# Patient Record
Sex: FEMALE | Race: WHITE | NOT HISPANIC OR LATINO | Employment: OTHER | URBAN - METROPOLITAN AREA
[De-identification: names, ages, dates, MRNs, and addresses within clinical notes are randomized per-mention and may not be internally consistent; named-entity substitution may affect disease eponyms.]

---

## 2017-03-31 ENCOUNTER — OFFICE VISIT (OUTPATIENT)
Dept: URGENT CARE | Facility: CLINIC | Age: 37
End: 2017-03-31
Payer: OTHER GOVERNMENT

## 2017-03-31 VITALS
BODY MASS INDEX: 28.84 KG/M2 | HEIGHT: 67 IN | WEIGHT: 183.75 LBS | SYSTOLIC BLOOD PRESSURE: 136 MMHG | HEART RATE: 90 BPM | DIASTOLIC BLOOD PRESSURE: 80 MMHG | TEMPERATURE: 98 F

## 2017-03-31 DIAGNOSIS — K64.5 THROMBOSED EXTERNAL HEMORRHOID: Primary | ICD-10-CM

## 2017-03-31 PROCEDURE — 99999 PR PBB SHADOW E&M-EST. PATIENT-LVL III: CPT | Mod: PBBFAC,,, | Performed by: PHYSICIAN ASSISTANT

## 2017-03-31 PROCEDURE — 99213 OFFICE O/P EST LOW 20 MIN: CPT | Mod: PBBFAC,PN | Performed by: PHYSICIAN ASSISTANT

## 2017-03-31 PROCEDURE — 99214 OFFICE O/P EST MOD 30 MIN: CPT | Mod: S$PBB,,, | Performed by: PHYSICIAN ASSISTANT

## 2017-03-31 RX ORDER — LIDOCAINE HYDROCHLORIDE AND HYDROCORTISONE ACETATE 30; 5 MG/G; MG/G
1 CREAM TOPICAL 2 TIMES DAILY
Qty: 1 TUBE | Refills: 0 | Status: SHIPPED | OUTPATIENT
Start: 2017-03-31 | End: 2017-04-07

## 2017-03-31 NOTE — PATIENT INSTRUCTIONS
Thrombosed Hemorrhoids    Hemorrhoids are swollen veins in the lower rectum and anus. They're similar to varicose veins that form in the legs. Hemorrhoids can happen inside the rectum (internal hemorrhoids). Or one may form at the anal opening (external hemorrhoids). Although they may bleed, most hemorrhoids aren't cause for concern. But a small blood clot (thrombus) can develop in an external hemorrhoid. This may lead to severe pain and sometimes bleeding.  When to go to the emergency room (ER)  If you have severe pain or excessive bleeding, seek immediate medical care.  What to expect in the ER  A healthcare provider is likely to check your anus and rectum using a slender, lighted tube (anoscope or proctoscope). A local anesthetic is given to ease any discomfort.  Treatment  Treatment recommendations include the following:  · If the blood clot has formed within the past 48 to 72 hours, your healthcare provider may remove it from within the hemorrhoid. This is a simple procedure that can relieve pain. You will have a local anesthetic to keep you pain-free during the procedure. A small incision is made in the skin, and the blood clot is removed. Stitches are generally not needed.  · If more than 72 hours have passed, your healthcare provider will suggest home treatments. Simple home treatments can relieve your pain. These may include warm baths, ointments, suppositories, and witch hazel compresses. Many thrombosed hemorrhoids go away on their own in a few weeks.  · If you have persistent bleeding or painful hemorrhoids, talk to your healthcare provider about possible treatment with banding, ligation, or removal (hemorrhoidectomy).  Tips for preventing hemorrhoids  Tips include the following:  · Eat foods that are high in fiber and use fiber supplements to help prevent constipation.  · Drink plenty of liquids.  · Get regular exercise to help prevent constipation and promote good bowel function.   Date Last  Reviewed: 6/1/2016 © 2000-2016 [employer]. [address] [place] [address], [place] [address]. All rights reserved. This information is not intended as a substitute for professional medical care. Always follow your healthcare professional's instructions.      Sitz baths as needed for pain relief  LidaMantle Cream twice a day for 1 week  Increase fiber and avoid hard stools  If no improvement in pain in 1 week please contact us for referral to Colorectal Surgery.

## 2017-03-31 NOTE — PROGRESS NOTES
"Subjective:       Patient ID: Brionna Carrion is a 36 y.o. female.    Chief Complaint: Rectal Bleeding    Rectal Bleeding   This is a new problem. The current episode started today (happened earlier today when starting to run for her work out). The problem occurs constantly. The problem has been unchanged. Associated symptoms include abdominal pain (feels the pain "everywhere"), congestion and fatigue. Pertinent negatives include no anorexia, change in bowel habit (no recent diarrhea or constipation, tends towards looser/mushy stools at baseline), chills, coughing, fever, nausea, rash, sore throat, urinary symptoms or vomiting. Associated symptoms comments: Has severe pain in the rectal area, feels like a "pulling" sensation describes as a string pulling from the anus. Nothing aggravates the symptoms. She has tried nothing for the symptoms.     Review of Systems   Constitutional: Positive for fatigue. Negative for chills, fever and unexpected weight change (no recent weight loss).   HENT: Positive for congestion. Negative for postnasal drip, rhinorrhea, sinus pressure, sneezing and sore throat.    Respiratory: Negative for cough, shortness of breath and wheezing.    Gastrointestinal: Positive for abdominal pain (feels the pain "everywhere"), anal bleeding, hematochezia and rectal pain. Negative for anorexia, blood in stool (no blood in the stool or melena, she wiped with a toilet tissue and had bright red blood on it, no further bleeding since then), change in bowel habit (no recent diarrhea or constipation, tends towards looser/mushy stools at baseline), constipation, diarrhea, nausea and vomiting.   Skin: Negative for rash.       Objective:      /80 (BP Location: Right arm, Patient Position: Sitting, BP Method: Manual)  Pulse 90  Temp 97.5 °F (36.4 °C) (Tympanic)   Ht 5' 7" (1.702 m)  Wt 83.3 kg (183 lb 12.1 oz)  LMP 03/16/2017 (Within Days)  BMI 28.78 kg/m2  Physical Exam   Constitutional: She is oriented " to person, place, and time. She appears well-developed and well-nourished. No distress.   HENT:   Head: Normocephalic and atraumatic.   Right Ear: External ear normal.   Left Ear: External ear normal.   Nose: Nose normal.   Eyes: Conjunctivae and EOM are normal. Right eye exhibits no discharge. Left eye exhibits no discharge.   Neck: Normal range of motion. Neck supple.   Cardiovascular: Normal rate, regular rhythm, normal heart sounds and intact distal pulses.  Exam reveals no gallop and no friction rub.    No murmur heard.  Pulmonary/Chest: Effort normal and breath sounds normal. No respiratory distress. She has no wheezes. She has no rales.   Abdominal: Soft. Bowel sounds are normal. She exhibits no distension. There is no tenderness. There is no rebound and no guarding.   Genitourinary: Rectal exam shows external hemorrhoid (small thrombosed external hemorrhoid to 4 o'clock, no fissure, hemorrhoid is tender to palpation, no bleeding from rectum, no visible clot in hemorrhoid but purplish hue and edema present) and tenderness. Rectal exam shows no fissure. Pelvic exam was performed with patient supine. There is no rash on the right labia. There is no rash on the left labia.   Lymphadenopathy:        Right: No inguinal adenopathy present.        Left: No inguinal adenopathy present.   Neurological: She is alert and oriented to person, place, and time.   Skin: Skin is warm and dry. No rash noted. She is not diaphoretic. No erythema.   Vitals reviewed.      Assessment:       1. Thrombosed external hemorrhoid        Plan:       Thrombosed external hemorrhoid  -     lidocaine HCl-hydrocortison ac (LIDAMANTLE-HC) 3-0.5 % topical cream; Apply 1 application topically 2 (two) times daily.  Dispense: 1 Tube; Refill: 0    Patient mentions she had hemorrhoids following vaginal delivery of child but has never had a bleeding or painful episode. Plan for conservative measures and if no improvement will refer to Surgery.    Chanda  baths as needed for pain relief  LidaMantle Cream twice a day for 1 week  Increase fiber and avoid hard stools  If no improvement in pain in 1 week please contact us for referral to Colorectal Surgery.    Heather Trant PA-C [facility]

## 2017-03-31 NOTE — MR AVS SNAPSHOT
[facility]  [address]  [place] [address]  Phone: [phone]                  Brionna Carrion   3/31/2017 8:50 AM   Office Visit    Description:  Female : 1980   Provider:  Jacki Hernadez PA-C   Department:  [facility]           Reason for Visit     Rectal Bleeding           Diagnoses this Visit        Comments    Thrombosed external hemorrhoid    -  Primary            To Do List           Goals (5 Years of Data)     None      Follow-Up and Disposition     Return if symptoms worsen or fail to improve.       These Medications        Disp Refills Start End    lidocaine HCl-hydrocortison ac (LIDAMANTLE-HC) 3-0.5 % topical cream 1 Tube 0 3/31/2017 2017    Apply 1 application topically 2 (two) times daily. - Topical (Top)    Pharmacy: [facility] [address] [place] [address] [place] [address] [address]  AT [facility] Ph #: [phone]         [facility]     [facility] Nurse Care Line -  Assistance  Unless otherwise directed by your provider, please contact [facility], our nurse care line that is available for  assistance.     Registered nurses in the [facility] Center provide: appointment scheduling, clinical advisement, health education, and other advisory services.  Call: [phone] (toll free)               Medications           Message regarding Medications     Verify the changes and/or additions to your medication regime listed below are the same as discussed with your clinician today.  If any of these changes or additions are incorrect, please notify your healthcare provider.        START taking these NEW medications        Refills    lidocaine HCl-hydrocortison ac (LIDAMANTLE-HC) 3-0.5 % topical cream 0    Sig: Apply 1 application topically 2 (two) times daily.    Class: Normal    Route: Topical (Top)           Verify that the below list of medications is an accurate representation of the medications you are currently  "taking.  If none reported, the list may be blank. If incorrect, please contact your healthcare provider. Carry this list with you in case of emergency.           Current Medications     cyclobenzaprine (FLEXERIL) 10 MG tablet Take 10 mg by mouth 3 (three) times daily as needed for Muscle spasms.    hydrocodone-acetaminophen 7.5-325mg (NORCO) 7.5-325 mg per tablet Take 1 tablet by mouth every 8 (eight) hours as needed for Pain.    lidocaine HCl-hydrocortison ac (LIDAMANTLE-HC) 3-0.5 % topical cream Apply 1 application topically 2 (two) times daily.    naproxen (NAPROSYN) 500 MG tablet Take 500 mg by mouth 2 (two) times daily.           Clinical Reference Information           Your Vitals Were     BP Pulse Temp Height    136/80 (BP Location: Right arm, Patient Position: Sitting, BP Method: Manual) 90 97.5 °F (36.4 °C) (Tympanic) 5' 7" (1.702 m)    Weight Last Period BMI    83.3 kg (183 lb 12.1 oz) 03/16/2017 (Within Days) 28.78 kg/m2      Blood Pressure          Most Recent Value    BP  136/80      Allergies as of 3/31/2017     No Known Allergies      Immunizations Administered on Date of Encounter - 3/31/2017     None      Instructions      Thrombosed Hemorrhoids    Hemorrhoids are swollen veins in the lower rectum and anus. They're similar to varicose veins that form in the legs. Hemorrhoids can happen inside the rectum (internal hemorrhoids). Or one may form at the anal opening (external hemorrhoids). Although they may bleed, most hemorrhoids aren't cause for concern. But a small blood clot (thrombus) can develop in an external hemorrhoid. This may lead to severe pain and sometimes bleeding.  When to go to the emergency room (ER)  If you have severe pain or excessive bleeding, seek immediate medical care.  What to expect in the ER  A healthcare provider is likely to check your anus and rectum using a slender, lighted tube (anoscope or proctoscope). A local anesthetic is given to ease any " discomfort.  Treatment  Treatment recommendations include the following:  · If the blood clot has formed within the past 48 to 72 hours, your healthcare provider may remove it from within the hemorrhoid. This is a simple procedure that can relieve pain. You will have a local anesthetic to keep you pain-free during the procedure. A small incision is made in the skin, and the blood clot is removed. Stitches are generally not needed.  · If more than 72 hours have passed, your healthcare provider will suggest home treatments. Simple home treatments can relieve your pain. These may include warm baths, ointments, suppositories, and witch hazel compresses. Many thrombosed hemorrhoids go away on their own in a few weeks.  · If you have persistent bleeding or painful hemorrhoids, talk to your healthcare provider about possible treatment with banding, ligation, or removal (hemorrhoidectomy).  Tips for preventing hemorrhoids  Tips include the following:  · Eat foods that are high in fiber and use fiber supplements to help prevent constipation.  · Drink plenty of liquids.  · Get regular exercise to help prevent constipation and promote good bowel function.   Date Last Reviewed: 6/1/2016  © 1657-9091 [employer]. [address] [place] [address]. All rights reserved. This information is not intended as a substitute for professional medical care. Always follow your healthcare professional's instructions.      Sitz baths as needed for pain relief  LidaMantle Cream twice a day for 1 week  Increase fiber and avoid hard stools  If no improvement in pain in 1 week please contact us for referral to Colorectal Surgery.       Language Assistance Services     ATTENTION: Language assistance services are available, free of charge. Please call [phone].      ATENCIÓN: Si habla glenañol, tiene a rodas disposición servicios gratuitos de asistencia lingüística. Llame al [phone].     GABRIELLA Ý: N?u b?n nói Ti?ng Vi?t,  có các d?ch v? h? tr? ngôn ng? mi?n phí dành cho b?n. G?i s? [phone].         [facility] complies with applicable Federal civil rights laws and does not discriminate on the basis of race, color, national origin, age, disability, or sex.

## 2017-07-03 ENCOUNTER — TELEPHONE (OUTPATIENT)
Dept: OBSTETRICS AND GYNECOLOGY | Facility: CLINIC | Age: 37
End: 2017-07-03

## 2017-08-01 ENCOUNTER — LAB VISIT (OUTPATIENT)
Dept: LAB | Facility: HOSPITAL | Age: 37
End: 2017-08-01
Attending: INTERNAL MEDICINE
Payer: OTHER GOVERNMENT

## 2017-08-01 ENCOUNTER — OFFICE VISIT (OUTPATIENT)
Dept: INTERNAL MEDICINE | Facility: CLINIC | Age: 37
End: 2017-08-01
Payer: OTHER GOVERNMENT

## 2017-08-01 ENCOUNTER — APPOINTMENT (OUTPATIENT)
Dept: RADIOLOGY | Facility: HOSPITAL | Age: 37
End: 2017-08-01
Attending: INTERNAL MEDICINE
Payer: OTHER GOVERNMENT

## 2017-08-01 VITALS
RESPIRATION RATE: 16 BRPM | DIASTOLIC BLOOD PRESSURE: 57 MMHG | HEART RATE: 79 BPM | SYSTOLIC BLOOD PRESSURE: 124 MMHG | BODY MASS INDEX: 28.86 KG/M2 | WEIGHT: 183.88 LBS | HEIGHT: 67 IN | OXYGEN SATURATION: 98 % | TEMPERATURE: 99 F

## 2017-08-01 DIAGNOSIS — E55.9 VITAMIN D DEFICIENCY: ICD-10-CM

## 2017-08-01 DIAGNOSIS — M79.675 TOE PAIN, LEFT: ICD-10-CM

## 2017-08-01 DIAGNOSIS — N92.1 MENOMETRORRHAGIA: ICD-10-CM

## 2017-08-01 DIAGNOSIS — M79.675 TOE PAIN, LEFT: Primary | ICD-10-CM

## 2017-08-01 DIAGNOSIS — R53.83 FATIGUE, UNSPECIFIED TYPE: ICD-10-CM

## 2017-08-01 DIAGNOSIS — S92.512A CLOSED DISPLACED FRACTURE OF PROXIMAL PHALANX OF LESSER TOE OF LEFT FOOT, INITIAL ENCOUNTER: Primary | ICD-10-CM

## 2017-08-01 LAB
25(OH)D3+25(OH)D2 SERPL-MCNC: 47 NG/ML
ALBUMIN SERPL BCP-MCNC: 4.1 G/DL
ALP SERPL-CCNC: 60 U/L
ALT SERPL W/O P-5'-P-CCNC: 20 U/L
ANION GAP SERPL CALC-SCNC: 9 MMOL/L
AST SERPL-CCNC: 24 U/L
BASOPHILS # BLD AUTO: 0.04 K/UL
BASOPHILS NFR BLD: 0.8 %
BILIRUB SERPL-MCNC: 0.4 MG/DL
BUN SERPL-MCNC: 10 MG/DL
CALCIUM SERPL-MCNC: 10.1 MG/DL
CHLORIDE SERPL-SCNC: 105 MMOL/L
CHOLEST/HDLC SERPL: 2.8 {RATIO}
CO2 SERPL-SCNC: 25 MMOL/L
CREAT SERPL-MCNC: 0.8 MG/DL
DIFFERENTIAL METHOD: ABNORMAL
EOSINOPHIL # BLD AUTO: 0.1 K/UL
EOSINOPHIL NFR BLD: 1.3 %
ERYTHROCYTE [DISTWIDTH] IN BLOOD BY AUTOMATED COUNT: 13.4 %
EST. GFR  (AFRICAN AMERICAN): >60 ML/MIN/1.73 M^2
EST. GFR  (NON AFRICAN AMERICAN): >60 ML/MIN/1.73 M^2
FERRITIN SERPL-MCNC: 69 NG/ML
GLUCOSE SERPL-MCNC: 85 MG/DL
HCT VFR BLD AUTO: 37.1 %
HDL/CHOLESTEROL RATIO: 35.6 %
HDLC SERPL-MCNC: 188 MG/DL
HDLC SERPL-MCNC: 67 MG/DL
HGB BLD-MCNC: 12.6 G/DL
IRON SERPL-MCNC: 84 UG/DL
LDLC SERPL CALC-MCNC: 110.8 MG/DL
LYMPHOCYTES # BLD AUTO: 2.1 K/UL
LYMPHOCYTES NFR BLD: 39.5 %
MCH RBC QN AUTO: 31.7 PG
MCHC RBC AUTO-ENTMCNC: 34 G/DL
MCV RBC AUTO: 93 FL
MONOCYTES # BLD AUTO: 0.4 K/UL
MONOCYTES NFR BLD: 7.1 %
NEUTROPHILS # BLD AUTO: 2.7 K/UL
NEUTROPHILS NFR BLD: 51.3 %
NONHDLC SERPL-MCNC: 121 MG/DL
PLATELET # BLD AUTO: 255 K/UL
PMV BLD AUTO: 10.8 FL
POTASSIUM SERPL-SCNC: 5 MMOL/L
PROT SERPL-MCNC: 7.8 G/DL
RBC # BLD AUTO: 3.98 M/UL
SATURATED IRON: 22 %
SODIUM SERPL-SCNC: 139 MMOL/L
T4 FREE SERPL-MCNC: 1.16 NG/DL
TOTAL IRON BINDING CAPACITY: 379 UG/DL
TRANSFERRIN SERPL-MCNC: 256 MG/DL
TRIGL SERPL-MCNC: 51 MG/DL
TSH SERPL DL<=0.005 MIU/L-ACNC: 2.26 UIU/ML
WBC # BLD AUTO: 5.24 K/UL

## 2017-08-01 PROCEDURE — 85025 COMPLETE CBC W/AUTO DIFF WBC: CPT

## 2017-08-01 PROCEDURE — 99214 OFFICE O/P EST MOD 30 MIN: CPT | Mod: S$PBB,,, | Performed by: INTERNAL MEDICINE

## 2017-08-01 PROCEDURE — 82728 ASSAY OF FERRITIN: CPT

## 2017-08-01 PROCEDURE — 80053 COMPREHEN METABOLIC PANEL: CPT

## 2017-08-01 PROCEDURE — 82306 VITAMIN D 25 HYDROXY: CPT

## 2017-08-01 PROCEDURE — 99999 PR PBB SHADOW E&M-EST. PATIENT-LVL IV: CPT | Mod: PBBFAC,,, | Performed by: INTERNAL MEDICINE

## 2017-08-01 PROCEDURE — 99214 OFFICE O/P EST MOD 30 MIN: CPT | Mod: PBBFAC,PN,25 | Performed by: INTERNAL MEDICINE

## 2017-08-01 PROCEDURE — 84443 ASSAY THYROID STIM HORMONE: CPT

## 2017-08-01 PROCEDURE — 73630 X-RAY EXAM OF FOOT: CPT | Mod: 26,LT,, | Performed by: RADIOLOGY

## 2017-08-01 PROCEDURE — 73630 X-RAY EXAM OF FOOT: CPT | Mod: TC,PO,LT

## 2017-08-01 PROCEDURE — 80061 LIPID PANEL: CPT

## 2017-08-01 PROCEDURE — 84439 ASSAY OF FREE THYROXINE: CPT

## 2017-08-01 PROCEDURE — 83540 ASSAY OF IRON: CPT

## 2017-08-01 NOTE — PROGRESS NOTES
Subjective:      Patient ID: Brionna Carrion is a 36 y.o. female.    Chief Complaint: Toe Injury      HPI  Ms. Carrion is a patient of mine, who presents for left pinky toe pain, which started 1.5 weeks ago while wrestling with her son. She has noted pain with running or pressure in the area and has not taken anything for her pain. No pain at night in bed, but pain with shoes.    She also reports having irregular menses over the past 3 months, particularly this past month, which she describes as 3 heavy menses, which caused her usual degree of cramping, irregularly duration, which she took Midol (acetaminophen/caffeine/pyrilamine). She reports fatigue over the past month, which she suspects may be due to anemia.    Past Medical History:   Diagnosis Date    Abnormal Pap smear     HPV/ biopsy-- never had a normal pap    Allergy     seasonal    History of HPV infection 01/01/2002    Initially had abnormal PAPs, except for 2015.     Past Surgical History:   Procedure Laterality Date    APPENDECTOMY      BREAST RECONSTRUCTION      augmentation    TONSILLECTOMY       Social History     Social History    Marital status:      Spouse name: N/A    Number of children: N/A    Years of education: N/A     Occupational History    [employer]     Social History Main Topics    Smoking status: Never Smoker    Smokeless tobacco: Not on file    Alcohol use Yes      Comment: 2 drinks every 2 weeks    Drug use: No    Sexual activity: Yes     Partners: Male      Comment:  visectomy     Other Topics Concern    Not on file     Social History Narrative    Breakfast: Skips, except water or Diet Coke    Lunch: [employer]     Family History   Problem Relation Age of Onset    Hypertension Mother     Breast cancer Maternal Grandmother     Hypertension Maternal Grandmother     Glaucoma Maternal Grandmother     Cataracts Maternal Grandmother     Breast cancer Maternal Aunt     Breast cancer Maternal  "Aunt     Breast cancer Maternal Aunt     Breast cancer Maternal Aunt     Ovarian cancer Neg Hx     Stroke Neg Hx        Current Outpatient Prescriptions:     cyclobenzaprine (FLEXERIL) 10 MG tablet, Take 10 mg by mouth 3 (three) times daily as needed for Muscle spasms., Disp: , Rfl:     hydrocodone-acetaminophen 7.5-325mg (NORCO) 7.5-325 mg per tablet, Take 1 tablet by mouth every 8 (eight) hours as needed for Pain., Disp: 45 tablet, Rfl: 0    naproxen (NAPROSYN) 500 MG tablet, Take 500 mg by mouth 2 (two) times daily., Disp: , Rfl:     Review of patient's allergies indicates:  No Known Allergies    Review of Systems   Negative.     Objective:     BP (!) 124/57 (BP Location: Left arm, Patient Position: Sitting, BP Method: Manual)   Pulse 79   Temp 98.7 °F (37.1 °C) (Tympanic)   Resp 16   Ht 5' 7" (1.702 m)   Wt 83.4 kg (183 lb 13.8 oz)   SpO2 98%   BMI 28.80 kg/m²     Physical Exam  GEN: A&O fully, NAD  PSYC: Normal affect    Assessment:      1. Toe pain, left: Likely fx. Will check x-ray today.    2. Menometrorrhagia: DDx includes fibroids, endometriosis. Will refer to Dr. Corinna Shepard.   3.        Plan:   Toe pain, left  -     X-Ray Foot Complete Left; Future; Expected date: 08/01/2017    Menometrorrhagia  -     Ambulatory consult to Obstetrics / Gynecology  -     CBC auto differential; Future; Expected date: 08/01/2017    Fatigue, unspecified type  -     CBC auto differential; Future; Expected date: 08/01/2017  -     Comprehensive metabolic panel; Future; Expected date: 08/01/2017  -     T4, free; Future; Expected date: 08/01/2017  -     Lipid panel; Future; Expected date: 08/01/2017  -     TSH; Future; Expected date: 08/01/2017    Vitamin D deficiency  -     Vitamin D; Future; Expected date: 08/01/2017        RTC in 4 weeks RE fatigue or sooner as needed  "

## 2017-08-02 DIAGNOSIS — E55.9 VITAMIN D DEFICIENCY: Primary | ICD-10-CM

## 2017-08-04 ENCOUNTER — PATIENT MESSAGE (OUTPATIENT)
Dept: INTERNAL MEDICINE | Facility: CLINIC | Age: 37
End: 2017-08-04

## 2017-08-07 DIAGNOSIS — S92.512A CLOSED DISPLACED FRACTURE OF PROXIMAL PHALANX OF LESSER TOE OF LEFT FOOT, INITIAL ENCOUNTER: Primary | ICD-10-CM

## 2017-08-07 RX ORDER — MELOXICAM 7.5 MG/1
7.5 TABLET ORAL DAILY
Qty: 14 TABLET | Refills: 0 | Status: SHIPPED | OUTPATIENT
Start: 2017-08-07 | End: 2017-08-21

## 2017-08-10 ENCOUNTER — OFFICE VISIT (OUTPATIENT)
Dept: OBSTETRICS AND GYNECOLOGY | Facility: CLINIC | Age: 37
End: 2017-08-10
Payer: OTHER GOVERNMENT

## 2017-08-10 VITALS
WEIGHT: 179.44 LBS | DIASTOLIC BLOOD PRESSURE: 64 MMHG | SYSTOLIC BLOOD PRESSURE: 105 MMHG | HEIGHT: 67 IN | BODY MASS INDEX: 28.16 KG/M2

## 2017-08-10 DIAGNOSIS — B97.7 HPV IN FEMALE: Primary | ICD-10-CM

## 2017-08-10 PROCEDURE — 99395 PREV VISIT EST AGE 18-39: CPT | Mod: S$PBB,,, | Performed by: OBSTETRICS & GYNECOLOGY

## 2017-08-10 PROCEDURE — 99213 OFFICE O/P EST LOW 20 MIN: CPT | Mod: PBBFAC,PN | Performed by: OBSTETRICS & GYNECOLOGY

## 2017-08-10 PROCEDURE — 88142 CYTOPATH C/V THIN LAYER: CPT

## 2017-08-10 PROCEDURE — 99999 PR PBB SHADOW E&M-EST. PATIENT-LVL III: CPT | Mod: PBBFAC,,, | Performed by: OBSTETRICS & GYNECOLOGY

## 2017-08-10 NOTE — PROGRESS NOTES
"Subjective:       Brionna Carrion is a 36 y.o. female here for a annual exam.  Current complaints: Last 3 months periods have been irregular and heavier. Did have LMP on 2017 but had some intermenstrual bleeding w/o pain.  Personal health questionnaire reviewed: yes.    Patient states has had abnormal paps due to "HPV"    Mutually monogamous relationship. No pelvic pain, vaginal discharge or postcoital spotting.   has vasectomy.  No contraception used any longer (Had Mirena but removed a few years back as no need for contraception).   Patient had CBC recently due to this and was normal.  Denies weakness or snycope.      Gynecologic History  Patient's last menstrual period was 2017.  Contraception: vasectomy  Last Pap: . Results were: normal  Last mammogram: NA. Results were: NA    Obstetric History  OB History    Para Term  AB Living   3 2 2   1 2   SAB TAB Ectopic Multiple Live Births     1     2      # Outcome Date GA Lbr Greg/2nd Weight Sex Delivery Anes PTL Lv   3 TAB            2 Term      Vag-Spont   [place]   1 Term      Vag-Spont   [place]            The following portions of the patient's history were reviewed and updated as appropriate: allergies, current medications, past family history, past medical history, past social history, past surgical history and problem list.    Review of Systems  Pertinent items are noted in HPI.      Objective:        /64   Ht 5' 7" (1.702 m)   Wt 81.4 kg (179 lb 7.3 oz)   LMP 2017   BMI 28.11 kg/m²     General Appearance:    Alert, cooperative, no distress, appears stated age   Head:    Normocephalic, without obvious abnormality, atraumatic   Eyes:    PERRL, conjunctiva/corneas clear, EOM's intact,  both eyes   Ears:    Normal external ear canals, both ears   Nose:   Nares normal, septum midline, mucosa normal, no drainage    or sinus tenderness   Throat:   Lips, mucosa, and tongue normal; teeth and gums normal   Neck:   Supple, " symmetrical, trachea midline, no adenopathy;     thyroid:  no enlargement/tenderness/nodules; no carotid    bruit or JVD   Back:     Symmetric, no curvature, ROM normal, no CVA tenderness   Lungs:     Clear to auscultation bilaterally, respirations unlabored   Chest Wall:    No tenderness or deformity    Heart:    Regular rate and rhythm, S1 and S2 normal, no murmur, rub   or gallop   Breast Exam:    No tenderness, masses, or nipple abnormality   Abdomen:     Soft, non-tender, bowel sounds active all four quadrants,     no masses, no organomegaly   Genitalia:    Normal female without lesion, discharge or tenderness. Cervix midline, parous and w/o friability and neg for CMT. Uterus firm, mobile, midline, non-tender. No adnexal masses or tenderness noted with exam. Pap done.    Rectal:    Deferred.    Extremities:   Extremities normal, atraumatic, no cyanosis or edema   Pulses:   2+ and symmetric all extremities   Skin:   Skin color, texture, turgor normal, no rashes or lesions   Lymph nodes:   Cervical, supraclavicular, and axillary nodes normal   Neurologic:   normal strength, sensation and reflexes     throughout           Assessment:      Healthy female exam.    H/o intermenstrual bleeding.   Pap due to h/o HPV      Plan:       1. Reviewed current [place] guidelines for Q 3 year cervical cancer screening. Patient will be due next year with annual visit.  2. Watchful waiting for menstrual cycle as most likely will self correct without hormonal intervention when patient presented with options to control/regular cycles with a lo dose estrogen pill.  Understands that menstrual cycles can become unregular and in the absence of infection/disease or pathology is non-emergent and self-limiting often times.  3. Continue annual exam.  To come in sooner if problems occur.

## 2017-08-10 NOTE — LETTER
August 10, 2017      Johnny Mantilla MD  [address]  [address] [facility] [address]           [facility]  [address] [place] [address]  Phone: [phone]          Patient: Brionna Carrion   MR Number: [id number]   YOB: 1980   Date of Visit: 8/10/2017       Dear Dr. Johnny Mantilla:    Thank you for referring Brionna Carrion to me for evaluation. Attached you will find relevant portions of my assessment and plan of care.    If you have questions, please do not hesitate to call me. I look forward to following Brionna Carrion along with you.    Sincerely,    Roselyn Golden CNM    Enclosure  CC:  No Recipients    If you would like to receive this communication electronically, please contact [email or web address] or [phone] to request more information on [employer] Link access.    For providers and/or their staff who would like to refer a patient to [facility], please contact us through our one-stop-shop provider referral line, [facility], at [phone].    If you feel you have received this communication in error or would no longer like to receive these types of communications, please e-mail [email or web address]

## 2017-08-16 ENCOUNTER — OFFICE VISIT (OUTPATIENT)
Dept: INTERNAL MEDICINE | Facility: CLINIC | Age: 37
End: 2017-08-16
Payer: OTHER GOVERNMENT

## 2017-08-16 VITALS
WEIGHT: 181.19 LBS | DIASTOLIC BLOOD PRESSURE: 63 MMHG | OXYGEN SATURATION: 98 % | HEIGHT: 67 IN | BODY MASS INDEX: 28.44 KG/M2 | SYSTOLIC BLOOD PRESSURE: 118 MMHG | TEMPERATURE: 98 F | HEART RATE: 93 BPM

## 2017-08-16 DIAGNOSIS — M62.830 BACK MUSCLE SPASM: Primary | ICD-10-CM

## 2017-08-16 PROCEDURE — 99213 OFFICE O/P EST LOW 20 MIN: CPT | Mod: S$PBB,,, | Performed by: INTERNAL MEDICINE

## 2017-08-16 PROCEDURE — 3008F BODY MASS INDEX DOCD: CPT | Mod: ,,, | Performed by: INTERNAL MEDICINE

## 2017-08-16 PROCEDURE — 99213 OFFICE O/P EST LOW 20 MIN: CPT | Mod: PBBFAC,PN | Performed by: INTERNAL MEDICINE

## 2017-08-16 PROCEDURE — 99999 PR PBB SHADOW E&M-EST. PATIENT-LVL III: CPT | Mod: PBBFAC,,, | Performed by: INTERNAL MEDICINE

## 2017-08-16 RX ORDER — HYDROCODONE BITARTRATE AND ACETAMINOPHEN 5; 325 MG/1; MG/1
1 TABLET ORAL NIGHTLY PRN
Qty: 5 TABLET | Refills: 0 | Status: SHIPPED | OUTPATIENT
Start: 2017-08-16 | End: 2018-05-23

## 2017-08-16 RX ORDER — TIZANIDINE HYDROCHLORIDE 6 MG/1
6 CAPSULE, GELATIN COATED ORAL NIGHTLY PRN
Qty: 5 CAPSULE | Refills: 0 | Status: SHIPPED | OUTPATIENT
Start: 2017-08-16 | End: 2017-08-21

## 2017-08-16 NOTE — PROGRESS NOTES
Subjective:      Patient ID: Brionna Carrion is a 36 y.o. female.    Chief Complaint: Back Pain      HPI  Ms. Carrion is a patient of mine, who presents for back pain, which started yesterday afternoon following exercise, which worsened last night. She took Flexeril from a previous prescription (>=1 yr old), which didn't help. She has had back spasms since, which have been debilitating. No loss of bladder or bowel function. No numbness or tingling.     Past Medical History:   Diagnosis Date    Abnormal Pap smear     HPV/ biopsy-- never had a normal pap    Allergy     seasonal    History of HPV infection 01/01/2002    Initially had abnormal PAPs, except for 2015.     Past Surgical History:   Procedure Laterality Date    APPENDECTOMY      BREAST RECONSTRUCTION      augmentation    TONSILLECTOMY       Social History     Social History    Marital status:      Spouse name: N/A    Number of children: N/A    Years of education: N/A     Occupational History    [employer]     Social History Main Topics    Smoking status: Never Smoker    Smokeless tobacco: Not on file    Alcohol use Yes      Comment: 2 drinks every 2 weeks    Drug use: No    Sexual activity: Yes     Partners: Male      Comment:  visectomy     Other Topics Concern    Not on file     Social History Narrative    Breakfast: Skips, except water or Diet Coke    Lunch: [employer]     Family History   Problem Relation Age of Onset    Hypertension Mother     Breast cancer Maternal Grandmother     Hypertension Maternal Grandmother     Glaucoma Maternal Grandmother     Cataracts Maternal Grandmother     Breast cancer Maternal Aunt     Breast cancer Maternal Aunt     Breast cancer Maternal Aunt     Breast cancer Maternal Aunt     Ovarian cancer Neg Hx     Stroke Neg Hx        Current Outpatient Prescriptions:     meloxicam (MOBIC) 7.5 MG tablet, Take 1 tablet (7.5 mg total) by mouth once daily., Disp: 14 tablet,  "Rfl: 0    Review of patient's allergies indicates:  No Known Allergies    Review of Systems   Negative.     Objective:     /63 (BP Location: Left arm, Patient Position: Sitting, BP Method: Large (Automatic))   Pulse 93   Temp 97.6 °F (36.4 °C) (Tympanic)   Ht 5' 7" (1.702 m)   Wt 82.2 kg (181 lb 3.5 oz)   LMP 07/24/2017   SpO2 98%   BMI 28.38 kg/m²     Physical Exam  GEN: A&O fully, NAD  PSYC: Normal affect  MSK: full ROM; paraspinal spasms    Lab Results   Component Value Date    WBC 5.24 08/01/2017    HGB 12.6 08/01/2017    HCT 37.1 08/01/2017     08/01/2017    CHOL 188 08/01/2017    TRIG 51 08/01/2017    HDL 67 08/01/2017    ALT 20 08/01/2017    AST 24 08/01/2017     08/01/2017    K 5.0 08/01/2017     08/01/2017    CREATININE 0.8 08/01/2017    BUN 10 08/01/2017    CO2 25 08/01/2017    TSH 2.263 08/01/2017       Assessment:      No diagnosis found.  Plan:   There are no diagnoses linked to this encounter.    RTC in xxx RE xxx or sooner as needed  "

## 2018-04-07 ENCOUNTER — PATIENT MESSAGE (OUTPATIENT)
Dept: INTERNAL MEDICINE | Facility: CLINIC | Age: 38
End: 2018-04-07

## 2018-04-10 DIAGNOSIS — T78.40XD ALLERGIC STATE, SUBSEQUENT ENCOUNTER: ICD-10-CM

## 2018-04-10 RX ORDER — LORATADINE 10 MG/1
10 TABLET ORAL DAILY
Refills: 0 | COMMUNITY
Start: 2018-04-10 | End: 2018-05-23

## 2018-05-23 ENCOUNTER — OFFICE VISIT (OUTPATIENT)
Dept: INTERNAL MEDICINE | Facility: CLINIC | Age: 38
End: 2018-05-23
Payer: OTHER GOVERNMENT

## 2018-05-23 VITALS
BODY MASS INDEX: 28.31 KG/M2 | OXYGEN SATURATION: 99 % | DIASTOLIC BLOOD PRESSURE: 68 MMHG | WEIGHT: 180.75 LBS | HEART RATE: 89 BPM | TEMPERATURE: 98 F | SYSTOLIC BLOOD PRESSURE: 116 MMHG

## 2018-05-23 DIAGNOSIS — Z80.3 FAMILY HISTORY OF BREAST CANCER IN FEMALE: ICD-10-CM

## 2018-05-23 DIAGNOSIS — Z00.00 ROUTINE GENERAL MEDICAL EXAMINATION AT A HEALTH CARE FACILITY: Primary | ICD-10-CM

## 2018-05-23 DIAGNOSIS — R68.89 RECENT CHANGE IN WEIGHT: ICD-10-CM

## 2018-05-23 PROCEDURE — 99214 OFFICE O/P EST MOD 30 MIN: CPT | Mod: S$PBB,,, | Performed by: INTERNAL MEDICINE

## 2018-05-23 PROCEDURE — 99213 OFFICE O/P EST LOW 20 MIN: CPT | Mod: PBBFAC,PN | Performed by: INTERNAL MEDICINE

## 2018-05-23 PROCEDURE — 99999 PR PBB SHADOW E&M-EST. PATIENT-LVL III: CPT | Mod: PBBFAC,,, | Performed by: INTERNAL MEDICINE

## 2018-05-23 NOTE — PROGRESS NOTES
Subjective:      Patient ID: Brionna Carrion is a 37 y.o. female.    Chief Complaint: Weight Gain      HPI     Ms. Brionna Carrion is a patient of Johnny Mantilla MD, who presents for activity change and unexpected weight change.    She had reported wanting to decrease her weight by 30-40 lbs in 10/2016, at which time her weight was 179.5 lbs. Today her weight is 180.78 lbs. She today reports not losing any weight over the past 2 weeks, at which time she started an aggressive exercise program and 1 week ago started an appetite suppresser called Performix 2 caps daily, which contains caffeine 300 mg, theacrine 50 mg, p-Synephrine 25 mg, Evodiamine 5 mg, Yohimbine 5 mg, black pepper extract, oils (medium chain TG, safflower extract, CLA, D-limonene, azeem, gingerols, zingerones, shogaols, rosemary, camosic acid, camosol, ursolic acid and oleanolic acid) 850 mg, B12 1000 mcg and 1 gm total fat, 10 juan.    Of note, EPIC indicates she is due for TDAP.       Past Medical History:   Diagnosis Date    Abnormal Pap smear     HPV/ biopsy-- never had a normal pap    Allergy     year around; tried OTC Zyrtec, Allegra, which makes her sleepy; increased water has helped partially    Family history of breast cancer in female     Early breast cancer on mother's side of the family    History of HPV infection 01/01/2002    Initially had abnormal PAPs, except for 2015.     Past Surgical History:   Procedure Laterality Date    APPENDECTOMY      BREAST RECONSTRUCTION      augmentation    TONSILLECTOMY       Social History     Social History    Marital status:      Spouse name: N/A    Number of children: N/A    Years of education: N/A     Occupational History    [employer]        [employer]     Social History Main Topics    Smoking status: Never Smoker    Smokeless tobacco: Not on file    Alcohol use Yes      Comment: 2 drinks every 2 weeks    Drug use: No    Sexual activity: Yes     Partners: Male      Comment:   "visectomy     Other Topics Concern    Not on file     Social History Narrative    Breakfast: Protein shake (grass fed & powder CLA), water, +5 hour energy drink throughout the day    Lunch: Meat: shrimp & 1/2 sweet potato; water     Dinner: Salad with black olives, olive oil, little salt; water    Snacks: None (no sodas or diet sodas)    Eats out: 1x/mo: [employer]: grilled shrimp, sweet potato, part of a salad; water    PA: 45 min resistance 3x/wk, runs 5 mi 3-4 x/wk, yoga in the sauna "[employer]"    Water: 2 - 24 oz/d + 8 = 56 oz/d         Family History   Problem Relation Age of Onset    Hypertension Mother     Breast cancer Maternal Grandmother     Hypertension Maternal Grandmother     Glaucoma Maternal Grandmother     Cataracts Maternal Grandmother     Breast cancer Maternal Aunt     Breast cancer Maternal Aunt     Breast cancer Maternal Aunt     Breast cancer Maternal Aunt     Ovarian cancer Neg Hx     Stroke Neg Hx      No current outpatient prescriptions on file.    Review of patient's allergies indicates:  No Known Allergies     Review of Systems   Constitutional: Positive for activity change and unexpected weight change.   HENT: Negative for hearing loss, rhinorrhea and trouble swallowing.    Eyes: Negative for discharge and visual disturbance.   Respiratory: Negative for chest tightness and wheezing.    Cardiovascular: Negative for chest pain and palpitations.   Gastrointestinal: Negative for blood in stool, constipation, diarrhea and vomiting.   Endocrine: Negative for polydipsia and polyuria.   Genitourinary: Negative for difficulty urinating, dysuria, hematuria and menstrual problem.   Musculoskeletal: Negative for arthralgias, joint swelling and neck pain.   Neurological: Negative for weakness and headaches.   Psychiatric/Behavioral: Negative for confusion and dysphoric mood.       Objective:     /68 (BP Location: Right arm, Patient Position: Sitting, BP Method: Large (Manual))   " Pulse 89   Temp 97.8 °F (36.6 °C) (Tympanic)   Wt 82 kg (180 lb 12.4 oz)   SpO2 99%   BMI 28.31 kg/m²     Physical Exam  GEN: A&O fully, NAD  PSYC: Normal affect      Lab Results   Component Value Date    WBC 5.24 08/01/2017    HGB 12.6 08/01/2017    HCT 37.1 08/01/2017     08/01/2017    CHOL 188 08/01/2017    TRIG 51 08/01/2017    HDL 67 08/01/2017    LDLCALC 110.8 08/01/2017    ALT 20 08/01/2017    AST 24 08/01/2017     08/01/2017    K 5.0 08/01/2017     08/01/2017    CREATININE 0.8 08/01/2017    BUN 10 08/01/2017    CO2 25 08/01/2017    TSH 2.263 08/01/2017       Assessment:      1. Recent change in weight: Would like to lose weight, but hasn't been successful. Discussed strategies for lifestyle modifications, including systematically increasing physical activity, water; and avoiding potatoes, sugar sweetened beverages, red/processed meats/chicken, grains, tomatoes, colorful peppers; and increasing yogurt, whole fruits, unsalted nuts, non-starchy vegetables, cooked beans, and weight logging. Recommended she avoid supplements except vitamin D 1,000 IU/day, omega 3 fatty acids 1-2 gm/day if not having salmon/tuna and turmeric 500 mg 2 capsules/day.   2. Family history of breast cancer in female: Encouraged her to have breast exams q6m with her OBGYN or annually staggered between OB & PCP.        Plan:   Routine general medical examination at a health care facility  -     CBC auto differential; Future; Expected date: 05/23/2018  -     Comprehensive metabolic panel; Future; Expected date: 05/23/2018  -     Lipid panel; Future; Expected date: 05/23/2018  -     T4, free; Future; Expected date: 05/23/2018  -     TSH; Future; Expected date: 05/23/2018  -     Vitamin D; Future; Expected date: 05/23/2018    Family history of breast cancer in female    Recent change in weight        Follow-up in about 2 weeks (around 6/6/2018), or if symptoms worsen or fail to improve, for FU on overweight.

## 2018-07-12 ENCOUNTER — OFFICE VISIT (OUTPATIENT)
Dept: URGENT CARE | Facility: CLINIC | Age: 38
End: 2018-07-12
Payer: OTHER GOVERNMENT

## 2018-07-12 ENCOUNTER — HOSPITAL ENCOUNTER (OUTPATIENT)
Dept: RADIOLOGY | Facility: HOSPITAL | Age: 38
Discharge: HOME | End: 2018-07-12
Attending: FAMILY MEDICINE
Payer: OTHER GOVERNMENT

## 2018-07-12 ENCOUNTER — OFFICE VISIT (OUTPATIENT)
Dept: ORTHOPEDICS | Facility: CLINIC | Age: 38
End: 2018-07-12
Payer: OTHER GOVERNMENT

## 2018-07-12 VITALS
HEART RATE: 64 BPM | WEIGHT: 187.63 LBS | OXYGEN SATURATION: 99 % | DIASTOLIC BLOOD PRESSURE: 78 MMHG | SYSTOLIC BLOOD PRESSURE: 112 MMHG | HEIGHT: 67 IN | BODY MASS INDEX: 29.45 KG/M2 | TEMPERATURE: 98 F

## 2018-07-12 VITALS
BODY MASS INDEX: 29.45 KG/M2 | HEIGHT: 67 IN | HEART RATE: 64 BPM | RESPIRATION RATE: 12 BRPM | WEIGHT: 187.63 LBS | SYSTOLIC BLOOD PRESSURE: 112 MMHG | DIASTOLIC BLOOD PRESSURE: 78 MMHG

## 2018-07-12 DIAGNOSIS — S92.514A NONDISPLACED FRACTURE OF PROXIMAL PHALANX OF RIGHT LESSER TOE(S), INITIAL ENCOUNTER FOR CLOSED FRACTURE: Primary | ICD-10-CM

## 2018-07-12 DIAGNOSIS — S99.921A INJURY OF RIGHT FOOT, INITIAL ENCOUNTER: ICD-10-CM

## 2018-07-12 DIAGNOSIS — S92.514A CLOSED NONDISPLACED FRACTURE OF PROXIMAL PHALANX OF LESSER TOE OF RIGHT FOOT, INITIAL ENCOUNTER: ICD-10-CM

## 2018-07-12 DIAGNOSIS — S99.921A INJURY OF RIGHT FOOT, INITIAL ENCOUNTER: Primary | ICD-10-CM

## 2018-07-12 DIAGNOSIS — M79.674 TOE PAIN, RIGHT: Primary | ICD-10-CM

## 2018-07-12 PROCEDURE — 73660 X-RAY EXAM OF TOE(S): CPT | Mod: TC,FY,PO,RT

## 2018-07-12 PROCEDURE — 73660 X-RAY EXAM OF TOE(S): CPT | Mod: 26,RT,, | Performed by: RADIOLOGY

## 2018-07-12 PROCEDURE — 99213 OFFICE O/P EST LOW 20 MIN: CPT | Mod: S$PBB,,, | Performed by: PHYSICIAN ASSISTANT

## 2018-07-12 PROCEDURE — 99999 PR PBB SHADOW E&M-EST. PATIENT-LVL IV: CPT | Mod: PBBFAC,,, | Performed by: FAMILY MEDICINE

## 2018-07-12 PROCEDURE — 99214 OFFICE O/P EST MOD 30 MIN: CPT | Mod: S$PBB,,, | Performed by: FAMILY MEDICINE

## 2018-07-12 PROCEDURE — 99213 OFFICE O/P EST LOW 20 MIN: CPT | Mod: PBBFAC,25,27,PO | Performed by: PHYSICIAN ASSISTANT

## 2018-07-12 PROCEDURE — 99214 OFFICE O/P EST MOD 30 MIN: CPT | Mod: PBBFAC,25,PO | Performed by: FAMILY MEDICINE

## 2018-07-12 PROCEDURE — 99999 PR PBB SHADOW E&M-EST. PATIENT-LVL III: CPT | Mod: PBBFAC,,, | Performed by: PHYSICIAN ASSISTANT

## 2018-07-12 RX ORDER — TRAMADOL HYDROCHLORIDE 50 MG/1
50 TABLET ORAL EVERY 8 HOURS PRN
Qty: 20 TABLET | Refills: 0 | Status: SHIPPED | OUTPATIENT
Start: 2018-07-12 | End: 2018-07-23

## 2018-07-12 RX ORDER — LORATADINE 10 MG/1
10 TABLET ORAL DAILY
COMMUNITY
End: 2020-09-22

## 2018-07-12 NOTE — LETTER
July 13, 2018      Neelima Draper MD  [address] [facility] [place] [address]           [facility]  [address] [place] [address]  Phone: [phone]  Fax: [phone]          Patient: Brionna Carrion   MR Number: [id number]   YOB: 1980   Date of Visit: 7/12/2018       Dear Dr. Neelima Draper:    Thank you for referring Brionna Carrion to me for evaluation. Attached you will find relevant portions of my assessment and plan of care.    If you have questions, please do not hesitate to call me. I look forward to following Brionna Carrion along with you.    Sincerely,    Marley Hood PA-C    Enclosure  CC:  No Recipients    If you would like to receive this communication electronically, please contact [email or web address] or [phone] to request more information on [employer] access.    For providers and/or their staff who would like to refer a patient to [facility], please contact us through our one-stop-shop provider referral line, [facility] , at [phone].    If you feel you have received this communication in error or would no longer like to receive these types of communications, please e-mail [email or web address]

## 2018-07-12 NOTE — PROGRESS NOTES
"Subjective:       Patient ID: Brionna Carrion is a 37 y.o. female.    Chief Complaint: Foot Pain (Possible broken toe x10 days)    /78 (BP Location: Right arm, Patient Position: Sitting, BP Method: Large (Manual))   Pulse 64   Temp 98.2 °F (36.8 °C) (Tympanic)   Ht 5' 7" (1.702 m)   Wt 85.1 kg (187 lb 9.8 oz)   LMP 07/05/2018 (Approximate)   SpO2 99%   BMI 29.38 kg/m²     HPI  Right 5th toe area pain for 10 days after kicking into hard object. Able to walk    Review of Systems   Constitutional: Negative for activity change.   Musculoskeletal: Positive for arthralgias and myalgias.       Objective:      Physical Exam   Constitutional: She is oriented to person, place, and time. She appears well-developed and well-nourished. No distress.   HENT:   Head: Normocephalic and atraumatic.   Eyes: EOM are normal. Pupils are equal, round, and reactive to light.   Neck: Normal range of motion. Neck supple.   Cardiovascular: Normal rate.    Pulmonary/Chest: Effort normal. No respiratory distress.   Musculoskeletal: Normal range of motion.        Feet:    Neurological: She is alert and oriented to person, place, and time.   Skin: Skin is warm and dry. She is not diaphoretic.   Nursing note and vitals reviewed.      Assessment:       1. Injury of right foot, initial encounter    2. Closed nondisplaced fracture of proximal phalanx of lesser toe of right foot, initial encounter        Plan:     Brionna was seen today for foot pain.    Diagnoses and all orders for this visit:    Injury of right foot, initial encounter  -     X-Ray Toe 2 or More Views Right- radiologist report:  There is an oblique fracture of the base of the 5th proximal phalanx with equivocal extension into the 5th MTP joint.  No dislocation    Closed nondisplaced fracture of proximal phalanx of lesser toe of right foot, initial encounter  -     Ambulatory referral to Orthopedics      Instructions  1. Orthopedic referral  2. Cam walker  3. Over the counter " ibuprofen 400-600 mg every 6-8 hours as needed for pain    Discussed with pt/family all information and results pertaining to this visit. Discussed diagnosis and plan of treatment.  All questions and concerns were addressed at this time. Pt/family expresses understanding of information and instructions.  Care and follow up instruction provided.

## 2018-07-12 NOTE — PATIENT INSTRUCTIONS
Closed Toe Fracture  Your toe is broken (fractured). This causes local pain, swelling, and sometimes bruising. This injury usually takes about 4 to 6 weeks to heal, but can sometimes take longer. Toe injuries are often treated by taping the injured toe to the next one (buddy taping). This protects the injured toe and holds it in position.     If the toenail has been severely injured, it may fall off in 1 to 2 weeks. It takes up to 12 months for a new toenail to grow back.  Home care  Follow these guidelines when caring for yourself at home:  · You may be given a cast shoe to wear to keep your toe from moving. If not, you can use a sandal or any shoe that doesnt put pressure on the injured toe until the swelling and pain go away. If using a sandal, be careful not to strike your foot against anything. Another injury could make the fracture worse. If you were given crutches, dont put full weight on the injured foot until you can do so without pain, or as directed by your healthcare provider.  · Keep your foot elevated to reduce pain and swelling. When sleeping, put a pillow under the injured leg. When sitting, support the injured leg so it is above your waist. This is very important during the first 2 days (48 hours).  · Put an ice pack on the injured area. Do this for 20 minutes every 1 to 2 hours the first day for pain relief. You can make an ice pack by wrapping a plastic bag of ice cubes in a thin towel. As the ice melts, be careful that any cloth or paper tape doesnt get wet. Continue using the ice pack 3 to 4 times a day for the next 2 days. Then use the ice pack as needed to ease pain and swelling.  · If buddy tape was used and it becomes wet or dirty, change it. You may replace it with paper, plastic, or cloth tape. Cloth tape and paper tapes must be kept dry.  · You may use acetaminophen or ibuprofen to control pain, unless another pain medicine was prescribed. If you have chronic liver or kidney disease,  talk with your healthcare provider before using these medicines. Also talk with your provider if youve had a stomach ulcer or gastrointestinal bleeding.  · You may return to sports or physical education activities after 4 weeks when you can run without pain, or as directed by your healthcare provider.  Follow-up care  Follow up with your healthcare provider in 1 week, or as advised. This is to make sure the bone is healing the way it should.  X-rays may be taken. You will be told of any new findings that may affect your care.  When to seek medical advice  Call your healthcare provider right away if any of these occur:  · Pain or swelling gets worse  · The cast/splint cracks  · The cast and padding get wet and stays wet more than 24 hours  · Bad odor from the cast/splint or wound fluid stains the cast  · Tightness or pressure under the cast/splint gets worse  · Toe becomes cold, blue, numb, or tingly  · You cant move the toe  · Signs of infection: fever, redness, warmth, swelling, or drainage from the wound or cast  · Fever of 100.4ºF (38ºC) or higher, or as directed by your healthcare provider  Date Last Reviewed: 2/1/2017  © 3015-2576 [employer]. [address] [place] [address], [place] [address]. All rights reserved. This information is not intended as a substitute for professional medical care. Always follow your healthcare professional's instructions.

## 2018-07-13 NOTE — PROGRESS NOTES
Subjective:      Patient ID: Brionna Carrion is a 37 y.o. female.    Chief Complaint: Toe Injury and Pain of the Right Foot      HPI: Brionna Carrion  is a 37 y.o. female who c/o Toe Injury and Pain of the Right Foot   for duration of about a week and a half.  She was on vacation with her family.  They were playing a game in the pool when she kicked off of her sons leg. She injured the right small toe at that time.  She did not think anything other than it was jammed.  However, she continues to have pain, so she came to have it checked out.  Pain level is 5/10 in severity.  Quality is throbbing and sharp.  It is constant.  It is improved with immobilization.  It is worsened with weight-bearing.  She complains of associated swelling in the right small toe.    Past Medical History:   Diagnosis Date    Abnormal Pap smear     HPV/ biopsy-- never had a normal pap    Allergy     year around; tried OTC Zyrtec, Allegra, which makes her sleepy; increased water has helped partially    Family history of breast cancer in female     Early breast cancer on mother's side of the family    History of HPV infection 01/01/2002    Initially had abnormal PAPs, except for 2015.     Past Surgical History:   Procedure Laterality Date    APPENDECTOMY      BREAST RECONSTRUCTION      augmentation    TONSILLECTOMY       Family History   Problem Relation Age of Onset    Hypertension Mother     Breast cancer Maternal Grandmother     Hypertension Maternal Grandmother     Glaucoma Maternal Grandmother     Cataracts Maternal Grandmother     Breast cancer Maternal Aunt     Breast cancer Maternal Aunt     Breast cancer Maternal Aunt     Breast cancer Maternal Aunt     Ovarian cancer Neg Hx     Stroke Neg Hx      Social History     Social History    Marital status:      Spouse name: N/A    Number of children: N/A    Years of education: N/A     Occupational History    [employer]     Social History Main Topics     "Smoking status: Never Smoker    Smokeless tobacco: Not on file    Alcohol use Yes      Comment: 2 drinks every 2 weeks    Drug use: No    Sexual activity: Yes     Partners: Male      Comment:  visectomy     Other Topics Concern    Not on file     Social History Narrative    Breakfast: Protein shake (grass fed & powder CLA), water, +5 hour energy drink throughout the day    Lunch: Meat: shrimp & 1/2 sweet potato; water     Dinner: Salad with black olives, olive oil, little salt; water    Snacks: None (no sodas or diet sodas)    Eats out: 1x/mo: [employer]: grilled shrimp, sweet potato, part of a salad; water    PA: 45 min resistance 3x/wk, runs 5 mi 3-4 x/wk, yoga in the sauna "[employer]"    Water: 2 - 24 oz/d + 8 = 56 oz/d         Medication List with Changes/Refills   New Medications    TRAMADOL (ULTRAM) 50 MG TABLET    Take 1 tablet (50 mg total) by mouth every 8 (eight) hours as needed for Pain.   Current Medications    LORATADINE (CLARITIN) 10 MG TABLET    Take 10 mg by mouth once daily.     Review of patient's allergies indicates:  No Known Allergies    Review of Systems   Constitution: Negative for fever.   Cardiovascular: Negative for chest pain.   Respiratory: Negative for cough and shortness of breath.    Skin: Negative for rash.   Musculoskeletal: Positive for joint pain, joint swelling and stiffness.   Gastrointestinal: Negative for heartburn.   Neurological: Negative for headaches and numbness.         Objective:        General    Nursing note and vitals reviewed.  Constitutional: She is oriented to person, place, and time. She appears well-developed and well-nourished.   HENT:   Head: Normocephalic and atraumatic.   Eyes: EOM are normal.   Cardiovascular: Normal rate and regular rhythm.    Pulmonary/Chest: Effort normal.   Abdominal: Soft.   Neurological: She is alert and oriented to person, place, and time.   Psychiatric: She has a normal mood and affect. Her behavior is normal. "         Right Ankle/Foot Exam     Other   Sensation: normal    Comments:  Cap refill < 2 sec  TTP right small toe prox phalanx          Vascular Exam     Right Pulses  Dorsalis Pedis:      2+          Edema  Right Lower Leg: present (Right small toe)            Xray:   Right small toe images and report were reviewed today.  I agree with the radiologist's interpretation.  There is an oblique fracture of the base of the 5th proximal phalanx with equivocal extension into the 5th MTP joint.  No dislocation.    Assessment:       Encounter Diagnosis   Name Primary?    Nondisplaced fracture of proximal phalanx of right lesser toe(s), initial encounter for closed fracture Yes          Plan:       Brionna was seen today for toe injury and pain.    Diagnoses and all orders for this visit:    Nondisplaced fracture of proximal phalanx of right lesser toe(s), initial encounter for closed fracture  -     traMADol (ULTRAM) 50 mg tablet; Take 1 tablet (50 mg total) by mouth every 8 (eight) hours as needed for Pain.    Brionna is a new patient with a new problem.  She is very pleasant active duty  patient. She has a nondisplaced fracture of the proximal phalanx of the right small toe.  The joint surface appears to be well aligned.  We will proceed with conservative treatment in the form of a buddy taping and a postop shoe.  She comes in today with a tall Cam boot.  I will transition her into a postop shoe as a tall Cam boot is more immobilization then she needs for the toe fracture. She will follow up with me in 1-2 weeks for new x-ray of the toe to ensure that it has not displaced.  We have given her a note for the  that states she is not allowed to run at this time. She is having trouble sleeping at night due to throbbing pain. I have advised I will give 1 prescription for tramadol as above. She should not drive while taking it.  She verbalizes understanding and agrees with the above plan.    Follow-up in about 10 days  (around 7/22/2018).          The patient understands, chooses and consents to this plan and accepts all   the risks which include but are not limited to the risks mentioned above.     Disclaimer: This note was prepared using a voice recognition system and is likely to have sound alike errors within the text.

## 2018-07-23 ENCOUNTER — OFFICE VISIT (OUTPATIENT)
Dept: PODIATRY | Facility: CLINIC | Age: 38
End: 2018-07-23
Payer: OTHER GOVERNMENT

## 2018-07-23 ENCOUNTER — OFFICE VISIT (OUTPATIENT)
Dept: ORTHOPEDICS | Facility: CLINIC | Age: 38
End: 2018-07-23
Payer: OTHER GOVERNMENT

## 2018-07-23 ENCOUNTER — HOSPITAL ENCOUNTER (OUTPATIENT)
Dept: RADIOLOGY | Facility: HOSPITAL | Age: 38
Discharge: HOME | End: 2018-07-23
Attending: PHYSICIAN ASSISTANT
Payer: OTHER GOVERNMENT

## 2018-07-23 VITALS — HEIGHT: 67 IN | SYSTOLIC BLOOD PRESSURE: 109 MMHG | DIASTOLIC BLOOD PRESSURE: 67 MMHG | HEART RATE: 69 BPM

## 2018-07-23 VITALS — DIASTOLIC BLOOD PRESSURE: 59 MMHG | SYSTOLIC BLOOD PRESSURE: 93 MMHG | RESPIRATION RATE: 12 BRPM | HEART RATE: 65 BPM

## 2018-07-23 DIAGNOSIS — M79.674 TOE PAIN, RIGHT: ICD-10-CM

## 2018-07-23 DIAGNOSIS — S92.511A CLOSED DISPLACED FRACTURE OF PROXIMAL PHALANX OF LESSER TOE OF RIGHT FOOT, INITIAL ENCOUNTER: Primary | ICD-10-CM

## 2018-07-23 DIAGNOSIS — S92.501A CLOSED FRACTURE OF PHALANX OF RIGHT FIFTH TOE, INITIAL ENCOUNTER: Primary | ICD-10-CM

## 2018-07-23 PROCEDURE — 99213 OFFICE O/P EST LOW 20 MIN: CPT | Mod: PBBFAC,25,27,PO | Performed by: PODIATRIST

## 2018-07-23 PROCEDURE — 99213 OFFICE O/P EST LOW 20 MIN: CPT | Mod: S$PBB,,, | Performed by: PHYSICIAN ASSISTANT

## 2018-07-23 PROCEDURE — 99214 OFFICE O/P EST MOD 30 MIN: CPT | Mod: S$PBB,,, | Performed by: PODIATRIST

## 2018-07-23 PROCEDURE — 73660 X-RAY EXAM OF TOE(S): CPT | Mod: 26,RT,, | Performed by: RADIOLOGY

## 2018-07-23 PROCEDURE — 73660 X-RAY EXAM OF TOE(S): CPT | Mod: TC,FY,PO

## 2018-07-23 PROCEDURE — 99213 OFFICE O/P EST LOW 20 MIN: CPT | Mod: PBBFAC,25,PO | Performed by: PHYSICIAN ASSISTANT

## 2018-07-23 PROCEDURE — 99999 PR PBB SHADOW E&M-EST. PATIENT-LVL III: CPT | Mod: PBBFAC,,, | Performed by: PHYSICIAN ASSISTANT

## 2018-07-23 PROCEDURE — 99999 PR PBB SHADOW E&M-EST. PATIENT-LVL III: CPT | Mod: PBBFAC,,, | Performed by: PODIATRIST
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
## 2018-07-30 NOTE — PROGRESS NOTES
Subjective:     Patient ID: Brionna Carrion is a 37 y.o. female.    Chief Complaint: toe fracture (toe fracture of the right 5th toe, patient stated she injured her foot July 4 plahying volleyball in a pool )    Brionna is a 37 y.o. female who presents to the podiatry clinic with complaint of  right 5th toe pain. Onset of the symptoms was several weeks ago. Precipitating event: injured toe playing volleyball on July 4th. Current symptoms include: ability to bear weight, but with some pain. Aggravating factors: any weight bearing. Symptoms have gradually improved. Patient has had no prior foot problems. Evaluation to date: plain films: .. Treatment to date: walking boot. Patients rates pain 2/10 on pain scale. Patient states she wore the boot fot about 2 weeks and returned to her tennis shoes a couple of days ago with no real pain.       Patient Active Problem List   Diagnosis    Overweight (BMI 25.0-29.9)    Closed displaced fracture of proximal phalanx of lesser toe of left foot    Allergy    Family history of breast cancer in female       Medication List with Changes/Refills   Current Medications    LORATADINE (CLARITIN) 10 MG TABLET    Take 10 mg by mouth once daily.       Review of patient's allergies indicates:  No Known Allergies    Past Surgical History:   Procedure Laterality Date    APPENDECTOMY      BREAST RECONSTRUCTION      augmentation    TONSILLECTOMY         Family History   Problem Relation Age of Onset    Hypertension Mother     Breast cancer Maternal Grandmother     Hypertension Maternal Grandmother     Glaucoma Maternal Grandmother     Cataracts Maternal Grandmother     Breast cancer Maternal Aunt     Breast cancer Maternal Aunt     Breast cancer Maternal Aunt     Breast cancer Maternal Aunt     Ovarian cancer Neg Hx     Stroke Neg Hx        Social History     Social History    Marital status:      Spouse name: N/A    Number of children: N/A    Years of education: N/A  "    Occupational History    [employer]       [employer]     Social History Main Topics    Smoking status: Never Smoker    Smokeless tobacco: Not on file    Alcohol use Yes      Comment: 2 drinks every 2 weeks    Drug use: No    Sexual activity: Yes     Partners: Male      Comment:  visectomy     Other Topics Concern    Not on file     Social History Narrative    Breakfast: Protein shake (grass fed & powder CLA), water, +5 hour energy drink throughout the day    Lunch: Meat: shrimp & 1/2 sweet potato; water     Dinner: Salad with black olives, olive oil, little salt; water    Snacks: None (no sodas or diet sodas)    Eats out: 1x/mo: [place]: grilled shrimp, sweet potato, part of a salad; water    PA: 45 min resistance 3x/wk, runs 5 mi 3-4 x/wk, yoga in the sauna "[employer]"    Water: 2 - 24 oz/d + 8 = 56 oz/d           Vitals:    07/23/18 1448   BP: 109/67   Pulse: 69   Height: 5' 7" (1.702 m)   PainSc: 0-No pain       Review of Systems   Constitutional: Negative for chills and fever.   Respiratory: Negative for shortness of breath.    Cardiovascular: Negative for chest pain, palpitations, orthopnea, claudication and leg swelling.   Gastrointestinal: Negative for diarrhea, nausea and vomiting.   Musculoskeletal: Negative for joint pain.   Skin: Negative for rash.   Neurological: Negative for dizziness, tingling, sensory change, focal weakness and weakness.   Psychiatric/Behavioral: Negative.              Objective:       PHYSICAL EXAM: Apperance: Alert and orient in no distress,well developed, and with good attention to grooming and body habits  Patient presents ambulating in tennis shoes.   Lower Extremity Physical Exam:  VASCULAR: Dorsalis pedis pulses 2/4 bilateral and Posterior Tibial pulses 2/4 bilateral. Capillary fill time <3 seconds bilateral. Mild edema observed bilateral. Varicosities absent bilateral. Skin temperature of the lower extremities is warm to warm, proximal to distal. Hair growth " WNL bilateral.  DERMATOLOGICAL: No skin rashes, subcutaneous nodules, lesions, or ulcers observed bilateral.  NEUROLOGICAL: Light touch, sharp-dull, proprioception all present and equal bilaterally.    MUSCULOSKELETAL: Muscle strength is 5/5 for foot inverters, everters, plantarflexors, and dorsiflexors. Muscle tone is normal. (--) pain on palpation of right 5th toe or ROM.     TEST RESULTS: Radiographs of right toe reveals Again seen is an oblique fracture at the base of the 5th proximal phalanx with probable intra-articular extension into the 5th MTP joint.  This is not significantly changed since the comparison exam.  There is mild surrounding soft tissue swelling        Assessment:       Encounter Diagnosis   Name Primary?    Closed fracture of phalanx of right fifth toe, initial encounter Yes         Plan:   Closed fracture of phalanx of right fifth toe, initial encounter  -     X-Ray Foot Complete Right; Future; Expected date: 07/23/2018      I counseled the patient on her conditions, regarding findings of my examination, my impressions, and usual treatment plan.   Reviewed x-rays in exam room with patient.   Dispensed surgical shoe to be worn for at least 2 weeks and then return to supportive shoes. Patient states she understands.   The patient and I reviewed the types of shoes she should be wearing, my recommendation includes generally the best time of the day for a shoe fitting is the afternoon, shoes with a wide toe box, very good cushion, and tennis shoes with removable inner soles.The patient and I reviewed my recommendations for over-the-counter orthotic inserts.   Ordered right foot x-rays to be taken on next visit.   Patient to return in 1 month.               Jairo Gilbert DPM  [facility]

## 2019-04-17 ENCOUNTER — APPOINTMENT (OUTPATIENT)
Dept: RADIOLOGY | Facility: HOSPITAL | Age: 39
End: 2019-04-17
Attending: FAMILY MEDICINE
Payer: OTHER GOVERNMENT

## 2019-04-17 ENCOUNTER — OFFICE VISIT (OUTPATIENT)
Dept: ORTHOPEDICS | Facility: CLINIC | Age: 39
End: 2019-04-17
Payer: OTHER GOVERNMENT

## 2019-04-17 VITALS
SYSTOLIC BLOOD PRESSURE: 110 MMHG | HEIGHT: 67 IN | HEART RATE: 80 BPM | RESPIRATION RATE: 18 BRPM | WEIGHT: 187 LBS | DIASTOLIC BLOOD PRESSURE: 59 MMHG | BODY MASS INDEX: 29.35 KG/M2

## 2019-04-17 DIAGNOSIS — M25.551 RIGHT HIP PAIN: Primary | ICD-10-CM

## 2019-04-17 DIAGNOSIS — M25.551 RIGHT HIP PAIN: ICD-10-CM

## 2019-04-17 PROCEDURE — 99213 OFFICE O/P EST LOW 20 MIN: CPT | Mod: PBBFAC,25,PN | Performed by: FAMILY MEDICINE

## 2019-04-17 PROCEDURE — 73502 X-RAY EXAM HIP UNI 2-3 VIEWS: CPT | Mod: 26,RT,, | Performed by: RADIOLOGY

## 2019-04-17 PROCEDURE — 99213 PR OFFICE/OUTPT VISIT, EST, LEVL III, 20-29 MIN: ICD-10-PCS | Mod: S$PBB,,, | Performed by: FAMILY MEDICINE

## 2019-04-17 PROCEDURE — 99999 PR PBB SHADOW E&M-EST. PATIENT-LVL III: CPT | Mod: PBBFAC,,, | Performed by: FAMILY MEDICINE

## 2019-04-17 PROCEDURE — 73502 XR HIP 2 VIEW RIGHT: ICD-10-PCS | Mod: 26,RT,, | Performed by: RADIOLOGY

## 2019-04-17 PROCEDURE — 73502 X-RAY EXAM HIP UNI 2-3 VIEWS: CPT | Mod: TC,PO,RT

## 2019-04-17 PROCEDURE — 99999 PR PBB SHADOW E&M-EST. PATIENT-LVL III: ICD-10-PCS | Mod: PBBFAC,,, | Performed by: FAMILY MEDICINE

## 2019-04-17 PROCEDURE — 99213 OFFICE O/P EST LOW 20 MIN: CPT | Mod: S$PBB,,, | Performed by: FAMILY MEDICINE

## 2019-04-17 NOTE — PROGRESS NOTES
Subjective:     Patient ID: Brionna Carrion is a 38 y.o. female.    Chief Complaint: Pain of the Right Hip      HPI:  This patient is in the  complains of right lateral hip pain for the last few months which is worsened over the last few weeks and became much more severe after she had to do a physical training test today.  She is very active physically exercises a lot time is in the .  She normally runs about 4 miles at a time and had had to reduce the intensity of her training a little bit the last few weeks due to the pain in her right hip area.  She does not know of any specific injury and has not fallen.    Past Medical History:   Diagnosis Date    Abnormal Pap smear     HPV/ biopsy-- never had a normal pap    Allergy     year around; tried OTC Zyrtec, Allegra, which makes her sleepy; increased water has helped partially    Family history of breast cancer in female     Early breast cancer on mother's side of the family    History of HPV infection 01/01/2002    Initially had abnormal PAPs, except for 2015.     Past Surgical History:   Procedure Laterality Date    APPENDECTOMY      BREAST RECONSTRUCTION      augmentation    TONSILLECTOMY       Family History   Problem Relation Age of Onset    Hypertension Mother     Breast cancer Maternal Grandmother     Hypertension Maternal Grandmother     Glaucoma Maternal Grandmother     Cataracts Maternal Grandmother     Breast cancer Maternal Aunt     Breast cancer Maternal Aunt     Breast cancer Maternal Aunt     Breast cancer Maternal Aunt     Ovarian cancer Neg Hx     Stroke Neg Hx      Social History     Socioeconomic History    Marital status:      Spouse name: Not on file    Number of children: Not on file    Years of education: Not on file    Highest education level: Not on file   Occupational History    Occupation: [employer]      Comment:  [employer]   Social Needs    Financial resource strain: Not on file    Food  "insecurity:     Worry: Not on file     Inability: Not on file    Transportation needs:     Medical: Not on file     Non-medical: Not on file   Tobacco Use    Smoking status: Never Smoker   Substance and Sexual Activity    Alcohol use: Yes     Comment: 2 drinks every 2 weeks    Drug use: No    Sexual activity: Yes     Partners: Male     Comment:  visectomy   Lifestyle    Physical activity:     Days per week: Not on file     Minutes per session: Not on file    Stress: Not on file   Relationships    Social connections:     Talks on phone: Not on file     Gets together: Not on file     Attends Mosque service: Not on file     Active member of club or organization: Not on file     Attends meetings of clubs or organizations: Not on file     Relationship status: Not on file   Other Topics Concern    Not on file   Social History Narrative    Breakfast: Protein shake (grass fed & powder CLA), water, +5 hour energy drink throughout the day    Lunch: Meat: shrimp & 1/2 sweet potato; water     Dinner: Salad with black olives, olive oil, little salt; water    Snacks: None (no sodas or diet sodas)    Eats out: 1x/mo: [place]: grilled shrimp, sweet potato, part of a salad; water    PA: 45 min resistance 3x/wk, runs 5 mi 3-4 x/wk, yoga in the sauna "[employer]"    Water: 2 - 24 oz/d + 8 = 56 oz/d       Current Outpatient Medications:     loratadine (CLARITIN) 10 mg tablet, Take 10 mg by mouth once daily., Disp: , Rfl:   Review of patient's allergies indicates:  No Known Allergies  Review of Systems   Constitutional: Negative for chills, fever and weight loss.   Respiratory: Negative for shortness of breath.    Cardiovascular: Negative for chest pain and palpitations.       Objective:   Body mass index is 29.29 kg/m².  Vitals:    04/17/19 1527   BP: (!) 110/59   Pulse: 80   Resp: 18           Ortho/SPM Exam alert and oriented, well-nourished well-developed , fit appearing adult female ambulatory in no acute " distress.    Right hip-full range of motion her without pain  Strength good throughout and comparable to the left side  Point tenderness at the right greater trochanter region.  The patient has a secondary region of tenderness more proximally near the iliac crest.  Neurovascular intact  No deformity swelling discoloration or heat.  IMAGING     Radiographs / Imaging : Relevant imaging results reviewed by me and interpreted by me, discussed with the patient and / or family -discussed with the patient that her x-rays look fine and there are no unusual arthritic changes dislocations or fractures.    After discussion the patient decided to have an injection to the bursa of the right trochanter.    Procedure note-right hip prepped and draped in the usual fashion and 23 gauge needle injected over the area of maximal tenderness over the greater trochanter bursa lateral proximal hip region.  He cc of Celestone 3 cc of lidocaine and 2 cc of bupivacaine was injected without difficulty or complication.  The patient tolerated the procedure well    Patient knows to apply ice to the area for 30 min at a time several times tonight and tomorrow and to reduce her training for the next several days.  We will recheck the patient in about 4 weeks to see if she needs a follow-up injection or possibly therapy concerning the secondary area of pain at the iliac crest.  The patient may take ibuprofen 2 to 3 times a day as needed for the next week or 2 in addition to using ice.    Assessment:     No diagnosis found.     Plan:   There are no diagnoses linked to this encounter.      Ko Sparrow M.D.  [facility]

## 2019-05-15 ENCOUNTER — TELEPHONE (OUTPATIENT)
Dept: ORTHOPEDICS | Facility: CLINIC | Age: 39
End: 2019-05-15

## 2019-05-15 NOTE — TELEPHONE ENCOUNTER
I called the patients several times all the phone numbers on file to get her rescheduled for her  orthopedic appointment with , Patient missed her appointment today at the [facility] location..

## 2019-09-23 ENCOUNTER — PATIENT MESSAGE (OUTPATIENT)
Dept: INTERNAL MEDICINE | Facility: CLINIC | Age: 39
End: 2019-09-23

## 2019-10-11 ENCOUNTER — TELEPHONE (OUTPATIENT)
Dept: INTERNAL MEDICINE | Facility: CLINIC | Age: 39
End: 2019-10-11

## 2019-10-11 NOTE — TELEPHONE ENCOUNTER
----- Message from Marsha Oliveira sent at 10/11/2019  7:55 AM CDT -----  Contact: Pt  Please give pt a call at .[phone] (home) [phone] (work) she is calling to see if she can come in today to get swabbed for the flu.

## 2020-02-03 ENCOUNTER — TELEPHONE (OUTPATIENT)
Dept: OBSTETRICS AND GYNECOLOGY | Facility: CLINIC | Age: 40
End: 2020-02-03

## 2020-02-03 NOTE — TELEPHONE ENCOUNTER
Returned call to patient.  [place] [facility] [place]  Appt scheduled 02/19/20 at 3:30 pm, she confirmed appt, location and provider.

## 2020-02-03 NOTE — TELEPHONE ENCOUNTER
----- Message from Dora Robins sent at 2/3/2020  1:30 PM CST -----  Contact: Pt  Pt requesting a call back in regards to scheduling a Annual appt    Please call and advise      Phone [phone]

## 2020-02-14 ENCOUNTER — INITIAL CONSULT (OUTPATIENT)
Dept: PSYCHIATRY | Facility: CLINIC | Age: 40
End: 2020-02-14
Payer: OTHER GOVERNMENT

## 2020-02-14 DIAGNOSIS — Z63.9 DYSFUNCTIONAL FAMILY PROCESSES: ICD-10-CM

## 2020-02-14 DIAGNOSIS — F32.A CHRONIC DEPRESSIVE DISORDER: Primary | ICD-10-CM

## 2020-02-14 DIAGNOSIS — F43.10 PTSD (POST-TRAUMATIC STRESS DISORDER): ICD-10-CM

## 2020-02-14 PROCEDURE — 90791 PSYCH DIAGNOSTIC EVALUATION: CPT | Mod: ,,, | Performed by: SOCIAL WORKER

## 2020-02-14 PROCEDURE — 90791 PR PSYCHIATRIC DIAGNOSTIC EVALUATION: ICD-10-PCS | Mod: ,,, | Performed by: SOCIAL WORKER

## 2020-02-14 SDOH — SOCIAL DETERMINANTS OF HEALTH (SDOH): PROBLEM RELATED TO PRIMARY SUPPORT GROUP, UNSPECIFIED: Z63.9

## 2020-02-17 ENCOUNTER — PATIENT MESSAGE (OUTPATIENT)
Dept: INTERNAL MEDICINE | Facility: CLINIC | Age: 40
End: 2020-02-17
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
## 2020-03-23 ENCOUNTER — PATIENT MESSAGE (OUTPATIENT)
Dept: INTERNAL MEDICINE | Facility: CLINIC | Age: 40
End: 2020-03-23

## 2020-03-24 DIAGNOSIS — R05.9 COUGH: Primary | ICD-10-CM

## 2020-03-24 RX ORDER — CODEINE PHOSPHATE AND GUAIFENESIN 10; 100 MG/5ML; MG/5ML
5 SOLUTION ORAL NIGHTLY PRN
Qty: 118 ML | Refills: 0 | Status: SHIPPED | OUTPATIENT
Start: 2020-03-24 | End: 2020-04-03

## 2020-04-09 ENCOUNTER — NURSE TRIAGE (OUTPATIENT)
Dept: ADMINISTRATIVE | Facility: CLINIC | Age: 40
End: 2020-04-09

## 2020-04-09 ENCOUNTER — PATIENT MESSAGE (OUTPATIENT)
Dept: INTERNAL MEDICINE | Facility: CLINIC | Age: 40
End: 2020-04-09

## 2020-04-10 ENCOUNTER — APPOINTMENT (OUTPATIENT)
Dept: INTERNAL MEDICINE | Facility: CLINIC | Age: 40
End: 2020-04-10
Payer: OTHER GOVERNMENT

## 2020-04-10 DIAGNOSIS — R05.9 COUGH: Primary | ICD-10-CM

## 2020-04-10 PROCEDURE — U0002 COVID-19 LAB TEST NON-CDC: HCPCS

## 2020-04-10 NOTE — TELEPHONE ENCOUNTER
Pt has been feeling bad for about 2weeks, She staerted today running fever 101.6 took tylenol went lie down and fever noww 100. Pt 's step daughter dx yest with strept throat. Pt agrees to OAC and given info on testing site in  tomorrow if they decide she need testing. Pt is in the  and said that she has been trying to just suck it up    Reason for Disposition   [1] Adult has symptoms of COVID-19 (fever, cough or SOB) AND [2] major community spread where patient lives AND [3] testing not being done for mild symptoms   [1] COVID-19 suspected (e.g., cough, fever, shortness of breath) AND [2] public health department recommends testing    Additional Information   Negative: SEVERE difficulty breathing (e.g., struggling for each breath, speaks in single words)   Negative: Difficult to awaken or acting confused (e.g., disoriented, slurred speech)   Negative: Bluish (or gray) lips or face now   Negative: Shock suspected (e.g., cold/pale/clammy skin, too weak to stand, low BP, rapid pulse)   Negative: Sounds like a life-threatening emergency to the triager   Negative: SEVERE difficulty breathing (e.g., struggling for each breath, speak in single words, bluish lips)   Negative: Sounds like a life-threatening emergency to the triager   Negative: [1] Adult has symptoms of COVID-19 (fever, cough, or SOB) AND [2] lab test positive    Protocols used: CORONAVIRUS (COVID-19) EXPOSURE-A-, CORONAVIRUS (COVID-19) DIAGNOSED OR [id number]

## 2020-04-11 LAB — SARS-COV-2 RNA RESP QL NAA+PROBE: NOT DETECTED

## 2020-04-13 ENCOUNTER — TELEPHONE (OUTPATIENT)
Dept: INTERNAL MEDICINE | Facility: CLINIC | Age: 40
End: 2020-04-13

## 2020-04-13 ENCOUNTER — PATIENT MESSAGE (OUTPATIENT)
Dept: INTERNAL MEDICINE | Facility: CLINIC | Age: 40
End: 2020-04-13

## 2020-04-13 RX ORDER — PENICILLIN V POTASSIUM 500 MG/1
500 TABLET, FILM COATED ORAL 2 TIMES DAILY
Qty: 20 TABLET | Refills: 0 | Status: SHIPPED | OUTPATIENT
Start: 2020-04-13 | End: 2020-04-23

## 2020-07-06 ENCOUNTER — OFFICE VISIT (OUTPATIENT)
Dept: FAMILY MEDICINE | Facility: CLINIC | Age: 40
End: 2020-07-06
Payer: OTHER GOVERNMENT

## 2020-07-06 VITALS
WEIGHT: 187.81 LBS | DIASTOLIC BLOOD PRESSURE: 83 MMHG | TEMPERATURE: 98 F | HEIGHT: 67 IN | HEART RATE: 100 BPM | OXYGEN SATURATION: 100 % | BODY MASS INDEX: 29.48 KG/M2 | SYSTOLIC BLOOD PRESSURE: 133 MMHG

## 2020-07-06 DIAGNOSIS — R43.9 PROBLEMS WITH SMELL AND TASTE: ICD-10-CM

## 2020-07-06 DIAGNOSIS — J02.9 SORETHROAT: ICD-10-CM

## 2020-07-06 DIAGNOSIS — M79.10 MUSCLE PAIN: ICD-10-CM

## 2020-07-06 DIAGNOSIS — Z11.9 ENCOUNTER FOR SCREENING EXAMINATION FOR INFECTIOUS DISEASE: Primary | ICD-10-CM

## 2020-07-06 LAB
CTP QC/QA: YES
S PYO RRNA THROAT QL PROBE: NEGATIVE

## 2020-07-06 PROCEDURE — 99999 PR PBB SHADOW E&M-EST. PATIENT-LVL III: ICD-10-PCS | Mod: PBBFAC,,, | Performed by: NURSE PRACTITIONER

## 2020-07-06 PROCEDURE — 87147 CULTURE TYPE IMMUNOLOGIC: CPT

## 2020-07-06 PROCEDURE — 87081 CULTURE SCREEN ONLY: CPT

## 2020-07-06 PROCEDURE — 99999 PR PBB SHADOW E&M-EST. PATIENT-LVL III: CPT | Mod: PBBFAC,,, | Performed by: NURSE PRACTITIONER

## 2020-07-06 PROCEDURE — 99213 OFFICE O/P EST LOW 20 MIN: CPT | Mod: S$PBB,,, | Performed by: NURSE PRACTITIONER

## 2020-07-06 PROCEDURE — 99213 OFFICE O/P EST LOW 20 MIN: CPT | Mod: PBBFAC,PO | Performed by: NURSE PRACTITIONER

## 2020-07-06 PROCEDURE — 99213 PR OFFICE/OUTPT VISIT, EST, LEVL III, 20-29 MIN: ICD-10-PCS | Mod: S$PBB,,, | Performed by: NURSE PRACTITIONER

## 2020-07-06 PROCEDURE — U0003 INFECTIOUS AGENT DETECTION BY NUCLEIC ACID (DNA OR RNA); SEVERE ACUTE RESPIRATORY SYNDROME CORONAVIRUS 2 (SARS-COV-2) (CORONAVIRUS DISEASE [COVID-19]), AMPLIFIED PROBE TECHNIQUE, MAKING USE OF HIGH THROUGHPUT TECHNOLOGIES AS DESCRIBED BY CMS-2020-01-R: HCPCS

## 2020-07-06 PROCEDURE — 87880 STREP A ASSAY W/OPTIC: CPT | Mod: PBBFAC,PO,59 | Performed by: NURSE PRACTITIONER

## 2020-07-06 NOTE — PROGRESS NOTES
"CC:   Chief Complaint   Patient presents with    Sore Throat    Otalgia     HPI: This is a new problem.   Brionna Carrion is a 39 y.o. female with a complaint of URI.  The current episode started in the past 3 days.   The problem has been gradually worsening.   Associated symptoms included chills, sore throat, myalgia.    Pertinent negatives include cough, chest pain, dyspnea, wheezing   Treatments tried: none has been used and this has provided no relief.   Step daughter tested for covid on yesterday    [unfilled]  Outpatient Medications Prior to Visit   Medication Sig Dispense Refill    loratadine (CLARITIN) 10 mg tablet Take 10 mg by mouth once daily.       No facility-administered medications prior to visit.         Physical Exam   /83   Pulse 100   Temp 98.1 °F (36.7 °C)   Ht 5' 7" (1.702 m)   Wt 85.2 kg (187 lb 13.3 oz)   LMP 06/06/2020   SpO2 100%   BMI 29.42 kg/m²   Constitutional: The patient appears well-developed and well-nourished.   Head: Normocephalic and atraumatic.   Right Ear: Tympanic membrane and ear canal normal. No drainage, swelling or tenderness. Tympanic membrane is not injected, not erythematous and not bulging.   Left Ear: Ear canal normal. No drainage, swelling or tenderness. Tympanic membrane is not injected, not erythematous and not bulging.   Nose: Mucosal edema and rhinorrhea present.   Mouth/Throat: Uvula is midline. Posterior oropharyngeal erythema present. No oropharyngeal exudate.        THE MUCOSA IS BOGGY AND ERYTHEMATOUS.     Eyes: Conjunctivae normal and lids are normal. Pupils are equal, round, and reactive to light. Right eye exhibits no discharge. Left eye exhibits no discharge. Right eye exhibits normal extraocular motion. Left eye exhibits normal extraocular motion.   Neck: Trachea normal and normal range of motion. Neck supple. No tracheal tenderness present. No mass and no thyromegaly present.   Cardiovascular: Normal rate, regular rhythm, S1 normal, S2 " normal and normal heart sounds.  Exam reveals no gallop, no S3, no S4 and no friction rub.    No murmur heard.  Pulmonary/Chest: Effort normal and breath sounds normal. No stridor. Not tachypneic. No respiratory distress. The patient has no wheezes. The patient has no rhonchi. The patient has no rales.   Skin: The patient is not diaphoretic.     Encounter Diagnoses   Name Primary?    Encounter for screening examination for infectious disease Yes    Sorethroat     Problems with smell and taste     Muscle pain        PLAN:    Brionna was seen today for sore throat and otalgia.    Diagnoses and all orders for this visit:    Encounter for screening examination for infectious disease  -     Airborne and Contact and Droplet Isolation Status; Standing  -     Airborne and Contact and Droplet Isolation Status  Self isolation pending covid results.    Sorethroat  -     POCT Rapid Strep A  -     Strep A culture, throat    Problems with smell and taste  -     COVID-19 Routine Screening    Muscle pain  -     COVID-19 Routine Screening         Orders Placed This Encounter   Procedures    Strep A culture, throat    COVID-19 Routine Screening     Is this needed for pre-procedure or pre-op testing?->No  Diagnosis:->Muscle pain  Diagnosis:->Problems with smell and taste     Order Specific Question:   Is the patient symptomatic?     Answer:   Yes     Order Specific Question:   Is this needed for pre-procedure or pre-op testing?     Answer:   No     Order Specific Question:   Diagnosis:     Answer:   Muscle pain [[id number]]     Order Specific Question:   Diagnosis:     Answer:   Problems with smell and taste [V41.5.ICD-9-CM]    Airborne and Contact and Droplet Isolation Status     Standing Status:   Standing     Number of Occurrences:   1    POCT Rapid Strep A     RTC if symptoms are worsening or changing significantly or if not improved by the end of therapy.

## 2020-07-09 DIAGNOSIS — J02.0 STREP PHARYNGITIS: Primary | ICD-10-CM

## 2020-07-09 LAB
BACTERIA THROAT CULT: ABNORMAL
BACTERIA THROAT CULT: ABNORMAL
SARS-COV-2 RNA RESP QL NAA+PROBE: DETECTED

## 2020-07-09 RX ORDER — AMOXICILLIN 875 MG/1
875 TABLET, FILM COATED ORAL 2 TIMES DAILY
Qty: 20 TABLET | Refills: 0 | Status: SHIPPED | OUTPATIENT
Start: 2020-07-09 | End: 2020-07-19

## 2020-07-10 DIAGNOSIS — U07.1 COVID-19 VIRUS DETECTED: ICD-10-CM

## 2020-07-14 ENCOUNTER — OFFICE VISIT (OUTPATIENT)
Dept: INTERNAL MEDICINE | Facility: CLINIC | Age: 40
End: 2020-07-14
Payer: OTHER GOVERNMENT

## 2020-07-14 DIAGNOSIS — M54.50 ACUTE LEFT-SIDED LOW BACK PAIN WITHOUT SCIATICA: ICD-10-CM

## 2020-07-14 DIAGNOSIS — U07.1 COVID-19 VIRUS INFECTION: Primary | ICD-10-CM

## 2020-07-14 DIAGNOSIS — R11.0 NAUSEA: ICD-10-CM

## 2020-07-14 PROCEDURE — 99214 OFFICE O/P EST MOD 30 MIN: CPT | Mod: 95,,, | Performed by: NURSE PRACTITIONER

## 2020-07-14 PROCEDURE — 99214 PR OFFICE/OUTPT VISIT, EST, LEVL IV, 30-39 MIN: ICD-10-PCS | Mod: 95,,, | Performed by: NURSE PRACTITIONER

## 2020-07-14 RX ORDER — NAPROXEN 500 MG/1
500 TABLET ORAL 2 TIMES DAILY PRN
Qty: 30 TABLET | Refills: 0 | Status: SHIPPED | OUTPATIENT
Start: 2020-07-14 | End: 2020-09-22

## 2020-07-14 RX ORDER — ONDANSETRON 4 MG/1
4 TABLET, ORALLY DISINTEGRATING ORAL EVERY 8 HOURS PRN
Qty: 10 TABLET | Refills: 0 | Status: SHIPPED | OUTPATIENT
Start: 2020-07-14 | End: 2020-09-17

## 2020-07-14 RX ORDER — TIZANIDINE 4 MG/1
4 TABLET ORAL EVERY 8 HOURS PRN
Qty: 30 TABLET | Refills: 0 | Status: SHIPPED | OUTPATIENT
Start: 2020-07-14 | End: 2020-07-24

## 2020-07-14 NOTE — PROGRESS NOTES
Subjective:       Patient ID: Brionna Carrion is a 39 y.o. female.    Chief Complaint: No chief complaint on file.    The patient location is: [place]  The chief complaint leading to consultation is: Back pain, COVID    Visit type: audiovisual    Face to Face time with patient: 15 minutes  15 minutes of total time spent on the encounter, which includes face to face time and non-face to face time preparing to see the patient (eg, review of tests), Obtaining and/or reviewing separately obtained history, Documenting clinical information in the electronic or other health record, Independently interpreting results (not separately reported) and communicating results to the patient/family/caregiver, or Care coordination (not separately reported).     Each patient to whom he or she provides medical services by telemedicine is:  (1) informed of the relationship between the physician and patient and the respective role of any other health care provider with respect to management of the patient; and (2) notified that he or she may decline to receive medical services by telemedicine and may withdraw from such care at any time.    Notes:   Mrs. Carrion presents to virtual visit for complaints of back pain and muscle pain. She was diagnosed with strept on 7/6/2020, and sent home with amoxil. She went to [facility] two days ago when she felt like something was sitting on her chest. She had normal xray, EKG, and labs. She was diagnosed with bronchitis 2/2 COVID, and sent home with albuterol inhaler. She has hx of chronic back spasm which she takes norco for intermittently, but has been having increase since dxd with COVID.     Back Pain  This is a chronic problem. The current episode started yesterday. The problem occurs intermittently. The problem has been waxing and waning since onset. The pain is present in the lumbar spine. The quality of the pain is described as shooting and stabbing. The pain does not radiate. The pain is at a  severity of 5/10. The pain is moderate. The pain is the same all the time. The symptoms are aggravated by bending, coughing, position, lying down, sitting, standing, stress and twisting. Stiffness is present in the morning. Associated symptoms include abdominal pain, chest pain, headaches and leg pain. Pertinent negatives include no bladder incontinence, bowel incontinence, dysuria, fever, numbness, paresis, paresthesias, tingling or weakness. Risk factors: current COVID infection. She has tried analgesics and ice for the symptoms. The treatment provided mild relief.       Patient Active Problem List   Diagnosis    Overweight (BMI 25.0-29.9)    Closed displaced fracture of proximal phalanx of lesser toe of left foot    Allergy    Family history of breast cancer in female    COVID-19 virus infection       Family History   Problem Relation Age of Onset    Hypertension Mother     Breast cancer Maternal Grandmother     Hypertension Maternal Grandmother     Glaucoma Maternal Grandmother     Cataracts Maternal Grandmother     Breast cancer Maternal Aunt     Breast cancer Maternal Aunt     Breast cancer Maternal Aunt     Breast cancer Maternal Aunt     Ovarian cancer Neg Hx     Stroke Neg Hx      Past Surgical History:   Procedure Laterality Date    APPENDECTOMY      BREAST RECONSTRUCTION      augmentation    TONSILLECTOMY           Current Outpatient Medications:     loratadine (CLARITIN) 10 mg tablet, Take 10 mg by mouth once daily., Disp: , Rfl:     naproxen (NAPROSYN) 500 MG tablet, Take 1 tablet (500 mg total) by mouth 2 (two) times daily as needed., Disp: 30 tablet, Rfl: 0    ondansetron (ZOFRAN-ODT) 4 MG TbDL, Take 1 tablet (4 mg total) by mouth every 8 (eight) hours as needed., Disp: 10 tablet, Rfl: 0    tiZANidine (ZANAFLEX) 4 MG tablet, Take 1 tablet (4 mg total) by mouth every 8 (eight) hours as needed., Disp: 30 tablet, Rfl: 0    Review of Systems   Constitutional: Positive for activity  "change, appetite change, chills and fatigue. Negative for fever.   HENT: Positive for sore throat. Negative for congestion, postnasal drip, rhinorrhea, sinus pressure and sinus pain.    Respiratory: Positive for cough and shortness of breath (with exertion).    Cardiovascular: Positive for chest pain.   Gastrointestinal: Positive for abdominal pain, diarrhea and nausea. Negative for blood in stool, bowel incontinence, constipation and vomiting.   Genitourinary: Negative for bladder incontinence, dysuria, flank pain and frequency.   Musculoskeletal: Positive for arthralgias, back pain (back spasms) and myalgias.   Neurological: Positive for light-headedness and headaches. Negative for dizziness, tingling, weakness, numbness and paresthesias.       Objective:   There were no vitals taken for this visit.     Physical Exam  Constitutional:       General: She is not in acute distress.     Appearance: She is ill-appearing.      Comments: Lying in bed   Pulmonary:      Effort: Pulmonary effort is normal. No respiratory distress.      Comments: Dry cough noted  Neurological:      General: No focal deficit present.      Mental Status: She is alert and oriented to person, place, and time.         Assessment & Plan     Problem List Items Addressed This Visit        Other    COVID-19 virus infection - Primary    Current Assessment & Plan     Advised pt to continue to monitor symptoms, and given ED precautions. Continue to quarantine per CDC guidelines.            Other Visit Diagnoses     Acute left-sided low back pain without sciatica        Relevant Medications    naproxen (NAPROSYN) 500 MG tablet    tiZANidine (ZANAFLEX) 4 MG tablet    Nausea        Relevant Medications    ondansetron (ZOFRAN-ODT) 4 MG TbDL        No follow-ups on file.            Portions of this note may have been created with voice recognition software. Occasional "wrong-word" or "sound-a-like" substitutions may have occurred due to the inherent " limitations of voice recognition software. Please, read the note carefully and recognize, using context, where substitutions have occurred.

## 2020-07-17 ENCOUNTER — NURSE TRIAGE (OUTPATIENT)
Dept: ADMINISTRATIVE | Facility: CLINIC | Age: 40
End: 2020-07-17

## 2020-07-17 NOTE — TELEPHONE ENCOUNTER
Reason for Disposition   Message left on identified voicemail    Additional Information   Negative: Caller has already spoken with the PCP (or office), and has no further questions   Negative: Caller has already spoken with another triager and has no further questions   Negative: Caller has already spoken with another triager or PCP (or office), and has further questions and triager able to answer questions.   Negative: No answer.  First attempt to contact caller.  Follow-up call scheduled within 15 minutes.   Negative: Busy signal.  First attempt to contact caller.  Follow-up call scheduled within 15 minutes.    Protocols used: NO CONTACT OR DUPLICATE CONTACT CALL-A-OH

## 2020-07-21 PROBLEM — U07.1 COVID-19 VIRUS INFECTION: Status: ACTIVE | Noted: 2020-07-21

## 2020-07-21 NOTE — ASSESSMENT & PLAN NOTE
Advised pt to continue to monitor symptoms, and given ED precautions. Continue to quarantine per CDC guidelines.

## 2020-09-16 ENCOUNTER — PATIENT MESSAGE (OUTPATIENT)
Dept: INTERNAL MEDICINE | Facility: CLINIC | Age: 40
End: 2020-09-16

## 2020-09-17 ENCOUNTER — OFFICE VISIT (OUTPATIENT)
Dept: DERMATOLOGY | Facility: CLINIC | Age: 40
End: 2020-09-17
Payer: OTHER GOVERNMENT

## 2020-09-17 DIAGNOSIS — R22.9 SUBCUTANEOUS NODULE: Primary | ICD-10-CM

## 2020-09-17 PROCEDURE — 99999 PR PBB SHADOW E&M-EST. PATIENT-LVL II: CPT | Mod: PBBFAC,,, | Performed by: STUDENT IN AN ORGANIZED HEALTH CARE EDUCATION/TRAINING PROGRAM

## 2020-09-17 PROCEDURE — 99999 PR PBB SHADOW E&M-EST. PATIENT-LVL II: ICD-10-PCS | Mod: PBBFAC,,, | Performed by: STUDENT IN AN ORGANIZED HEALTH CARE EDUCATION/TRAINING PROGRAM

## 2020-09-17 PROCEDURE — 99202 OFFICE O/P NEW SF 15 MIN: CPT | Mod: S$PBB,,, | Performed by: STUDENT IN AN ORGANIZED HEALTH CARE EDUCATION/TRAINING PROGRAM

## 2020-09-17 PROCEDURE — 99202 PR OFFICE/OUTPT VISIT, NEW, LEVL II, 15-29 MIN: ICD-10-PCS | Mod: S$PBB,,, | Performed by: STUDENT IN AN ORGANIZED HEALTH CARE EDUCATION/TRAINING PROGRAM

## 2020-09-17 PROCEDURE — 99212 OFFICE O/P EST SF 10 MIN: CPT | Mod: PBBFAC | Performed by: STUDENT IN AN ORGANIZED HEALTH CARE EDUCATION/TRAINING PROGRAM

## 2020-09-17 RX ORDER — ALBUTEROL SULFATE 90 UG/1
AEROSOL, METERED RESPIRATORY (INHALATION)
COMMUNITY
Start: 2020-07-12

## 2020-09-17 NOTE — PATIENT INSTRUCTIONS
XEROSIS (DRY SKIN)        1. Definition    Xerosis is the term for dry skin.  We all have a natural oil coating over our skin produced by the skin oil glands.  If this oil is removed, the skin becomes dry which can lead to cracking, which can lead to inflammation.  Xerosis is usually a long-term problem that recurs often, especially in the winter.    2. Cause     Long hot baths or showers can remove our natural oil and lead to xerosis.  One should never take more than one bath or shower a day and for no longer than ten minutes.   Use of harsh soaps such as Zest, Dial, and Ivory can worsen and cause xerosis.   Cold winter weather worsens xerosis because the amount of moisture contained in cold air is much less than the amount of moisture in warm air.    3. Treatment     Treatment is intended to restore the natural oil to your skin.  Keep the skin lubricated.     Do not take more than one bath or shower a day.  Use lukewarm water, not hot.  Hot water dries out the skin.     Use a gentle moisturizing soap such as Cetaphil soap, Oil of Olay, Dove, Basis, Ivory moisture care, Restoraderm cleanser.     When toweling dry, dont rub.  Blot the skin so there is still some water left on the skin.  You should apply a moisturizing cream to all of the skin such as Cerave cream, Cetaphil cream, Restoraderm or Eucerin Original Formula cream.   Alpha hydroxyacid lotions, i.e., AmLactin, also work very well for preventing dry skin, but may burn when used on inflamed or reddened skin.     If you like to swim during the winter months, you should not use soap when getting out of the pool.  When you have finished swimming, rinse off the chlorine with cool to warm water.  If this will be the only shower of the day, then you may use Cetaphil or another mild soap to cleanse your skin.  After the shower, apply a moisturizing cream to all of the skin as above.

## 2020-09-17 NOTE — PROGRESS NOTES
Subjective:       Patient ID:  Brionna Carrion is a 39 y.o. female who presents for   Chief Complaint   Patient presents with    Cyst     History of Present Illness: The patient presents with chief complaint of a cyst.  Location: back  Duration: months  Signs/Symptoms: growing  Prior treatments: none          Review of Systems   Skin: Negative for itching, rash and dry skin.        Objective:    Physical Exam   Constitutional: She appears well-developed and well-nourished. No distress.   Neurological: She is alert and oriented to person, place, and time. She is not disoriented.   Psychiatric: She has a normal mood and affect.   Skin:   Areas Examined (abnormalities noted in diagram):   Head / Face Inspection Performed  Neck Inspection Performed  Back Inspection Performed              Diagram Legend     Erythematous scaling macule/papule c/w actinic keratosis       Vascular papule c/w angioma      Pigmented verrucoid papule/plaque c/w seborrheic keratosis      Yellow umbilicated papule c/w sebaceous hyperplasia      Irregularly shaped tan macule c/w lentigo     1-2 mm smooth white papules consistent with Milia      Movable subcutaneous cyst with punctum c/w epidermal inclusion cyst      Subcutaneous movable cyst c/w pilar cyst      Firm pink to brown papule c/w dermatofibroma      Pedunculated fleshy papule(s) c/w skin tag(s)      Evenly pigmented macule c/w junctional nevus     Mildly variegated pigmented, slightly irregular-bordered macule c/w mildly atypical nevus      Flesh colored to evenly pigmented papule c/w intradermal nevus       Pink pearly papule/plaque c/w basal cell carcinoma      Erythematous hyperkeratotic cursted plaque c/w SCC      Surgical scar with no sign of skin cancer recurrence      Open and closed comedones      Inflammatory papules and pustules      Verrucoid papule consistent consistent with wart     Erythematous eczematous patches and plaques     Dystrophic onycholytic nail with subungual  debris c/w onychomycosis     Umbilicated papule    Erythematous-base heme-crusted tan verrucoid plaque consistent with inflamed seborrheic keratosis     Erythematous Silvery Scaling Plaque c/w Psoriasis     See annotation      Assessment / Plan:        Subcutaneous nodule  Reassurance given to patient. No treatment is necessary.   Discussed treatment options - excision vs observation. Cysts may recur with excision. Will schedule for surgical removal.                Follow up if symptoms worsen or fail to improve.

## 2020-09-18 ENCOUNTER — PATIENT MESSAGE (OUTPATIENT)
Dept: INTERNAL MEDICINE | Facility: CLINIC | Age: 40
End: 2020-09-18

## 2020-09-18 NOTE — TELEPHONE ENCOUNTER
I'm sorry but I have never seen her. Request virtual visit. I have to know what all these referrals are for to associate the right diagnosis.

## 2020-09-20 ENCOUNTER — PATIENT OUTREACH (OUTPATIENT)
Dept: ADMINISTRATIVE | Facility: OTHER | Age: 40
End: 2020-09-20

## 2020-09-20 DIAGNOSIS — Z12.31 ENCOUNTER FOR SCREENING MAMMOGRAM FOR MALIGNANT NEOPLASM OF BREAST: Primary | ICD-10-CM

## 2020-09-20 NOTE — PROGRESS NOTES
Chart Reviewed  Care Everywhere updated  Immunizations reconciled  Health Maintenance updated  Ordered: mammogram  Upcoming:       Patient Instructions by Emir Saldivar CMA at 10/06/17 02:54 PM     Author:  Emir Saldivar CMA Service:  (none) Author Type:  Certified Medical Assistant     Filed:  10/06/17 02:55 PM Encounter Date:  10/6/2017 Status:  Signed     :  Emir Saldivar CMA (Certified Medical Assistant)            [facility] Lab Hours  You may visit any of our locations for blood work. No appointment is needed.     [place] Phone [phone]  [facility]  Phone [phone]   Mon - Thurs 7am - 7:30pm  Mon - Fri 7am - 5pm   Fri 7am - 5:30pm  Saturday 7am - 12 noon   Saturday  7am - 12:00 noon             [place]        Phone [phone]  [place] Phone [phone]   Mon, Tues & Thurs  6:30am - 6:30pm  Mon 7am - 7pm   Wednesday 6:30am - 7pm  Tues - Fri 7am - 5pm   Friday  6:30am - 5pm  Saturday  7am - 12 noon   Saturday  6:30am - [facility]  Phone [phone]  [place] Phone [phone]   Mon, Tues, Wed & Fri 8am - 5pm  Mon - Fri 7am - 5:30pm   Thursday    8am - 7pm      Saturday [facility]  [facility] [phone]  [place] Phone [phone]   Mon, Thurs & Fri 7:30am - 4pm   Call for hours   Tuesday 7:30am - 7pm      Wednesday  7:30am - 12 noon  [place] Phone [phone]   Saturday  7:30am - 11am  Monday 7:30am - 6:pm      Tues - Fri 7:30am - 5pm    [place] Phone [phone]  Sat (Intermittent dates - please call to confirm) 8am - 12 noon   Monday   8am-7pm Closed 12 to 12:30      Tues & Thurs 8am-7pm  [place] [phone]   Wednesday 8am-5pm  Mon-Thurs 8am - 4:30pm Closed 12 to 1   Friday 8am-5pm Closed 12 to 12:30  Fri 8am - 12 noon   Saturday 8am-12 noon               General information when having a lab test:  Fasting - No caloric intake (WATER IS OKAY) for a minimum of 8-10 hours before your blood draw: NO black coffee and NO gum chewing (even sugar free)  · Tests that commonly require fasting are:  · Cholesterol (lipid panel)  · Basic chemistry  panel  · Comprehensive chemistry panel  · Glucose (blood sugar)        Medicine - You can take any prescribed or routine medicine during your fast.    Brushing Teeth - You can brush your teeth even if you are fasting.    Getting Your Results - Your doctor’s office will notify you of your results within 10 working days.    Medicare payment for lab testing -  depending on what test is ordered, when you last had that test and the reason for having the test, Medicare may not pay.  The lab will try to determine if payment will be a problem before you have your blood drawn.  If it is determined that Medicare will not pay for the ordered test, we will ask you to sign a form indicating you will be responsible for any costs.          Revision History        User Key Date/Time User Provider Type Action    > [N/A] 10/06/17 02:55 PM Emir Saldivar CMA Certified Medical Assistant Sign

## 2020-09-21 ENCOUNTER — PATIENT OUTREACH (OUTPATIENT)
Dept: ADMINISTRATIVE | Facility: OTHER | Age: 40
End: 2020-09-21

## 2020-09-22 ENCOUNTER — OFFICE VISIT (OUTPATIENT)
Dept: INTERNAL MEDICINE | Facility: CLINIC | Age: 40
End: 2020-09-22
Payer: OTHER GOVERNMENT

## 2020-09-22 ENCOUNTER — TELEPHONE (OUTPATIENT)
Dept: INTERNAL MEDICINE | Facility: CLINIC | Age: 40
End: 2020-09-22

## 2020-09-22 DIAGNOSIS — Z30.09 ENCOUNTER FOR OTHER GENERAL COUNSELING OR ADVICE ON CONTRACEPTION: ICD-10-CM

## 2020-09-22 DIAGNOSIS — H54.7 VISION IMPAIRMENT: ICD-10-CM

## 2020-09-22 DIAGNOSIS — Z86.16 HISTORY OF 2019 NOVEL CORONAVIRUS DISEASE (COVID-19): ICD-10-CM

## 2020-09-22 DIAGNOSIS — L72.3 SEBACEOUS CYST: ICD-10-CM

## 2020-09-22 DIAGNOSIS — Z00.00 ROUTINE HEALTH MAINTENANCE: Primary | ICD-10-CM

## 2020-09-22 DIAGNOSIS — J30.2 SEASONAL ALLERGIES: ICD-10-CM

## 2020-09-22 PROBLEM — Z30.9 CONTRACEPTIVE MANAGEMENT: Status: ACTIVE | Noted: 2020-09-22

## 2020-09-22 PROBLEM — U07.1 COVID-19 VIRUS INFECTION: Status: RESOLVED | Noted: 2020-07-21 | Resolved: 2020-09-22

## 2020-09-22 PROBLEM — S92.512A CLOSED DISPLACED FRACTURE OF PROXIMAL PHALANX OF LESSER TOE OF LEFT FOOT: Status: RESOLVED | Noted: 2017-08-07 | Resolved: 2020-09-22

## 2020-09-22 PROCEDURE — 99396 PR PREVENTIVE VISIT,EST,40-64: ICD-10-PCS | Mod: 95,,, | Performed by: FAMILY MEDICINE

## 2020-09-22 PROCEDURE — 99396 PREV VISIT EST AGE 40-64: CPT | Mod: 95,,, | Performed by: FAMILY MEDICINE

## 2020-09-22 NOTE — ASSESSMENT & PLAN NOTE
Reminded to get flu shot this season within the next couple weeks.  Not due for any specific lab testing at this time since she is not on any medication keep follow-up with OBGYN for Pap smear and other counseling

## 2020-09-22 NOTE — ASSESSMENT & PLAN NOTE
Referring to pulmonology.  May benefit from PFT testing although I told her if she is feeling much better before the appointment she may consider cancelling.

## 2020-09-22 NOTE — PROGRESS NOTES
Subjective:       Patient ID: Brionna Carrion is a 39 y.o. female.    The patient location is:  [place]  The chief complaint leading to consultation is: Annual Exam    Visit type: audiovisual  Total time spent with patient: 10  Each patient to whom he or she provides medical services by telemedicine is:  (1) informed of the relationship between the physician and patient and the respective role of any other health care provider with respect to management of the patient; and (2) notified that he or she may decline to receive medical services by telemedicine and may withdraw from such care at any time.    HPI  Video visit completed with patient today for routine annual visit.  She is requesting some refills and efforts to catch up on things that she has been procrastinating on.  1.  She needs referral to dermatology.  Has already seen them and is going to be getting a cyst removed from her back.  2.  Needing referral to allergist.  She originally is from [place] and since moving here about 10 years ago has continued to struggle with constant postnasal drip and environmental allergy issues.  She frequently has dark circles under her eyes and is wondering what else can be done.  She has tried Claritin and other over-the-counter medications intermittently.  3.  Also requesting referral to eye doctor to have comprehensive exam.  She feels over the past few years for vision has been getting worse.  She does not currently wear glasses.  No acute change in vision.  4.  also needing referral to OBGYN for routine evaluation.  She also is considering getting a hysterectomy as she has no intentions for future childbirth.  Originally she had mentioned that she wanted her ovaries taken out but I discouraged her from doing this as it would place her in unnecessary premature menopause    Lastly, she had COVID in July of this year.  Overall feels much better however she enjoys running and has noticed that her cardiovascular and  pulmonary fitness has not returned to the level that it was although it is gradually getting better.  Family History   Problem Relation Age of Onset    Hypertension Mother     Breast cancer Maternal Grandmother     Hypertension Maternal Grandmother     Glaucoma Maternal Grandmother     Cataracts Maternal Grandmother     Breast cancer Maternal Aunt     Breast cancer Maternal Aunt     Breast cancer Maternal Aunt     Breast cancer Maternal Aunt     Ovarian cancer Neg Hx     Stroke Neg Hx        Current Outpatient Medications:     PROAIR HFA 90 mcg/actuation inhaler, INHALE 2 PUFFS INTO THE LUNGS Q 4 HOURS PRN FOR SOB OR WHEEZING, Disp: , Rfl:     Review of Systems   Constitutional: Negative for chills and fever.   Respiratory: Positive for shortness of breath (with running). Negative for cough.    Cardiovascular: Negative for chest pain.   Gastrointestinal: Negative for abdominal pain.   Skin: Negative for rash.   Neurological: Negative for dizziness.       Objective:   LMP 09/10/2020      Physical Exam  Constitutional:       General: She is not in acute distress.     Appearance: She is well-developed.   HENT:      Head: Normocephalic.   Pulmonary:      Effort: Pulmonary effort is normal. No respiratory distress.   Neurological:      Mental Status: She is alert and oriented to person, place, and time.   Psychiatric:         Behavior: Behavior normal.         Assessment & Plan     Problem List Items Addressed This Visit        Ophtho    Vision impairment    Current Assessment & Plan     Referring to ophthalmology.  Patient already has appointment to get scheduled for eye exam            Derm    Sebaceous cyst    Current Assessment & Plan     Keep follow-up with dermatology for removal            Renal/    Contraceptive management    Current Assessment & Plan     Requesting to see OBGYN to discuss possible hysterectomy.            Other    Seasonal allergies    Current Assessment & Plan     Referral to  allergy to consult on possible testing and symptomatic relief.         History of 2019 novel coronavirus disease (COVID-19)    Current Assessment & Plan     Referring to pulmonology.  May benefit from PFT testing although I told her if she is feeling much better before the appointment she may consider cancelling.         Routine health maintenance - Primary    Current Assessment & Plan     Reminded to get flu shot this season within the next couple weeks.  Not due for any specific lab testing at this time since she is not on any medication keep follow-up with OBGYN for Pap smear and other counseling                 No follow-ups on file.    Disclaimer:  This note may have been prepared using voice recognition software, it may have not been extensively proofed, as such there could be errors within the text such as sound alike errors.

## 2020-10-01 ENCOUNTER — OFFICE VISIT (OUTPATIENT)
Dept: OBSTETRICS AND GYNECOLOGY | Facility: CLINIC | Age: 40
End: 2020-10-01
Payer: OTHER GOVERNMENT

## 2020-10-01 ENCOUNTER — PROCEDURE VISIT (OUTPATIENT)
Dept: DERMATOLOGY | Facility: CLINIC | Age: 40
End: 2020-10-01
Payer: OTHER GOVERNMENT

## 2020-10-01 ENCOUNTER — PATIENT MESSAGE (OUTPATIENT)
Dept: OBSTETRICS AND GYNECOLOGY | Facility: CLINIC | Age: 40
End: 2020-10-01

## 2020-10-01 VITALS
DIASTOLIC BLOOD PRESSURE: 76 MMHG | WEIGHT: 185.5 LBS | BODY MASS INDEX: 29.11 KG/M2 | SYSTOLIC BLOOD PRESSURE: 114 MMHG | HEIGHT: 67 IN

## 2020-10-01 DIAGNOSIS — Z30.09 ENCOUNTER FOR OTHER GENERAL COUNSELING OR ADVICE ON CONTRACEPTION: ICD-10-CM

## 2020-10-01 DIAGNOSIS — N92.0 MENORRHAGIA WITH REGULAR CYCLE: ICD-10-CM

## 2020-10-01 DIAGNOSIS — Z12.4 SCREENING FOR CERVICAL CANCER: Primary | ICD-10-CM

## 2020-10-01 DIAGNOSIS — B37.31 YEAST VAGINITIS: ICD-10-CM

## 2020-10-01 DIAGNOSIS — R22.9 SUBCUTANEOUS NODULE: Primary | ICD-10-CM

## 2020-10-01 PROCEDURE — 11402 PR EXC SKIN BENIG 1.1-2 CM TRUNK,ARM,LEG: ICD-10-PCS | Mod: S$PBB,51,, | Performed by: STUDENT IN AN ORGANIZED HEALTH CARE EDUCATION/TRAINING PROGRAM

## 2020-10-01 PROCEDURE — 12031 INTMD RPR S/A/T/EXT 2.5 CM/<: CPT | Mod: S$PBB,,, | Performed by: STUDENT IN AN ORGANIZED HEALTH CARE EDUCATION/TRAINING PROGRAM

## 2020-10-01 PROCEDURE — 99999 PR PBB SHADOW E&M-EST. PATIENT-LVL IV: ICD-10-PCS | Mod: PBBFAC,,, | Performed by: OBSTETRICS & GYNECOLOGY

## 2020-10-01 PROCEDURE — 11402 EXC TR-EXT B9+MARG 1.1-2 CM: CPT | Mod: S$PBB,51,, | Performed by: STUDENT IN AN ORGANIZED HEALTH CARE EDUCATION/TRAINING PROGRAM

## 2020-10-01 PROCEDURE — 88304 TISSUE EXAM BY PATHOLOGIST: CPT | Mod: 26,,, | Performed by: PATHOLOGY

## 2020-10-01 PROCEDURE — 88304 TISSUE EXAM BY PATHOLOGIST: CPT | Performed by: PATHOLOGY

## 2020-10-01 PROCEDURE — 99214 OFFICE O/P EST MOD 30 MIN: CPT | Mod: PBBFAC,PN | Performed by: OBSTETRICS & GYNECOLOGY

## 2020-10-01 PROCEDURE — 11422 EXC H-F-NK-SP B9+MARG 1.1-2: CPT | Mod: PBBFAC | Performed by: STUDENT IN AN ORGANIZED HEALTH CARE EDUCATION/TRAINING PROGRAM

## 2020-10-01 PROCEDURE — 99386 PR PREVENTIVE VISIT,NEW,40-64: ICD-10-PCS | Mod: S$PBB,,, | Performed by: OBSTETRICS & GYNECOLOGY

## 2020-10-01 PROCEDURE — 99999 PR PBB SHADOW E&M-EST. PATIENT-LVL IV: CPT | Mod: PBBFAC,,, | Performed by: OBSTETRICS & GYNECOLOGY

## 2020-10-01 PROCEDURE — 99499 NO LOS: ICD-10-PCS | Mod: S$PBB,,, | Performed by: STUDENT IN AN ORGANIZED HEALTH CARE EDUCATION/TRAINING PROGRAM

## 2020-10-01 PROCEDURE — 88175 CYTOPATH C/V AUTO FLUID REDO: CPT

## 2020-10-01 PROCEDURE — 99386 PREV VISIT NEW AGE 40-64: CPT | Mod: S$PBB,,, | Performed by: OBSTETRICS & GYNECOLOGY

## 2020-10-01 PROCEDURE — 12031 INTMD RPR S/A/T/EXT 2.5 CM/<: CPT | Mod: PBBFAC | Performed by: STUDENT IN AN ORGANIZED HEALTH CARE EDUCATION/TRAINING PROGRAM

## 2020-10-01 PROCEDURE — 87624 HPV HI-RISK TYP POOLED RSLT: CPT

## 2020-10-01 PROCEDURE — 99499 UNLISTED E&M SERVICE: CPT | Mod: S$PBB,,, | Performed by: STUDENT IN AN ORGANIZED HEALTH CARE EDUCATION/TRAINING PROGRAM

## 2020-10-01 PROCEDURE — 12031 PR LAYR CLOS WND TRUNK,ARM,LEG <2.5 CM: ICD-10-PCS | Mod: S$PBB,,, | Performed by: STUDENT IN AN ORGANIZED HEALTH CARE EDUCATION/TRAINING PROGRAM

## 2020-10-01 PROCEDURE — 88304 PR  SURG PATH,LEVEL III: ICD-10-PCS | Mod: 26,,, | Performed by: PATHOLOGY

## 2020-10-01 RX ORDER — AMOXICILLIN 250 MG/1
250 CAPSULE ORAL
COMMUNITY
End: 2020-12-11

## 2020-10-01 RX ORDER — FLUCONAZOLE 200 MG/1
200 TABLET ORAL DAILY
Qty: 3 TABLET | Refills: 0 | Status: SHIPPED | OUTPATIENT
Start: 2020-10-01 | End: 2020-10-04

## 2020-10-01 NOTE — PROGRESS NOTES
Subjective:       Patient ID: Brionna Carrion is a 39 y.o. female.    Chief Complaint:  Annual Exam and Consult      History of Present Illness  HPI  Annual Exam-Premenopausal  Patient presents for annual exam. The patient c/o heavy menses (interested in options) and bump on left side; . The patient is sexually active-- with vasectomy; . GYN screening history: last pap: approximate date  and was normal and last mammogram: patient has never had a mammogram. The patient wears seatbelts: yes. The patient participates in regular exercise: yes. Has the patient ever been transfused or tattooed?: no. The patient reports that there is not domestic violence in her life.    Menses monthly, flow 10 days; tampons-super; change q 1-2 hrs (have to); can soak through tampon; tolerable dysmenorrhea;           GYN & OB History  Patient's last menstrual period was 09/10/2020.   Date of Last Pap: 2017    OB History    Para Term  AB Living   3 2 2   1 2   SAB TAB Ectopic Multiple Live Births     1     2      # Outcome Date GA Lbr Greg/2nd Weight Sex Delivery Anes PTL Lv   3 TAB            2 Term      Vag-Spont   [place]   1 Term      Vag-Spont   [place]       Review of Systems  Review of Systems   Genitourinary: Positive for menorrhagia and menstrual problem.   All other systems reviewed and are negative.          Objective:      Physical Exam:   Constitutional: She appears well-developed.     Eyes: Pupils are equal, round, and reactive to light. Conjunctivae and EOM are normal.    Neck: Normal range of motion. Neck supple.     Pulmonary/Chest: Effort normal. Right breast exhibits no mass, no nipple discharge, no skin change and no tenderness. Left breast exhibits no mass, no nipple discharge, no skin change and no tenderness. Breasts are symmetrical.        Abdominal: Soft.     Genitourinary:    Vagina, uterus, right adnexa, left adnexa and rectum normal.      Pelvic exam was performed with patient supine.   Cervix is  normal. There is a normal right adnexa and a normal left adnexa. Right adnexum displays no mass and no tenderness. Left adnexum displays no mass and no tenderness. No erythema, bleeding, rectocele, cystocele or unspecified prolapse of vaginal walls in the vagina. Labial bartholins normal.Additional cervical findings: pap smear donenegative for vaginal discharge       Uterus Size: 8 cm   Musculoskeletal: Normal range of motion.       Neurological: She is alert.    Skin: Skin is warm.    Psychiatric: She has a normal mood and affect.           Assessment:      Encounter Diagnoses   Name Primary?    Encounter for other general counseling or advice on contraception     Screening for cervical cancer Yes    Menorrhagia with regular cycle     Yeast vaginitis              Plan:      Continue annual well woman exam.  Pap today; Reviewed updated recommendations for pap smears (every 3 years) in low risk patients.   Recommend annual pelvic exams.  Reviewed recommendations for annual CBE.  mammo scheduled, continue yearly until age 74  Continue menstrual calendar  Continue diet, exercise, weight loss  Reviewed options for menorrhagia--ocp, 3 mo ocp, depo, mirena, hysterectomy  Pt feels she is ready to proceed with hysterectomy--christiano info placed on avs; procedure reviewed  Case request submitted for christiano

## 2020-10-01 NOTE — PATIENT INSTRUCTIONS

## 2020-10-01 NOTE — PROGRESS NOTES
PROCEDURE: Elliptical excision with intermediate layered repair    ANESTHETIC: 6 cc 2% Lidocaine with Epinephrine 1:100,000    SURGICAL PREP: Betadine and EtOH    SURGEON: Victor Manuel Cox MD    ASSISTANTS:  Carin Estrada LPN    PREOPERATIVE DIAGNOSIS: Subcutaneous nodule    POSTOPERATIVE DIAGNOSIS: Subcutaneous nodule    PATHOLOGIC DIAGNOSIS: Pending    LOCATION: back    INITIAL LESION SIZE: 1.5 cm    EXCISED DIAMETER: 1.5 cm    PREPARATION:  The diagnosis, procedure, alternatives, benefits and risks, including but not limited to: drug reactions, pain, scar or cosmetic defect, local sensation disturbances, and/or recurrence of present condition were explained to the patient. The patient elected to proceed.    PROCEDURE:  The area of the back was prepped, draped, and anesthetized in the usual sterile fashion. Lesional tissue was carefully marked prior to administration of the anesthesia. An elliptical excision was drawn around the lesion. A fusiform elliptical excision was done with a #15 blade carried down completely through the dermis into the subcutaneous tissues. Then, with a combination of blunt and sharp dissection, the lesion was removed.  The specimen was submitted for histologic evaluation. The operative site was widely undermined in the subcutaneous tissue plane. Blood loss was minimal. The wound was then approximated in a layered fashion with subcutaneous and intradermal 3-0 Vicryl sutures. The wound was then superficially closed with running 3-0 Ethilon sutures.    The patient tolerated the procedure well.    The area was cleaned and dressed appropriately and the patient was given wound care instructions, as well as an appointment for follow-up evaluation.    LENGTH OF REPAIR: 1.5 cm    There were no vitals filed for this visit.    No follow-ups on file.

## 2020-10-01 NOTE — PATIENT INSTRUCTIONS
Problems Laparoscopic Hysterectomy Can Treat  Some health problems can cause pain or bleeding in the uterus. These problems can sometimes be helped by medicine. Or surgery may be done that keeps the uterus intact. But sometimes, the best way to relieve pain and bleeding is to remove the uterus using laparoscopic hysterectomy. Its done using small incisions.       The following are conditions hysterectomy can treat:  · Fibroids. A fibroid is a lump of muscle tissue. It grows in or on the wall of the uterus. It is not cancer. A fibroid can cause pain and heavy bleeding. It may press on the bladder or rectum. This can lead to frequent urination, constipation, and other problems.  · Endometriosis. Endometriosis is the growth of the uterine lining outside of the uterus. This tissue can lead to scarring (adhesions). This scarring occurs inside the pelvic cavity. This can cause severe pain.  · Gynecological cancer. Ovarian, fallopian tube, uterine and cervical cancers can be treated. Many times additional tissues are removed when cancer is involved.  · Endometrial hyperplasia. This is when the endometrium has abnormal cell changes that can precede cancer of the endometrium.  · Cervical dysplasia. This is a change in the cervix that precedes certical cancer.  · Uterine prolapse. This is when the uterus drops from the normal location  Other Problems  Abnormal bleeding may be due to other causes. These include:  · Adenomyosis. This is the growth of the endometrium into the uterine muscle.  · Uterine polyps. These are fleshy growths of tissue from the uterine wall.  Date Last Reviewed: 3/1/2017  © 6838-8904 [employer]. [address] [place] [address], [place] [address]. All rights reserved. This information is not intended as a substitute for professional medical care. Always follow your healthcare professional's instructions.

## 2020-10-02 ENCOUNTER — OFFICE VISIT (OUTPATIENT)
Dept: ALLERGY | Facility: CLINIC | Age: 40
End: 2020-10-02
Payer: OTHER GOVERNMENT

## 2020-10-02 ENCOUNTER — LAB VISIT (OUTPATIENT)
Dept: LAB | Facility: HOSPITAL | Age: 40
End: 2020-10-02
Attending: ALLERGY & IMMUNOLOGY
Payer: OTHER GOVERNMENT

## 2020-10-02 ENCOUNTER — OFFICE VISIT (OUTPATIENT)
Dept: OPHTHALMOLOGY | Facility: CLINIC | Age: 40
End: 2020-10-02
Payer: OTHER GOVERNMENT

## 2020-10-02 VITALS
HEART RATE: 66 BPM | WEIGHT: 184.94 LBS | BODY MASS INDEX: 29.03 KG/M2 | SYSTOLIC BLOOD PRESSURE: 111 MMHG | TEMPERATURE: 98 F | DIASTOLIC BLOOD PRESSURE: 67 MMHG | HEIGHT: 67 IN

## 2020-10-02 DIAGNOSIS — H52.03 HYPEROPIA, BILATERAL: ICD-10-CM

## 2020-10-02 DIAGNOSIS — J30.1 SEASONAL ALLERGIC RHINITIS DUE TO POLLEN: Primary | ICD-10-CM

## 2020-10-02 DIAGNOSIS — H10.13 ALLERGIC CONJUNCTIVITIS, BILATERAL: Primary | ICD-10-CM

## 2020-10-02 DIAGNOSIS — G51.4 EYELID MYOKYMIA: ICD-10-CM

## 2020-10-02 DIAGNOSIS — J30.1 SEASONAL ALLERGIC RHINITIS DUE TO POLLEN: ICD-10-CM

## 2020-10-02 LAB — IGE SERPL-ACNC: <35 IU/ML (ref 0–100)

## 2020-10-02 PROCEDURE — 92015 DETERMINE REFRACTIVE STATE: CPT | Mod: ,,, | Performed by: OPTOMETRIST

## 2020-10-02 PROCEDURE — 99204 PR OFFICE/OUTPT VISIT, NEW, LEVL IV, 45-59 MIN: ICD-10-PCS | Mod: S$PBB,,, | Performed by: ALLERGY & IMMUNOLOGY

## 2020-10-02 PROCEDURE — 99999 PR PBB SHADOW E&M-EST. PATIENT-LVL III: CPT | Mod: PBBFAC,,, | Performed by: ALLERGY & IMMUNOLOGY

## 2020-10-02 PROCEDURE — 99999 PR PBB SHADOW E&M-EST. PATIENT-LVL III: ICD-10-PCS | Mod: PBBFAC,,, | Performed by: OPTOMETRIST

## 2020-10-02 PROCEDURE — 99999 PR PBB SHADOW E&M-EST. PATIENT-LVL III: ICD-10-PCS | Mod: PBBFAC,,, | Performed by: ALLERGY & IMMUNOLOGY

## 2020-10-02 PROCEDURE — 36415 COLL VENOUS BLD VENIPUNCTURE: CPT

## 2020-10-02 PROCEDURE — 86003 ALLG SPEC IGE CRUDE XTRC EA: CPT | Mod: 59

## 2020-10-02 PROCEDURE — 99213 OFFICE O/P EST LOW 20 MIN: CPT | Mod: PBBFAC | Performed by: ALLERGY & IMMUNOLOGY

## 2020-10-02 PROCEDURE — 92015 PR REFRACTION: ICD-10-PCS | Mod: ,,, | Performed by: OPTOMETRIST

## 2020-10-02 PROCEDURE — 92002 PR EYE EXAM, NEW PATIENT,INTERMED: ICD-10-PCS | Mod: S$PBB,,, | Performed by: OPTOMETRIST

## 2020-10-02 PROCEDURE — 99213 OFFICE O/P EST LOW 20 MIN: CPT | Mod: PBBFAC,27 | Performed by: OPTOMETRIST

## 2020-10-02 PROCEDURE — 99999 PR PBB SHADOW E&M-EST. PATIENT-LVL III: CPT | Mod: PBBFAC,,, | Performed by: OPTOMETRIST

## 2020-10-02 PROCEDURE — 82785 ASSAY OF IGE: CPT

## 2020-10-02 PROCEDURE — 86003 ALLG SPEC IGE CRUDE XTRC EA: CPT

## 2020-10-02 PROCEDURE — 99204 OFFICE O/P NEW MOD 45 MIN: CPT | Mod: S$PBB,,, | Performed by: ALLERGY & IMMUNOLOGY

## 2020-10-02 PROCEDURE — 92002 INTRM OPH EXAM NEW PATIENT: CPT | Mod: S$PBB,,, | Performed by: OPTOMETRIST

## 2020-10-02 RX ORDER — FLUTICASONE PROPIONATE 50 MCG
1 SPRAY, SUSPENSION (ML) NASAL DAILY
Qty: 20 ML | Refills: 5 | Status: SHIPPED | OUTPATIENT
Start: 2020-10-02 | End: 2022-09-01

## 2020-10-02 RX ORDER — PREDNISONE 20 MG/1
20 TABLET ORAL 2 TIMES DAILY
Qty: 10 TABLET | Refills: 0 | Status: SHIPPED | OUTPATIENT
Start: 2020-10-02 | End: 2020-12-11

## 2020-10-02 RX ORDER — AZELASTINE 1 MG/ML
1 SPRAY, METERED NASAL 2 TIMES DAILY
Qty: 20 ML | Refills: 5 | Status: SHIPPED | OUTPATIENT
Start: 2020-10-02 | End: 2022-09-01

## 2020-10-02 NOTE — PATIENT INSTRUCTIONS
Understanding Allergy Immunotherapy (Adult)  Allergy shots, or immunotherapy, are ways to treat allergies. The shots help to lessen your body's reaction to allergens or those things that cause you to have allergy symptoms.  People with the following conditions may have immunotherapy:  · Nasal allergies (allergic rhinitis)  · Inflammation of the lining of the eye caused by allergies (allergic conjunctivitis)  · Asthma, triggered by allergens  · Stinging insect allergy  Deciding to have immunotherapy     Talk with your provider about whether you should have allergy shots. Consider the following:  · The severity of your allergy symptoms  · How long the symptoms last. (Do you have symptoms all year, or just for a few periods during spring or fall?)  · How well your symptoms are controlled by taking medicines and avoiding triggers  · Whether you want to take allergy medicines long-term  · The time involved and cost of allergy shots  How immunotherapy works  You will get injections of increasing amounts of allergens that cause your allergy symptoms. Your body gradually gets used to the allergens. Eventually your body won't react to them and you won't have allergy symptoms. This works in some, but not all people.  Having immunotherapy  The shots are given in the upper arm. The shots are usually not painful, but you will feel a small pinch. After your shot, you may have some redness and swelling in your arm. You may also have sneezing, nasal congestion, or mild hives (itchy red, raised spots on the skin).  You may have 1 to 2 shots a week for 3 to 6 months. This is the buildup phase. Then you will have shots every 2 to 4 weeks, during the maintenance phase. This phase may take a few years. Shot schedules are not exactly the same for each person. Your healthcare provider will set your schedule. And you may actually start to feel better during the buildup phase, although it can take much longer.  Each time you get your  shots, you will wait in the office for about 30 minutes. This is to make sure you aren't having a severe allergic reaction, even though severe reactions are uncommon.  While youre being treated  Immunotherapy is only part of the treatment plan for people with allergies. Since it takes time to work, you will need to keep taking allergy medicines as advised by your healthcare provider. It is also important to try to avoid your allergens.  When to call your healthcare provider  Call your healthcare provider if you have severe symptoms such as:  · Wheezing or chest tightness  · Dizziness  · Nausea   Date Last Reviewed: 1/1/2017  © 2309-2185 [employer]. [address] [place] [address], [place] [address]. All rights reserved. This information is not intended as a substitute for professional medical care. Always follow your healthcare professional's instructions.

## 2020-10-02 NOTE — PROGRESS NOTES
HPI     Eye Exam      Additional comments: yearly              Comments     New Patient  Last Exam 1 year ago  Patient states that she is noticing some visual changes both up close and   far away. She would also like to talk about getting a vein removed from   underneath her right eye.  No Pain  No Ocular Sx  Fhx of blindness & glaucoma (grandma)           Last edited by Carin Segal on 10/2/2020 10:28 AM. (History)            Assessment /Plan     For exam results, see Encounter Report.    Allergic conjunctivitis, bilateral    Eyelid myokymia    Hyperopia, bilateral      Pataday OTC    Discussed reducing stress and caffeine to reduce myokymia.    Dispense Final Rx for glasses for computer.  RTC 1 year  Discussed above and answered questions.

## 2020-10-02 NOTE — PROGRESS NOTES
"Subjective:       Patient ID: Brionna Carrion is a 39 y.o. female.    Initial Visit      Chief Complaint:  Allergies      HPI: 39 year old female complaining of "allergies"  "I have been here for 10 years". She reports allergic rhinitis symptoms for the last 10 years.  She reports nasal congestion, runny nose, dark circles under her eyes, itchy and watery eyes.  She takes Claritin, which does not help.  She reports that Benadryl helps, but causes sedation.  She takes Flonase intermittently.    "I have never had asthma before, but it is in question now".  She reports, that since she had Covid, during the Summer, she has had lower respiratory symptoms.  She uses an albuterol inhaler when she runs.  She denies needing albuterol beyond running.    She has not been allergy tested before.  She denies sinus surgery.    She denies food allergies.  She denies insect sting allergy.      Past Medical History:   Diagnosis Date    Abnormal Pap smear     HPV/ biopsy-- never had a normal pap    Allergy     year around; tried OTC Zyrtec, Allegra, which makes her sleepy; increased water has helped partially    Closed displaced fracture of proximal phalanx of lesser toe of left foot 8/7/2017    COVID-19 virus infection 7/21/2020    Diabetes mellitus     Family history of breast cancer in female     Early breast cancer on mother's side of the family    History of HPV infection 01/01/2002    Initially had abnormal PAPs, except for 2015.    Left ovarian cyst     2 cm per CT abn & Pelvis w/o at [facility] on 12/7/19     Family History   Problem Relation Age of Onset    Hypertension Mother     Breast cancer Maternal Grandmother     Hypertension Maternal Grandmother     Glaucoma Maternal Grandmother     Cataracts Maternal Grandmother     Breast cancer Maternal Aunt     Breast cancer Maternal Aunt     Breast cancer Maternal Aunt     Breast cancer Maternal Aunt     Ovarian cancer Neg Hx     Stroke Neg Hx      Current Outpatient " Medications on File Prior to Visit   Medication Sig Dispense Refill    amoxicillin (AMOXIL) 250 MG capsule Take 250 mg by mouth every 8 (eight) hours.      PROAIR HFA 90 mcg/actuation inhaler INHALE 2 PUFFS INTO THE LUNGS Q 4 HOURS PRN FOR SOB OR WHEEZING      fluconazole (DIFLUCAN) 200 MG Tab Take 1 tablet (200 mg total) by mouth once daily. for 3 doses (Patient not taking: Reported on 10/2/2020) 3 tablet 0     No current facility-administered medications on file prior to visit.      Review of patient's allergies indicates:  No Known Allergies    Environmental History: Dogs and a cat She is not a smoker.  Review of Systems   Constitutional: Negative for chills and fever.   HENT: Positive for congestion, rhinorrhea, sinus pressure and sinus pain.    Eyes: Positive for discharge and itching.   Respiratory: Negative for cough, shortness of breath and wheezing.    Cardiovascular: Negative for chest pain and leg swelling.   Gastrointestinal: Positive for nausea. Negative for vomiting.        Denies heartburn   Endocrine: Negative for cold intolerance and heat intolerance.   Skin: Negative for rash and wound.   Allergic/Immunologic: Positive for environmental allergies. Negative for food allergies and immunocompromised state.   Neurological: Negative for facial asymmetry and speech difficulty.   Hematological: Negative for adenopathy. Bruises/bleeds easily.   Psychiatric/Behavioral: Negative for behavioral problems and suicidal ideas.        Objective:    Physical Exam  Vitals signs reviewed.   Constitutional:       General: She is not in acute distress.     Appearance: Normal appearance. She is well-developed. She is not ill-appearing, toxic-appearing or diaphoretic.   HENT:      Head: Normocephalic and atraumatic.      Right Ear: Tympanic membrane, ear canal and external ear normal. There is no impacted cerumen.      Left Ear: Tympanic membrane, ear canal and external ear normal. There is no impacted cerumen.       Nose: Nose normal. No congestion or rhinorrhea.      Mouth/Throat:      Pharynx: No oropharyngeal exudate or posterior oropharyngeal erythema.   Eyes:      General: No scleral icterus.        Right eye: No discharge.         Left eye: No discharge.      Pupils: Pupils are equal, round, and reactive to light.   Neck:      Musculoskeletal: Normal range of motion and neck supple. No neck rigidity or muscular tenderness.      Thyroid: No thyromegaly.   Cardiovascular:      Rate and Rhythm: Normal rate and regular rhythm.      Heart sounds: Normal heart sounds. No murmur. No gallop.    Pulmonary:      Effort: Pulmonary effort is normal. No respiratory distress.      Breath sounds: Normal breath sounds. No stridor. No wheezing, rhonchi or rales.   Chest:      Chest wall: No tenderness.   Abdominal:      General: Bowel sounds are normal. There is no distension.      Palpations: Abdomen is soft. There is no mass.      Tenderness: There is no abdominal tenderness. There is no guarding.      Hernia: No hernia is present.   Musculoskeletal: Normal range of motion.      Right lower leg: No edema.      Left lower leg: No edema.   Lymphadenopathy:      Cervical: No cervical adenopathy.   Skin:     General: Skin is warm.      Coloration: Skin is not jaundiced or pale.      Findings: No bruising or erythema.   Neurological:      Mental Status: She is alert and oriented to person, place, and time.   Psychiatric:         Mood and Affect: Mood normal.         Behavior: Behavior normal.         Thought Content: Thought content normal.         Judgment: Judgment normal.           Assessment:       1. Seasonal allergic rhinitis due to pollen         Plan:       Seasonal allergic rhinitis due to pollen  -     ALLERGEN-ALTERNARIA ALTERNATA; Future; Expected date: 10/02/2020  -     IgE; Future; Expected date: 10/02/2020  -     Bahia grass IgE; Future; Expected date: 10/02/2020  -     Aspergillus fumagatus IgE; Future; Expected date:  10/02/2020  -     Chaetomium globosum IgE; Future; Expected date: 10/02/2020  -     Cockroach, American IgE; Future; Expected date: 10/02/2020  -     Cladosporium IgE; Future; Expected date: 10/02/2020  -     Curvularia lunata IgE; Future; Expected date: 10/02/2020  -     D. farinae IgE; Future; Expected date: 10/02/2020  -     D. pteronyssinus IgE; Future; Expected date: 10/02/2020  -     Dog dander IgE; Future; Expected date: 10/02/2020  -     Plantain, English IgE; Future; Expected date: 10/02/2020  -     Eucalyptus IgE; Future; Expected date: 10/02/2020  -     Marsh elder, rough IgE; Future; Expected date: 10/02/2020  -     Mugwort IgE; Future; Expected date: 10/02/2020  -     Nettle IgE; Future; Expected date: 10/02/2020  -     Orchard grass IgE; Future; Expected date: 10/02/2020  -     Geary, western white IgE; Future; Expected date: 10/02/2020  -     Ragweed, short, common IgE; Future; Expected date: 10/02/2020  -     Red top grass IgE; Future; Expected date: 10/02/2020  -     Rye grass, cultivated IgE; Future; Expected date: 10/02/2020  -     Thistle, Russian IgE; Future; Expected date: 10/02/2020  -     Stemphyllium IgE; Future; Expected date: 10/02/2020  -     Christopher IgE; Future; Expected date: 10/02/2020  -     Philipp grass IgE; Future; Expected date: 10/02/2020  -     Allergen, Pecan Tree IgE; Future; Expected date: 10/02/2020  -     [place], black IgE; Future; Expected date: 10/02/2020  -     Cisco, bald IgE; Future; Expected date: 10/02/2020  -     Oak, white IgE; Future; Expected date: 10/02/2020  -     Allergen, Cocklebur; Future; Expected date: 10/02/2020  -     Cat epithelium IgE; Future; Expected date: 10/02/2020  -     Allergen, Hackberry Celtis; Future; Expected date: 10/02/2020  -     Allergen, Elm Cedar; Future; Expected date: 10/02/2020  -     Allergen-Ellis; Future; Expected date: 10/02/2020  -     azelastine (ASTELIN) 137 mcg (0.1 %) nasal spray; 1 spray (137 mcg total) by Nasal  route 2 (two) times daily.  Dispense: 20 mL; Refill: 5  -     fluticasone propionate (FLONASE) 50 mcg/actuation nasal spray; 1 spray (50 mcg total) by Each Nostril route once daily.  Dispense: 20 mL; Refill: 5  -     predniSONE (DELTASONE) 20 MG tablet; Take 1 tablet (20 mg total) by mouth 2 (two) times daily.  Dispense: 10 tablet; Refill: 0    Discussed allergy immunotherapy.  Risk and benefits of medications explained.  RTC 2 weeks or sooner, if needed.  MD,KEM

## 2020-10-05 LAB
A ALTERNATA IGE QN: <0.1 KU/L
A FUMIGATUS IGE QN: <0.1 KU/L
ALLERGEN CHAETOMIUM GLOBOSUM IGE: <0.1 KU/L
ALLERGEN WHITE PINE TREE IGE: <0.1 KU/L
BAHIA GRASS IGE QN: <0.1 KU/L
BALD CYPRESS IGE QN: <0.1 KU/L
C HERBARUM IGE QN: <0.1 KU/L
C LUNATA IGE QN: <0.1 KU/L
CAT DANDER IGE QN: <0.1 KU/L
CHAETOMIUM GLOB. CLASS: NORMAL
COCKLEBUR IGE QN: <0.1 KU/L
COCKSFOOT IGE QN: 0.14 KU/L
COMMON RAGWEED IGE QN: 0.11 KU/L
COTTONWOOD IGE QN: <0.1 KU/L
D FARINAE IGE QN: 3.24 KU/L
D PTERONYSS IGE QN: 2.29 KU/L
DEPRECATED A ALTERNATA IGE RAST QL: NORMAL
DEPRECATED A FUMIGATUS IGE RAST QL: NORMAL
DEPRECATED BAHIA GRASS IGE RAST QL: NORMAL
DEPRECATED BALD CYPRESS IGE RAST QL: NORMAL
DEPRECATED C HERBARUM IGE RAST QL: NORMAL
DEPRECATED C LUNATA IGE RAST QL: NORMAL
DEPRECATED CAT DANDER IGE RAST QL: NORMAL
DEPRECATED COCKLEBUR IGE RAST QL: NORMAL
DEPRECATED COCKSFOOT IGE RAST QL: ABNORMAL
DEPRECATED COMMON RAGWEED IGE RAST QL: ABNORMAL
DEPRECATED COTTONWOOD IGE RAST QL: NORMAL
DEPRECATED D FARINAE IGE RAST QL: ABNORMAL
DEPRECATED D PTERONYSS IGE RAST QL: ABNORMAL
DEPRECATED DOG DANDER IGE RAST QL: NORMAL
DEPRECATED ELDER IGE RAST QL: NORMAL
DEPRECATED ENGL PLANTAIN IGE RAST QL: NORMAL
DEPRECATED GUM-TREE IGE RAST QL: NORMAL
DEPRECATED HACKBERRY TREE IGE RAST QL: NORMAL
DEPRECATED JOHNSON GRASS IGE RAST QL: NORMAL
DEPRECATED MUGWORT IGE RAST QL: NORMAL
DEPRECATED NETTLE IGE RAST QL: NORMAL
DEPRECATED PECAN/HICK TREE IGE RAST QL: NORMAL
DEPRECATED PER RYE GRASS IGE RAST QL: ABNORMAL
DEPRECATED RED TOP GRASS IGE RAST QL: ABNORMAL
DEPRECATED ROACH IGE RAST QL: NORMAL
DEPRECATED SALTWORT IGE RAST QL: NORMAL
DEPRECATED TIMOTHY IGE RAST QL: ABNORMAL
DEPRECATED WHITE OAK IGE RAST QL: NORMAL
DEPRECATED WILLOW IGE RAST QL: NORMAL
DOG DANDER IGE QN: <0.1 KU/L
ELDER IGE QN: <0.1 KU/L
ELM CEDAR CLASS: NORMAL
ELM CEDAR, IGE: <0.1 KU/L
ENGL PLANTAIN IGE QN: 0.1 KU/L
GUM-TREE IGE QN: <0.1 KU/L
HACKBERRY CELTIS, IGE: <0.1 KU/L
JOHNSON GRASS IGE QN: <0.1 KU/L
MUGWORT IGE QN: <0.1 KU/L
NETTLE IGE QN: <0.1 KU/L
PECAN/HICK TREE IGE QN: <0.1 KU/L
PER RYE GRASS IGE QN: 0.13 KU/L
RED TOP GRASS IGE QN: 0.18 KU/L
ROACH IGE QN: <0.1 KU/L
SALTWORT IGE QN: <0.1 KU/L
STEMPHYLIUM HERBARUM CLASS: NORMAL
STEMPHYLLIUM, IGE: <0.1 KU/L
TIMOTHY IGE QN: 0.14 KU/L
WHITE OAK IGE QN: <0.1 KU/L
WHITE PINE CLASS: NORMAL
WILLOW IGE QN: 0.1 KU/L

## 2020-10-06 LAB
FINAL PATHOLOGIC DIAGNOSIS: NORMAL
GROSS: NORMAL

## 2020-10-08 ENCOUNTER — PATIENT MESSAGE (OUTPATIENT)
Dept: ALLERGY | Facility: CLINIC | Age: 40
End: 2020-10-08

## 2020-10-12 ENCOUNTER — PATIENT MESSAGE (OUTPATIENT)
Dept: INTERNAL MEDICINE | Facility: CLINIC | Age: 40
End: 2020-10-12

## 2020-10-12 ENCOUNTER — OFFICE VISIT (OUTPATIENT)
Dept: INTERNAL MEDICINE | Facility: CLINIC | Age: 40
End: 2020-10-12
Payer: OTHER GOVERNMENT

## 2020-10-12 DIAGNOSIS — R06.00 DYSPNEA, UNSPECIFIED TYPE: ICD-10-CM

## 2020-10-12 DIAGNOSIS — Z86.16 HISTORY OF 2019 NOVEL CORONAVIRUS DISEASE (COVID-19): Primary | ICD-10-CM

## 2020-10-12 PROCEDURE — 99213 PR OFFICE/OUTPT VISIT, EST, LEVL III, 20-29 MIN: ICD-10-PCS | Mod: 95,,, | Performed by: FAMILY MEDICINE

## 2020-10-12 PROCEDURE — 99213 OFFICE O/P EST LOW 20 MIN: CPT | Mod: 95,,, | Performed by: FAMILY MEDICINE

## 2020-10-12 NOTE — ASSESSMENT & PLAN NOTE
Continues to have challenges although is improving. Provided with excuse stating that she will need additional time. Recommended seeing cardiology to be thorough. May benefit from EKG or echo/PFTs

## 2020-10-12 NOTE — PROGRESS NOTES
Subjective:       Patient ID: Brionna Carrion is a 39 y.o. female.    The patient location is: [place]  The chief complaint leading to consultation is: Shortness of Breath    Visit type: audiovisual  Total time spent with patient: 8 min  Each patient to whom he or she provides medical services by telemedicine is:  (1) informed of the relationship between the physician and patient and the respective role of any other health care provider with respect to management of the patient; and (2) notified that he or she may decline to receive medical services by telemedicine and may withdraw from such care at any time.    HPI  Brionna following up today with continued symptoms of shortness of breath and inability to push herself on runs as she would like to.  She is in the  and is still having hard time regaining her endurance from pre COVID levels.  She admits that has improved but she is not at the level that she needs to be to pass some of her physical assessments coming up.  Has not been a pulmonology nor cardiology.  Not having any chest pain associated with any exertion.    Family History   Problem Relation Age of Onset    Hypertension Mother     Breast cancer Maternal Grandmother     Hypertension Maternal Grandmother     Glaucoma Maternal Grandmother     Cataracts Maternal Grandmother     Breast cancer Maternal Aunt     Breast cancer Maternal Aunt     Breast cancer Maternal Aunt     Breast cancer Maternal Aunt     Ovarian cancer Neg Hx     Stroke Neg Hx        Current Outpatient Medications:     amoxicillin (AMOXIL) 250 MG capsule, Take 250 mg by mouth every 8 (eight) hours., Disp: , Rfl:     azelastine (ASTELIN) 137 mcg (0.1 %) nasal spray, 1 spray (137 mcg total) by Nasal route 2 (two) times daily., Disp: 20 mL, Rfl: 5    fluticasone propionate (FLONASE) 50 mcg/actuation nasal spray, 1 spray (50 mcg total) by Each Nostril route once daily., Disp: 20 mL, Rfl: 5    predniSONE (DELTASONE) 20 MG tablet, Take 1  tablet (20 mg total) by mouth 2 (two) times daily., Disp: 10 tablet, Rfl: 0    PROAIR HFA 90 mcg/actuation inhaler, INHALE 2 PUFFS INTO THE LUNGS Q 4 HOURS PRN FOR SOB OR WHEEZING, Disp: , Rfl:     Review of Systems   Constitutional: Negative for fever.   HENT: Negative for ear pain, rhinorrhea and sore throat.    Respiratory: Positive for shortness of breath and wheezing.    Cardiovascular: Negative for leg swelling.   Gastrointestinal: Negative for abdominal pain and vomiting.   Musculoskeletal: Negative for neck pain.   Skin: Negative for rash.   Neurological: Negative for headaches.       Objective:   There were no vitals taken for this visit.       Physical Exam  Constitutional:       General: She is not in acute distress.     Appearance: She is well-developed.   HENT:      Head: Normocephalic.   Pulmonary:      Effort: Pulmonary effort is normal. No respiratory distress.   Neurological:      Mental Status: She is alert and oriented to person, place, and time.   Psychiatric:         Behavior: Behavior normal.         Assessment & Plan     Problem List Items Addressed This Visit        Other    History of 2019 novel coronavirus disease (COVID-19) - Primary    Relevant Orders    Ambulatory referral/consult to Cardiology    Dyspnea    Current Assessment & Plan     Continues to have challenges although is improving. Provided with excuse stating that she will need additional time. Recommended seeing cardiology to be thorough. May benefit from EKG or echo/PFTs         Relevant Orders    Ambulatory referral/consult to Cardiology            No follow-ups on file.    Disclaimer:  This note may have been prepared using voice recognition software, it may have not been extensively proofed, as such there could be errors within the text such as sound alike errors.

## 2020-10-12 NOTE — LETTER
October 12, 2020    Brionna Carrion  [address] [address]         [facility]  [address]  [facility] [address]  Phone: [phone]  Fax: [phone] October 12, 2020     Patient: Brionna Carrion   YOB: 1980   Date of Visit: 10/12/2020       To Whom It May Concern:    It is my medical opinion that Brionna Carrion is continuing to experience respiratory challenges from previous COVID-19 infection. It has been seen in numerous case reports that some patients experience significant decline in lung/cardiac function that takes time to return. She is improving but will likely need a few more months to work at getting endurance back to pre-COVID levels. Allow her to run at her own pace and safely push herself as she recovers. Will re-evaluate in 30 days.    If you have any questions or concerns, please don't hesitate to call.    Sincerely,        Kareem Dubon, DO

## 2020-10-13 ENCOUNTER — TELEPHONE (OUTPATIENT)
Dept: OBSTETRICS AND GYNECOLOGY | Facility: CLINIC | Age: 40
End: 2020-10-13

## 2020-10-13 NOTE — TELEPHONE ENCOUNTER
----- Message from Corinna Shepard MD sent at 10/1/2020  4:15 PM CDT -----  Cherelles christiano louie for menorrhagia

## 2020-10-13 NOTE — TELEPHONE ENCOUNTER
Multiple messages left for patient to call back to schedule surgery with no return phone call.  Will wait for patient to contact office.

## 2020-10-15 LAB
HPV HR 12 DNA SPEC QL NAA+PROBE: NEGATIVE
HPV16 AG SPEC QL: NEGATIVE
HPV18 DNA SPEC QL NAA+PROBE: NEGATIVE

## 2020-10-19 ENCOUNTER — TELEPHONE (OUTPATIENT)
Dept: CARDIOLOGY | Facility: CLINIC | Age: 40
End: 2020-10-19

## 2020-10-19 DIAGNOSIS — Z76.89 ESTABLISHING CARE WITH NEW DOCTOR, ENCOUNTER FOR: Primary | ICD-10-CM

## 2020-10-23 LAB
FINAL PATHOLOGIC DIAGNOSIS: NORMAL
Lab: NORMAL

## 2020-12-03 ENCOUNTER — TELEPHONE (OUTPATIENT)
Dept: INTERNAL MEDICINE | Facility: CLINIC | Age: 40
End: 2020-12-03

## 2020-12-03 ENCOUNTER — OFFICE VISIT (OUTPATIENT)
Dept: INTERNAL MEDICINE | Facility: CLINIC | Age: 40
End: 2020-12-03
Payer: OTHER GOVERNMENT

## 2020-12-03 ENCOUNTER — PATIENT MESSAGE (OUTPATIENT)
Dept: INTERNAL MEDICINE | Facility: CLINIC | Age: 40
End: 2020-12-03

## 2020-12-03 DIAGNOSIS — R51.9 HEAD ACHE: ICD-10-CM

## 2020-12-03 DIAGNOSIS — M79.10 MUSCLE PAIN: ICD-10-CM

## 2020-12-03 DIAGNOSIS — R50.9 FEVER, UNSPECIFIED FEVER CAUSE: Primary | ICD-10-CM

## 2020-12-03 DIAGNOSIS — R11.0 NAUSEA: ICD-10-CM

## 2020-12-03 DIAGNOSIS — R50.9 FEVER: ICD-10-CM

## 2020-12-03 LAB
CTP QC/QA: YES
CTP QC/QA: YES
POC MOLECULAR INFLUENZA A AGN: NEGATIVE
POC MOLECULAR INFLUENZA B AGN: NEGATIVE
S PYO RRNA THROAT QL PROBE: NEGATIVE

## 2020-12-03 PROCEDURE — 99213 PR OFFICE/OUTPT VISIT, EST, LEVL III, 20-29 MIN: ICD-10-PCS | Mod: 95,,, | Performed by: FAMILY MEDICINE

## 2020-12-03 PROCEDURE — 87502 INFLUENZA DNA AMP PROBE: CPT | Mod: QW,,, | Performed by: FAMILY MEDICINE

## 2020-12-03 PROCEDURE — 99213 OFFICE O/P EST LOW 20 MIN: CPT | Mod: 95,,, | Performed by: FAMILY MEDICINE

## 2020-12-03 PROCEDURE — 87502 POCT INFLUENZA A/B MOLECULAR: ICD-10-PCS | Mod: QW,,, | Performed by: FAMILY MEDICINE

## 2020-12-03 PROCEDURE — 87880 STREP A ASSAY W/OPTIC: CPT | Mod: QW,,, | Performed by: FAMILY MEDICINE

## 2020-12-03 PROCEDURE — 87880 POCT RAPID STREP A: ICD-10-PCS | Mod: QW,,, | Performed by: FAMILY MEDICINE

## 2020-12-03 PROCEDURE — U0003 INFECTIOUS AGENT DETECTION BY NUCLEIC ACID (DNA OR RNA); SEVERE ACUTE RESPIRATORY SYNDROME CORONAVIRUS 2 (SARS-COV-2) (CORONAVIRUS DISEASE [COVID-19]), AMPLIFIED PROBE TECHNIQUE, MAKING USE OF HIGH THROUGHPUT TECHNOLOGIES AS DESCRIBED BY CMS-2020-01-R: HCPCS

## 2020-12-03 PROCEDURE — 87081 CULTURE SCREEN ONLY: CPT

## 2020-12-03 RX ORDER — HYDROCODONE POLISTIREX AND CHLORPHENIRAMINE POLISTIREX 10; 8 MG/5ML; MG/5ML
5 SUSPENSION, EXTENDED RELEASE ORAL EVERY 12 HOURS PRN
Qty: 100 ML | Refills: 0 | Status: SHIPPED | OUTPATIENT
Start: 2020-12-03 | End: 2021-03-01

## 2020-12-03 RX ORDER — ONDANSETRON HYDROCHLORIDE 8 MG/1
8 TABLET, FILM COATED ORAL EVERY 8 HOURS PRN
Qty: 20 TABLET | Refills: 2 | Status: SHIPPED | OUTPATIENT
Start: 2020-12-03 | End: 2021-03-01

## 2020-12-03 NOTE — TELEPHONE ENCOUNTER
----- Message from Christiano Ferrara MD sent at 12/3/2020  1:04 PM CST -----  Please notify both were negative.

## 2020-12-04 ENCOUNTER — PATIENT MESSAGE (OUTPATIENT)
Dept: INTERNAL MEDICINE | Facility: CLINIC | Age: 40
End: 2020-12-04

## 2020-12-05 ENCOUNTER — PATIENT MESSAGE (OUTPATIENT)
Dept: INTERNAL MEDICINE | Facility: CLINIC | Age: 40
End: 2020-12-05

## 2020-12-05 LAB
BACTERIA THROAT CULT: NORMAL
SARS-COV-2 RNA RESP QL NAA+PROBE: DETECTED

## 2020-12-06 NOTE — PROGRESS NOTES
The patient location is: her parked vehicle  The chief complaint leading to consultation is: fever    Visit type: audiovisual    Face to Face time with patient: 8 minutes  10 up at minutes of total time spent on the encounter, which includes face to face time and non-face to face time preparing to see the patient (eg, review of tests), Obtaining and/or reviewing separately obtained history, Documenting clinical information in the electronic or other health record, Independently interpreting results (not separately reported) and communicating results to the patient/family/caregiver, or Care coordination (not separately reported).         Each patient to whom he or she provides medical services by telemedicine is:  (1) informed of the relationship between the physician and patient and the respective role of any other health care provider with respect to management of the patient; and (2) notified that he or she may decline to receive medical services by telemedicine and may withdraw from such care at any time.    Notes:       Subjective:      Patient ID: Brionna Carrion is a 39 y.o. female.    Chief Complaint: fever, body aches    Patient reports yesterday she developed a sore throat which has continued to worsen.  Now also having upper respiratory congestion nausea and vomiting x1, generalized body aches.  She has had fever with a T-max of 102.  She was diagnosed with COVID in early summer, has had lingering decreased exercise capacity since that time.    Review of Systems   Constitutional: Positive for activity change, appetite change, fatigue and fever.   HENT: Positive for congestion, rhinorrhea and sore throat. Negative for sinus pressure.    Respiratory: Positive for cough. Negative for shortness of breath and wheezing.    Gastrointestinal: Positive for nausea and vomiting. Negative for abdominal pain.   Musculoskeletal: Positive for myalgias.   Skin: Negative for rash.     Past Medical History:   Diagnosis Date     Abnormal Pap smear     HPV/ biopsy-- never had a normal pap    Allergy     year around; tried OTC Zyrtec, Allegra, which makes her sleepy; increased water has helped partially    Closed displaced fracture of proximal phalanx of lesser toe of left foot 8/7/2017    COVID-19 virus infection 7/21/2020    Diabetes mellitus     Family history of breast cancer in female     Early breast cancer on mother's side of the family    History of HPV infection 01/01/2002    Initially had abnormal PAPs, except for 2015.    Left ovarian cyst     2 cm per CT abn & Pelvis w/o at [facility] on 12/7/19          Past Surgical History:   Procedure Laterality Date    APPENDECTOMY      BREAST RECONSTRUCTION      augmentation    TONSILLECTOMY       Family History   Problem Relation Age of Onset    Hypertension Mother     Breast cancer Maternal Grandmother     Hypertension Maternal Grandmother     Glaucoma Maternal Grandmother     Cataracts Maternal Grandmother     Breast cancer Maternal Aunt     Breast cancer Maternal Aunt     Breast cancer Maternal Aunt     Breast cancer Maternal Aunt     Ovarian cancer Neg Hx     Stroke Neg Hx      Social History     Socioeconomic History    Marital status:      Spouse name: Not on file    Number of children: Not on file    Years of education: Not on file    Highest education level: Not on file   Occupational History    Occupation: [employer]      Comment: [employer]   Social Needs    Financial resource strain: Not on file    Food insecurity     Worry: Not on file     Inability: Not on file    Transportation needs     Medical: Not on file     Non-medical: Not on file   Tobacco Use    Smoking status: Never Smoker   Substance and Sexual Activity    Alcohol use: Yes     Comment: 2 drinks every 2 weeks    Drug use: No    Sexual activity: Yes     Partners: Male     Comment:  visectomy   Lifestyle    Physical activity     Days per week: Not on file     Minutes per  "session: Not on file    Stress: Not on file   Relationships    Social connections     Talks on phone: Not on file     Gets together: Not on file     Attends Nondenominational service: Not on file     Active member of club or organization: Not on file     Attends meetings of clubs or organizations: Not on file     Relationship status: Not on file   Other Topics Concern    Are you pregnant or think you may be? Not Asked    Breast-feeding Not Asked   Social History Narrative    Breakfast: Protein shake (grass fed & powder CLA), water, +5 hour energy drink throughout the day    Lunch: Meat: shrimp & 1/2 sweet potato; water     Dinner: Salad with black olives, olive oil, little salt; water    Snacks: None (no sodas or diet sodas)    Eats out: 1x/mo: [place]: grilled shrimp, sweet potato, part of a salad; water    PA: 45 min resistance 3x/wk, runs 5 mi 3-4 x/wk, yoga in the sauna "[employer]"    Water: 2 - 24 oz/d + 8 = 56 oz/d     Review of patient's allergies indicates:  No Known Allergies    Objective:       There were no vitals taken for this visit.  Physical Exam  Constitutional:       General: She is not in acute distress.     Appearance: Normal appearance. She is well-developed. She is not ill-appearing or diaphoretic.   Pulmonary:      Effort: No respiratory distress.   Neurological:      Mental Status: She is alert and oriented to person, place, and time.   Psychiatric:         Mood and Affect: Mood normal.         Behavior: Behavior normal.         Thought Content: Thought content normal.         Judgment: Judgment normal.       Assessment:     1. Fever, unspecified fever cause    2. Muscle pain    3. Head ache    4. Nausea    5. Fever      Plan:   Fever, unspecified fever cause  -     Strep A culture, throat  -     POCT Rapid Strep A  -     POCT Influenza A/B Molecular    Muscle pain  -     COVID-19 Routine Screening    Head ache  -     COVID-19 Routine Screening    Nausea  -     COVID-19 Routine " Screening    Fever  -     COVID-19 Routine Screening    Other orders  -     ondansetron (ZOFRAN) 8 MG tablet; Take 1 tablet (8 mg total) by mouth every 8 (eight) hours as needed for Nausea.  Dispense: 20 tablet; Refill: 2  -     hydrocodone-chlorpheniramine (TUSSIONEX PENNKINETIC ER) 10-8 mg/5 mL suspension; Take 5 mLs by mouth every 12 (twelve) hours as needed (throat pain).  Dispense: 100 mL; Refill: 0     Flu and rapid strep were negative here today  Medication List with Changes/Refills   New Medications    HYDROCODONE-CHLORPHENIRAMINE (TUSSIONEX PENNKINETIC ER) 10-8 MG/5 ML SUSPENSION    Take 5 mLs by mouth every 12 (twelve) hours as needed (throat pain).    ONDANSETRON (ZOFRAN) 8 MG TABLET    Take 1 tablet (8 mg total) by mouth every 8 (eight) hours as needed for Nausea.   Current Medications    AMOXICILLIN (AMOXIL) 250 MG CAPSULE    Take 250 mg by mouth every 8 (eight) hours.    AZELASTINE (ASTELIN) 137 MCG (0.1 %) NASAL SPRAY    1 spray (137 mcg total) by Nasal route 2 (two) times daily.    FLUTICASONE PROPIONATE (FLONASE) 50 MCG/ACTUATION NASAL SPRAY    1 spray (50 mcg total) by Each Nostril route once daily.    PREDNISONE (DELTASONE) 20 MG TABLET    Take 1 tablet (20 mg total) by mouth 2 (two) times daily.    PROAIR HFA 90 MCG/ACTUATION INHALER    INHALE 2 PUFFS INTO THE LUNGS Q 4 HOURS PRN FOR SOB OR WHEEZING

## 2020-12-11 RX ORDER — AMOXICILLIN AND CLAVULANATE POTASSIUM 875; 125 MG/1; MG/1
1 TABLET, FILM COATED ORAL EVERY 12 HOURS
Qty: 20 TABLET | Refills: 0 | Status: SHIPPED | OUTPATIENT
Start: 2020-12-11 | End: 2021-03-01

## 2020-12-11 NOTE — TELEPHONE ENCOUNTER
LOV: 12/03/2020 Dr. Ferrara    Pt requesting antibiotics for ear infection and something for pain in my throat. Please advise.       (Dr. Dubon is out of office will be working in the hospital for the rest of December)

## 2020-12-12 ENCOUNTER — PATIENT MESSAGE (OUTPATIENT)
Dept: INTERNAL MEDICINE | Facility: CLINIC | Age: 40
End: 2020-12-12

## 2020-12-21 ENCOUNTER — PATIENT MESSAGE (OUTPATIENT)
Dept: INTERNAL MEDICINE | Facility: CLINIC | Age: 40
End: 2020-12-21

## 2020-12-22 ENCOUNTER — PATIENT MESSAGE (OUTPATIENT)
Dept: INTERNAL MEDICINE | Facility: CLINIC | Age: 40
End: 2020-12-22

## 2020-12-22 ENCOUNTER — TELEPHONE (OUTPATIENT)
Dept: INTERNAL MEDICINE | Facility: CLINIC | Age: 40
End: 2020-12-22

## 2020-12-22 ENCOUNTER — OFFICE VISIT (OUTPATIENT)
Dept: INTERNAL MEDICINE | Facility: CLINIC | Age: 40
End: 2020-12-22
Payer: OTHER GOVERNMENT

## 2020-12-22 DIAGNOSIS — R07.89 CHEST PRESSURE: Primary | ICD-10-CM

## 2020-12-22 DIAGNOSIS — U07.1 COVID-19: ICD-10-CM

## 2020-12-22 PROCEDURE — 99213 PR OFFICE/OUTPT VISIT, EST, LEVL III, 20-29 MIN: ICD-10-PCS | Mod: 95,,, | Performed by: FAMILY MEDICINE

## 2020-12-22 PROCEDURE — 99213 OFFICE O/P EST LOW 20 MIN: CPT | Mod: 95,,, | Performed by: FAMILY MEDICINE

## 2020-12-22 RX ORDER — HYDROCODONE BITARTRATE AND ACETAMINOPHEN 5; 325 MG/1; MG/1
1 TABLET ORAL EVERY 6 HOURS PRN
Qty: 21 TABLET | Refills: 0 | Status: SHIPPED | OUTPATIENT
Start: 2020-12-22 | End: 2021-03-01

## 2020-12-22 NOTE — TELEPHONE ENCOUNTER
----- Message from Demetrius Russell sent at 12/22/2020 11:29 AM CST -----  Contact: self  Would like to consult with nurse regarding covid questions.  Pt states she would like to know if she is still contagious.  Please contact Brionna Carrion @ [phone].  Thanks/As

## 2020-12-23 ENCOUNTER — LAB VISIT (OUTPATIENT)
Dept: LAB | Facility: HOSPITAL | Age: 40
End: 2020-12-23
Attending: FAMILY MEDICINE
Payer: OTHER GOVERNMENT

## 2020-12-23 ENCOUNTER — APPOINTMENT (OUTPATIENT)
Dept: RADIOLOGY | Facility: HOSPITAL | Age: 40
End: 2020-12-23
Attending: FAMILY MEDICINE
Payer: OTHER GOVERNMENT

## 2020-12-23 DIAGNOSIS — R07.89 CHEST PRESSURE: ICD-10-CM

## 2020-12-23 DIAGNOSIS — U07.1 COVID-19: ICD-10-CM

## 2020-12-23 LAB
ALBUMIN SERPL BCP-MCNC: 4 G/DL (ref 3.5–5.2)
ALP SERPL-CCNC: 57 U/L (ref 55–135)
ALT SERPL W/O P-5'-P-CCNC: 21 U/L (ref 10–44)
ANION GAP SERPL CALC-SCNC: 6 MMOL/L (ref 8–16)
AST SERPL-CCNC: 20 U/L (ref 10–40)
BASOPHILS # BLD AUTO: 0.05 K/UL (ref 0–0.2)
BASOPHILS NFR BLD: 0.9 % (ref 0–1.9)
BILIRUB SERPL-MCNC: 0.6 MG/DL (ref 0.1–1)
BUN SERPL-MCNC: 9 MG/DL (ref 6–20)
CALCIUM SERPL-MCNC: 9.3 MG/DL (ref 8.7–10.5)
CHLORIDE SERPL-SCNC: 104 MMOL/L (ref 95–110)
CO2 SERPL-SCNC: 31 MMOL/L (ref 23–29)
CREAT SERPL-MCNC: 0.7 MG/DL (ref 0.5–1.4)
D DIMER PPP IA.FEU-MCNC: 0.48 MG/L FEU
DIFFERENTIAL METHOD: ABNORMAL
EOSINOPHIL # BLD AUTO: 0.1 K/UL (ref 0–0.5)
EOSINOPHIL NFR BLD: 1 % (ref 0–8)
ERYTHROCYTE [DISTWIDTH] IN BLOOD BY AUTOMATED COUNT: 13.7 % (ref 11.5–14.5)
EST. GFR  (AFRICAN AMERICAN): >60 ML/MIN/1.73 M^2
EST. GFR  (NON AFRICAN AMERICAN): >60 ML/MIN/1.73 M^2
GLUCOSE SERPL-MCNC: 93 MG/DL (ref 70–110)
HCT VFR BLD AUTO: 36.6 % (ref 37–48.5)
HGB BLD-MCNC: 11.9 G/DL (ref 12–16)
IMM GRANULOCYTES # BLD AUTO: 0.01 K/UL (ref 0–0.04)
IMM GRANULOCYTES NFR BLD AUTO: 0.2 % (ref 0–0.5)
LYMPHOCYTES # BLD AUTO: 2.6 K/UL (ref 1–4.8)
LYMPHOCYTES NFR BLD: 45.3 % (ref 18–48)
MCH RBC QN AUTO: 31.6 PG (ref 27–31)
MCHC RBC AUTO-ENTMCNC: 32.5 G/DL (ref 32–36)
MCV RBC AUTO: 97 FL (ref 82–98)
MONOCYTES # BLD AUTO: 0.4 K/UL (ref 0.3–1)
MONOCYTES NFR BLD: 6.9 % (ref 4–15)
NEUTROPHILS # BLD AUTO: 2.6 K/UL (ref 1.8–7.7)
NEUTROPHILS NFR BLD: 45.7 % (ref 38–73)
NRBC BLD-RTO: 0 /100 WBC
PLATELET # BLD AUTO: 250 K/UL (ref 150–350)
PMV BLD AUTO: 10.8 FL (ref 9.2–12.9)
POTASSIUM SERPL-SCNC: 4 MMOL/L (ref 3.5–5.1)
PROT SERPL-MCNC: 7.2 G/DL (ref 6–8.4)
RBC # BLD AUTO: 3.76 M/UL (ref 4–5.4)
SODIUM SERPL-SCNC: 141 MMOL/L (ref 136–145)
WBC # BLD AUTO: 5.76 K/UL (ref 3.9–12.7)

## 2020-12-23 PROCEDURE — 85025 COMPLETE CBC W/AUTO DIFF WBC: CPT

## 2020-12-23 PROCEDURE — 71046 XR CHEST PA AND LATERAL: ICD-10-PCS | Mod: 26,,, | Performed by: RADIOLOGY

## 2020-12-23 PROCEDURE — 71046 X-RAY EXAM CHEST 2 VIEWS: CPT | Mod: 26,,, | Performed by: RADIOLOGY

## 2020-12-23 PROCEDURE — 80053 COMPREHEN METABOLIC PANEL: CPT

## 2020-12-23 PROCEDURE — 36415 COLL VENOUS BLD VENIPUNCTURE: CPT | Mod: PO

## 2020-12-23 PROCEDURE — 71046 X-RAY EXAM CHEST 2 VIEWS: CPT | Mod: TC,PO

## 2020-12-23 PROCEDURE — 85379 FIBRIN DEGRADATION QUANT: CPT

## 2020-12-23 NOTE — PROGRESS NOTES
The patient location is: home  The chief complaint leading to consultation is: chest pressure, covid concerns    Visit type: audiovisual    Face to Face time with patient: 10 minutes  13 minutes of total time spent on the encounter, which includes face to face time and non-face to face time preparing to see the patient (eg, review of tests), Obtaining and/or reviewing separately obtained history, Documenting clinical information in the electronic or other health record, Independently interpreting results (not separately reported) and communicating results to the patient/family/caregiver, or Care coordination (not separately reported).         Each patient to whom he or she provides medical services by telemedicine is:  (1) informed of the relationship between the physician and patient and the respective role of any other health care provider with respect to management of the patient; and (2) notified that he or she may decline to receive medical services by telemedicine and may withdraw from such care at any time.    Notes:     Subjective:      Patient ID: Brionna Carrion is a 40 y.o. female.    Chief Complaint: No chief complaint on file.      Patient reports she had started to feel better after recent COVID diagnosis earlier this month however 4 days ago suddenly began to feel much worse.  She has severe sore throat, worse on the left side than the right.  She reports body aches, chills fever T-max 102° this week, nausea and vomiting again.  She also reports shortness of breath and pressure in her chest.  Was previously having severe pain in ear but this has resolved after taking antibiotic.    Sore Throat   This is a recurrent problem. The current episode started 1 to 4 weeks ago. The problem has been waxing and waning. The pain is worse on the left side. The maximum temperature recorded prior to her arrival was 101 - 101.9 F. The fever has been present for 1 to 2 days. The pain is at a severity of 7/10. The pain is  moderate. Associated symptoms include coughing, ear pain, headaches, shortness of breath and vomiting. Pertinent negatives include no abdominal pain, congestion or diarrhea. She has had no exposure to strep or mono. She has tried NSAIDs, acetaminophen and oral narcotic analgesics for the symptoms. The treatment provided mild relief.     Review of Systems   HENT: Positive for ear pain and sore throat. Negative for congestion.    Respiratory: Positive for cough and shortness of breath.    Gastrointestinal: Positive for vomiting. Negative for abdominal pain and diarrhea.   Neurological: Positive for headaches.     Past Medical History:   Diagnosis Date    Abnormal Pap smear     HPV/ biopsy-- never had a normal pap    Allergy     year around; tried OTC Zyrtec, Allegra, which makes her sleepy; increased water has helped partially    Closed displaced fracture of proximal phalanx of lesser toe of left foot 8/7/2017    COVID-19 virus infection 7/21/2020    Diabetes mellitus     Family history of breast cancer in female     Early breast cancer on mother's side of the family    History of HPV infection 01/01/2002    Initially had abnormal PAPs, except for 2015.    Left ovarian cyst     2 cm per CT abn & Pelvis w/o at [facility] on 12/7/19          Past Surgical History:   Procedure Laterality Date    APPENDECTOMY      BREAST RECONSTRUCTION      augmentation    TONSILLECTOMY       Family History   Problem Relation Age of Onset    Hypertension Mother     Breast cancer Maternal Grandmother     Hypertension Maternal Grandmother     Glaucoma Maternal Grandmother     Cataracts Maternal Grandmother     Breast cancer Maternal Aunt     Breast cancer Maternal Aunt     Breast cancer Maternal Aunt     Breast cancer Maternal Aunt     Ovarian cancer Neg Hx     Stroke Neg Hx      Social History     Socioeconomic History    Marital status:      Spouse name: Not on file    Number of children: Not on file    Years of  "education: Not on file    Highest education level: Not on file   Occupational History    Occupation: [employer]      Comment: [employer]   Social Needs    Financial resource strain: Not on file    Food insecurity     Worry: Not on file     Inability: Not on file    Transportation needs     Medical: Not on file     Non-medical: Not on file   Tobacco Use    Smoking status: Never Smoker   Substance and Sexual Activity    Alcohol use: Yes     Comment: 2 drinks every 2 weeks    Drug use: No    Sexual activity: Yes     Partners: Male     Comment:  visectomy   Lifestyle    Physical activity     Days per week: Not on file     Minutes per session: Not on file    Stress: Not on file   Relationships    Social connections     Talks on phone: Not on file     Gets together: Not on file     Attends Roman [employer] service: Not on file     Active member of club or organization: Not on file     Attends meetings of clubs or organizations: Not on file     Relationship status: Not on file   Other Topics Concern    Are you pregnant or think you may be? Not Asked    Breast-feeding Not Asked   Social History Narrative    Breakfast: Protein shake (grass fed & powder CLA), water, +5 hour energy drink throughout the day    Lunch: Meat: shrimp & 1/2 sweet potato; water     Dinner: Salad with black olives, olive oil, little salt; water    Snacks: None (no sodas or diet sodas)    Eats out: 1x/mo: [place]: grilled shrimp, sweet potato, part of a salad; water    PA: 45 min resistance 3x/wk, runs 5 mi 3-4 x/wk, yoga in the sauna "[employer]"    Water: 2 - 24 oz/d + 8 = 56 oz/d     Review of patient's allergies indicates:  No Known Allergies    Objective:       There were no vitals taken for this visit.  Physical Exam  Constitutional:       General: She is not in acute distress.     Appearance: Normal appearance. She is well-developed. She is not ill-appearing or diaphoretic.   Pulmonary:      Effort: No respiratory distress. "   Neurological:      Mental Status: She is alert and oriented to person, place, and time.   Psychiatric:         Mood and Affect: Mood normal.         Behavior: Behavior normal.         Thought Content: Thought content normal.         Judgment: Judgment normal.       Assessment:     1. Chest pressure    2. COVID-19      Plan:   Chest pressure  -     X-Ray Chest PA And Lateral; Future; Expected date: 12/22/2020  -     Comprehensive Metabolic Panel; Future; Expected date: 06/20/2021  -     CBC Auto Differential; Future; Expected date: 12/22/2020  -     D dimer, quantitative; Future; Expected date: 12/22/2020    COVID-19  -     X-Ray Chest PA And Lateral; Future; Expected date: 12/22/2020  -     Comprehensive Metabolic Panel; Future; Expected date: 06/20/2021  -     CBC Auto Differential; Future; Expected date: 12/22/2020  -     D dimer, quantitative; Future; Expected date: 12/22/2020    Other orders  -     HYDROcodone-acetaminophen (NORCO) 5-325 mg per tablet; Take 1 tablet by mouth every 6 (six) hours as needed for Pain.  Dispense: 21 tablet; Refill: 0      Medication List with Changes/Refills   New Medications    HYDROCODONE-ACETAMINOPHEN (NORCO) 5-325 MG PER TABLET    Take 1 tablet by mouth every 6 (six) hours as needed for Pain.   Current Medications    AMOXICILLIN-CLAVULANATE 875-125MG (AUGMENTIN) 875-125 MG PER TABLET    Take 1 tablet by mouth every 12 (twelve) hours.    AZELASTINE (ASTELIN) 137 MCG (0.1 %) NASAL SPRAY    1 spray (137 mcg total) by Nasal route 2 (two) times daily.    FLUTICASONE PROPIONATE (FLONASE) 50 MCG/ACTUATION NASAL SPRAY    1 spray (50 mcg total) by Each Nostril route once daily.    HYDROCODONE-CHLORPHENIRAMINE (TUSSIONEX PENNKINETIC ER) 10-8 MG/5 ML SUSPENSION    Take 5 mLs by mouth every 12 (twelve) hours as needed (throat pain).    NEOMYCIN-POLYMYXIN-HYDROCORTISONE (CORTISPORIN) 3.5-10,000-0.5 MG/G-UNIT/G-% CREAM    Apply topically 2 (two) times daily.    ONDANSETRON (ZOFRAN) 8 MG  TABLET    Take 1 tablet (8 mg total) by mouth every 8 (eight) hours as needed for Nausea.    PROAIR HFA 90 MCG/ACTUATION INHALER    INHALE 2 PUFFS INTO THE LUNGS Q 4 HOURS PRN FOR SOB OR WHEEZING

## 2020-12-24 ENCOUNTER — PATIENT MESSAGE (OUTPATIENT)
Dept: INTERNAL MEDICINE | Facility: CLINIC | Age: 40
End: 2020-12-24

## 2020-12-28 PROBLEM — Z00.00 ROUTINE HEALTH MAINTENANCE: Status: RESOLVED | Noted: 2020-09-22 | Resolved: 2020-12-28

## 2021-01-06 ENCOUNTER — PATIENT MESSAGE (OUTPATIENT)
Dept: PSYCHIATRY | Facility: CLINIC | Age: 41
End: 2021-01-06

## 2021-01-11 ENCOUNTER — TELEPHONE (OUTPATIENT)
Dept: PSYCHIATRY | Facility: CLINIC | Age: 41
End: 2021-01-11

## 2021-02-26 ENCOUNTER — PATIENT MESSAGE (OUTPATIENT)
Dept: INTERNAL MEDICINE | Facility: CLINIC | Age: 41
End: 2021-02-26

## 2021-02-26 ENCOUNTER — NURSE TRIAGE (OUTPATIENT)
Dept: ADMINISTRATIVE | Facility: CLINIC | Age: 41
End: 2021-02-26

## 2021-03-01 ENCOUNTER — OFFICE VISIT (OUTPATIENT)
Dept: INTERNAL MEDICINE | Facility: CLINIC | Age: 41
End: 2021-03-01
Payer: OTHER GOVERNMENT

## 2021-03-01 ENCOUNTER — APPOINTMENT (OUTPATIENT)
Dept: RADIOLOGY | Facility: HOSPITAL | Age: 41
End: 2021-03-01
Attending: FAMILY MEDICINE
Payer: OTHER GOVERNMENT

## 2021-03-01 VITALS
TEMPERATURE: 99 F | HEART RATE: 75 BPM | WEIGHT: 200.19 LBS | BODY MASS INDEX: 31.42 KG/M2 | SYSTOLIC BLOOD PRESSURE: 120 MMHG | OXYGEN SATURATION: 99 % | DIASTOLIC BLOOD PRESSURE: 68 MMHG | HEIGHT: 67 IN

## 2021-03-01 DIAGNOSIS — R07.89 CHEST PRESSURE: Primary | ICD-10-CM

## 2021-03-01 DIAGNOSIS — Z86.16 HISTORY OF 2019 NOVEL CORONAVIRUS DISEASE (COVID-19): ICD-10-CM

## 2021-03-01 DIAGNOSIS — M25.472 LEFT ANKLE SWELLING: ICD-10-CM

## 2021-03-01 DIAGNOSIS — R41.840 DIFFICULTY CONCENTRATING: ICD-10-CM

## 2021-03-01 PROCEDURE — 99214 OFFICE O/P EST MOD 30 MIN: CPT | Mod: 95,,, | Performed by: FAMILY MEDICINE

## 2021-03-01 PROCEDURE — 73610 X-RAY EXAM OF ANKLE: CPT | Mod: TC,PO,LT

## 2021-03-01 PROCEDURE — 73610 X-RAY EXAM OF ANKLE: CPT | Mod: 26,LT,, | Performed by: RADIOLOGY

## 2021-03-01 PROCEDURE — 73610 XR ANKLE COMPLETE 3 VIEW LEFT: ICD-10-PCS | Mod: 26,LT,, | Performed by: RADIOLOGY

## 2021-03-01 PROCEDURE — 99214 PR OFFICE/OUTPT VISIT, EST, LEVL IV, 30-39 MIN: ICD-10-PCS | Mod: 95,,, | Performed by: FAMILY MEDICINE

## 2021-03-03 ENCOUNTER — PATIENT MESSAGE (OUTPATIENT)
Dept: CARDIOLOGY | Facility: CLINIC | Age: 41
End: 2021-03-03

## 2021-03-16 ENCOUNTER — OFFICE VISIT (OUTPATIENT)
Dept: URGENT CARE | Facility: CLINIC | Age: 41
End: 2021-03-16
Payer: OTHER GOVERNMENT

## 2021-03-16 VITALS
DIASTOLIC BLOOD PRESSURE: 75 MMHG | HEART RATE: 70 BPM | TEMPERATURE: 98 F | SYSTOLIC BLOOD PRESSURE: 117 MMHG | WEIGHT: 213.94 LBS | OXYGEN SATURATION: 97 % | BODY MASS INDEX: 33.51 KG/M2

## 2021-03-16 DIAGNOSIS — J02.9 PHARYNGITIS, UNSPECIFIED ETIOLOGY: ICD-10-CM

## 2021-03-16 DIAGNOSIS — R53.83 FATIGUE, UNSPECIFIED TYPE: ICD-10-CM

## 2021-03-16 DIAGNOSIS — J01.90 ACUTE BACTERIAL SINUSITIS: ICD-10-CM

## 2021-03-16 DIAGNOSIS — B96.89 ACUTE BACTERIAL SINUSITIS: ICD-10-CM

## 2021-03-16 DIAGNOSIS — R51.9 SINUS HEADACHE: ICD-10-CM

## 2021-03-16 DIAGNOSIS — R06.02 SOB (SHORTNESS OF BREATH): Primary | ICD-10-CM

## 2021-03-16 PROCEDURE — 99214 OFFICE O/P EST MOD 30 MIN: CPT | Mod: S$PBB,,, | Performed by: NURSE PRACTITIONER

## 2021-03-16 PROCEDURE — 99999 PR PBB SHADOW E&M-EST. PATIENT-LVL III: ICD-10-PCS | Mod: PBBFAC,,, | Performed by: NURSE PRACTITIONER

## 2021-03-16 PROCEDURE — 99999 PR PBB SHADOW E&M-EST. PATIENT-LVL III: CPT | Mod: PBBFAC,,, | Performed by: NURSE PRACTITIONER

## 2021-03-16 PROCEDURE — 99213 OFFICE O/P EST LOW 20 MIN: CPT | Mod: PBBFAC,PO | Performed by: NURSE PRACTITIONER

## 2021-03-16 PROCEDURE — 99214 PR OFFICE/OUTPT VISIT, EST, LEVL IV, 30-39 MIN: ICD-10-PCS | Mod: S$PBB,,, | Performed by: NURSE PRACTITIONER

## 2021-03-16 RX ORDER — PROMETHAZINE HYDROCHLORIDE AND DEXTROMETHORPHAN HYDROBROMIDE 6.25; 15 MG/5ML; MG/5ML
5 SYRUP ORAL
Qty: 180 ML | Refills: 0 | Status: SHIPPED | OUTPATIENT
Start: 2021-03-16 | End: 2021-05-31 | Stop reason: ALTCHOICE

## 2021-03-16 RX ORDER — AMOXICILLIN AND CLAVULANATE POTASSIUM 875; 125 MG/1; MG/1
1 TABLET, FILM COATED ORAL EVERY 12 HOURS
Qty: 20 TABLET | Refills: 0 | Status: SHIPPED | OUTPATIENT
Start: 2021-03-16 | End: 2021-03-26

## 2021-03-19 ENCOUNTER — OFFICE VISIT (OUTPATIENT)
Dept: FAMILY MEDICINE | Facility: CLINIC | Age: 41
End: 2021-03-19
Payer: OTHER GOVERNMENT

## 2021-03-19 ENCOUNTER — PATIENT MESSAGE (OUTPATIENT)
Dept: FAMILY MEDICINE | Facility: CLINIC | Age: 41
End: 2021-03-19

## 2021-03-19 VITALS
WEIGHT: 199.94 LBS | BODY MASS INDEX: 31.38 KG/M2 | DIASTOLIC BLOOD PRESSURE: 68 MMHG | OXYGEN SATURATION: 98 % | SYSTOLIC BLOOD PRESSURE: 108 MMHG | TEMPERATURE: 97 F | HEIGHT: 67 IN | HEART RATE: 84 BPM

## 2021-03-19 DIAGNOSIS — M54.9 ACUTE MIDLINE BACK PAIN, UNSPECIFIED BACK LOCATION: ICD-10-CM

## 2021-03-19 DIAGNOSIS — M54.40 ACUTE MIDLINE LOW BACK PAIN WITH SCIATICA, SCIATICA LATERALITY UNSPECIFIED: Primary | ICD-10-CM

## 2021-03-19 DIAGNOSIS — M62.830 BACK SPASM: ICD-10-CM

## 2021-03-19 LAB
BILIRUB SERPL-MCNC: NORMAL MG/DL
BLOOD URINE, POC: NORMAL
CLARITY, POC UA: CLEAR
COLOR, POC UA: NORMAL
GLUCOSE UR QL STRIP: NORMAL
KETONES UR QL STRIP: NORMAL
LEUKOCYTE ESTERASE URINE, POC: NORMAL
NITRITE, POC UA: NORMAL
PH, POC UA: 7
PROTEIN, POC: NORMAL
SPECIFIC GRAVITY, POC UA: 1.01
UROBILINOGEN, POC UA: NORMAL

## 2021-03-19 PROCEDURE — 96372 THER/PROPH/DIAG INJ SC/IM: CPT | Mod: PBBFAC,PO

## 2021-03-19 PROCEDURE — 99214 OFFICE O/P EST MOD 30 MIN: CPT | Mod: S$PBB,,, | Performed by: FAMILY MEDICINE

## 2021-03-19 PROCEDURE — 99999 PR PBB SHADOW E&M-EST. PATIENT-LVL IV: CPT | Mod: PBBFAC,,,

## 2021-03-19 PROCEDURE — 99214 OFFICE O/P EST MOD 30 MIN: CPT | Mod: PBBFAC,PO | Performed by: NURSE PRACTITIONER

## 2021-03-19 PROCEDURE — 99999 PR PBB SHADOW E&M-EST. PATIENT-LVL IV: ICD-10-PCS | Mod: PBBFAC,,,

## 2021-03-19 PROCEDURE — 81002 URINALYSIS NONAUTO W/O SCOPE: CPT | Mod: PBBFAC,PO | Performed by: NURSE PRACTITIONER

## 2021-03-19 PROCEDURE — 99214 PR OFFICE/OUTPT VISIT, EST, LEVL IV, 30-39 MIN: ICD-10-PCS | Mod: S$PBB,,, | Performed by: FAMILY MEDICINE

## 2021-03-19 RX ORDER — METHYLPREDNISOLONE ACETATE 80 MG/ML
80 INJECTION, SUSPENSION INTRA-ARTICULAR; INTRALESIONAL; INTRAMUSCULAR; SOFT TISSUE
Status: COMPLETED | OUTPATIENT
Start: 2021-03-19 | End: 2021-03-19

## 2021-03-19 RX ORDER — KETOROLAC TROMETHAMINE 30 MG/ML
60 INJECTION, SOLUTION INTRAMUSCULAR; INTRAVENOUS
Status: COMPLETED | OUTPATIENT
Start: 2021-03-19 | End: 2021-03-19

## 2021-03-19 RX ORDER — METHOCARBAMOL 750 MG/1
500 TABLET, FILM COATED ORAL 4 TIMES DAILY
COMMUNITY
End: 2021-03-19

## 2021-03-19 RX ORDER — ORPHENADRINE CITRATE 100 MG/1
100 TABLET, EXTENDED RELEASE ORAL 2 TIMES DAILY
Qty: 20 TABLET | Refills: 0 | Status: SHIPPED | OUTPATIENT
Start: 2021-03-19 | End: 2021-03-29

## 2021-03-19 RX ORDER — DICLOFENAC SODIUM 75 MG/1
75 TABLET, DELAYED RELEASE ORAL 2 TIMES DAILY
Qty: 30 TABLET | Refills: 1 | Status: SHIPPED | OUTPATIENT
Start: 2021-03-19 | End: 2021-05-31 | Stop reason: ALTCHOICE

## 2021-03-19 RX ADMIN — METHYLPREDNISOLONE ACETATE 80 MG: 80 INJECTION, SUSPENSION INTRALESIONAL; INTRAMUSCULAR; INTRASYNOVIAL; SOFT TISSUE at 08:03

## 2021-03-19 RX ADMIN — KETOROLAC TROMETHAMINE 60 MG: 60 INJECTION, SOLUTION INTRAMUSCULAR at 08:03

## 2021-04-01 ENCOUNTER — HOSPITAL ENCOUNTER (OUTPATIENT)
Dept: CARDIOLOGY | Facility: HOSPITAL | Age: 41
Discharge: HOME | End: 2021-04-01
Attending: INTERNAL MEDICINE
Payer: OTHER GOVERNMENT

## 2021-04-01 ENCOUNTER — OFFICE VISIT (OUTPATIENT)
Dept: CARDIOLOGY | Facility: CLINIC | Age: 41
End: 2021-04-01
Payer: OTHER GOVERNMENT

## 2021-04-01 VITALS
SYSTOLIC BLOOD PRESSURE: 100 MMHG | WEIGHT: 199.94 LBS | HEART RATE: 66 BPM | BODY MASS INDEX: 31.32 KG/M2 | DIASTOLIC BLOOD PRESSURE: 72 MMHG | OXYGEN SATURATION: 99 %

## 2021-04-01 DIAGNOSIS — Z76.89 ESTABLISHING CARE WITH NEW DOCTOR, ENCOUNTER FOR: ICD-10-CM

## 2021-04-01 DIAGNOSIS — Z86.16 HISTORY OF 2019 NOVEL CORONAVIRUS DISEASE (COVID-19): ICD-10-CM

## 2021-04-01 DIAGNOSIS — J30.2 SEASONAL ALLERGIES: ICD-10-CM

## 2021-04-01 DIAGNOSIS — R06.02 SHORTNESS OF BREATH: Primary | ICD-10-CM

## 2021-04-01 DIAGNOSIS — E78.5 HYPERLIPIDEMIA, UNSPECIFIED HYPERLIPIDEMIA TYPE: ICD-10-CM

## 2021-04-01 DIAGNOSIS — Z76.89 ESTABLISHING CARE WITH NEW DOCTOR, ENCOUNTER FOR: Primary | ICD-10-CM

## 2021-04-01 PROCEDURE — 99205 PR OFFICE/OUTPT VISIT, NEW, LEVL V, 60-74 MIN: ICD-10-PCS | Mod: S$PBB,,, | Performed by: INTERNAL MEDICINE

## 2021-04-01 PROCEDURE — 93010 EKG 12-LEAD: ICD-10-PCS | Mod: ,,, | Performed by: INTERNAL MEDICINE

## 2021-04-01 PROCEDURE — 99999 PR PBB SHADOW E&M-EST. PATIENT-LVL III: ICD-10-PCS | Mod: PBBFAC,,, | Performed by: INTERNAL MEDICINE

## 2021-04-01 PROCEDURE — 99205 OFFICE O/P NEW HI 60 MIN: CPT | Mod: S$PBB,,, | Performed by: INTERNAL MEDICINE

## 2021-04-01 PROCEDURE — 93010 ELECTROCARDIOGRAM REPORT: CPT | Mod: ,,, | Performed by: INTERNAL MEDICINE

## 2021-04-01 PROCEDURE — 99213 OFFICE O/P EST LOW 20 MIN: CPT | Mod: PBBFAC,25 | Performed by: INTERNAL MEDICINE

## 2021-04-01 PROCEDURE — 99999 PR PBB SHADOW E&M-EST. PATIENT-LVL III: CPT | Mod: PBBFAC,,, | Performed by: INTERNAL MEDICINE

## 2021-04-01 PROCEDURE — 93005 ELECTROCARDIOGRAM TRACING: CPT

## 2021-04-08 ENCOUNTER — PATIENT OUTREACH (OUTPATIENT)
Dept: ADMINISTRATIVE | Facility: OTHER | Age: 41
End: 2021-04-08

## 2021-04-28 ENCOUNTER — PATIENT MESSAGE (OUTPATIENT)
Dept: CARDIOLOGY | Facility: HOSPITAL | Age: 41
End: 2021-04-28

## 2021-04-29 ENCOUNTER — PATIENT MESSAGE (OUTPATIENT)
Dept: RESEARCH | Facility: HOSPITAL | Age: 41
End: 2021-04-29

## 2021-05-31 ENCOUNTER — OFFICE VISIT (OUTPATIENT)
Dept: INTERNAL MEDICINE | Facility: CLINIC | Age: 41
End: 2021-05-31
Payer: OTHER GOVERNMENT

## 2021-05-31 ENCOUNTER — TELEPHONE (OUTPATIENT)
Dept: ORTHOPEDICS | Facility: CLINIC | Age: 41
End: 2021-05-31

## 2021-05-31 VITALS
HEIGHT: 67 IN | TEMPERATURE: 99 F | DIASTOLIC BLOOD PRESSURE: 68 MMHG | BODY MASS INDEX: 31.33 KG/M2 | OXYGEN SATURATION: 98 % | SYSTOLIC BLOOD PRESSURE: 132 MMHG | HEART RATE: 68 BPM | WEIGHT: 199.63 LBS

## 2021-05-31 DIAGNOSIS — E66.9 OBESITY (BMI 30-39.9): ICD-10-CM

## 2021-05-31 DIAGNOSIS — Z86.16 HISTORY OF 2019 NOVEL CORONAVIRUS DISEASE (COVID-19): Primary | ICD-10-CM

## 2021-05-31 DIAGNOSIS — M25.572 ACUTE LEFT ANKLE PAIN: ICD-10-CM

## 2021-05-31 PROCEDURE — 99214 OFFICE O/P EST MOD 30 MIN: CPT | Mod: S$PBB,,, | Performed by: FAMILY MEDICINE

## 2021-05-31 PROCEDURE — 99215 OFFICE O/P EST HI 40 MIN: CPT | Mod: PBBFAC,PN | Performed by: FAMILY MEDICINE

## 2021-05-31 PROCEDURE — 99999 PR PBB SHADOW E&M-EST. PATIENT-LVL V: CPT | Mod: PBBFAC,,, | Performed by: FAMILY MEDICINE

## 2021-05-31 PROCEDURE — 99214 PR OFFICE/OUTPT VISIT, EST, LEVL IV, 30-39 MIN: ICD-10-PCS | Mod: S$PBB,,, | Performed by: FAMILY MEDICINE

## 2021-05-31 PROCEDURE — 99999 PR PBB SHADOW E&M-EST. PATIENT-LVL V: ICD-10-PCS | Mod: PBBFAC,,, | Performed by: FAMILY MEDICINE

## 2021-06-01 ENCOUNTER — NUTRITION (OUTPATIENT)
Dept: INTERNAL MEDICINE | Facility: CLINIC | Age: 41
End: 2021-06-01
Payer: OTHER GOVERNMENT

## 2021-06-01 ENCOUNTER — TELEPHONE (OUTPATIENT)
Dept: ORTHOPEDICS | Facility: CLINIC | Age: 41
End: 2021-06-01

## 2021-06-01 ENCOUNTER — APPOINTMENT (OUTPATIENT)
Dept: RADIOLOGY | Facility: HOSPITAL | Age: 41
End: 2021-06-01
Attending: FAMILY MEDICINE
Payer: OTHER GOVERNMENT

## 2021-06-01 DIAGNOSIS — M25.572 ACUTE LEFT ANKLE PAIN: ICD-10-CM

## 2021-06-01 DIAGNOSIS — Z86.16 HISTORY OF 2019 NOVEL CORONAVIRUS DISEASE (COVID-19): ICD-10-CM

## 2021-06-01 DIAGNOSIS — E66.9 OBESITY (BMI 30-39.9): ICD-10-CM

## 2021-06-01 PROCEDURE — 97802 PR MED NUTR THER, 1ST, INDIV, EA 15 MIN: ICD-10-PCS | Mod: S$GLB,,, | Performed by: DIETITIAN, REGISTERED

## 2021-06-01 PROCEDURE — 73610 XR ANKLE COMPLETE 3 VIEW LEFT: ICD-10-PCS | Mod: 26,LT,, | Performed by: RADIOLOGY

## 2021-06-01 PROCEDURE — 97802 MEDICAL NUTRITION INDIV IN: CPT | Mod: S$GLB,,, | Performed by: DIETITIAN, REGISTERED

## 2021-06-01 PROCEDURE — 73610 X-RAY EXAM OF ANKLE: CPT | Mod: TC,PO,LT

## 2021-06-01 PROCEDURE — 73610 X-RAY EXAM OF ANKLE: CPT | Mod: 26,LT,, | Performed by: RADIOLOGY

## 2021-06-02 ENCOUNTER — TELEPHONE (OUTPATIENT)
Dept: ORTHOPEDICS | Facility: CLINIC | Age: 41
End: 2021-06-02

## 2021-06-29 ENCOUNTER — OFFICE VISIT (OUTPATIENT)
Dept: INTERNAL MEDICINE | Facility: CLINIC | Age: 41
End: 2021-06-29
Payer: OTHER GOVERNMENT

## 2021-06-29 VITALS
TEMPERATURE: 98 F | SYSTOLIC BLOOD PRESSURE: 122 MMHG | DIASTOLIC BLOOD PRESSURE: 64 MMHG | BODY MASS INDEX: 34.35 KG/M2 | HEART RATE: 75 BPM | WEIGHT: 201.19 LBS | OXYGEN SATURATION: 96 % | HEIGHT: 64 IN

## 2021-06-29 DIAGNOSIS — J02.9 PHARYNGITIS, UNSPECIFIED ETIOLOGY: Primary | ICD-10-CM

## 2021-06-29 LAB
CTP QC/QA: YES
S PYO RRNA THROAT QL PROBE: NEGATIVE

## 2021-06-29 PROCEDURE — 99213 OFFICE O/P EST LOW 20 MIN: CPT | Mod: PBBFAC,PN | Performed by: FAMILY MEDICINE

## 2021-06-29 PROCEDURE — 99999 PR PBB SHADOW E&M-EST. PATIENT-LVL III: CPT | Mod: PBBFAC,,, | Performed by: FAMILY MEDICINE

## 2021-06-29 PROCEDURE — U0003 INFECTIOUS AGENT DETECTION BY NUCLEIC ACID (DNA OR RNA); SEVERE ACUTE RESPIRATORY SYNDROME CORONAVIRUS 2 (SARS-COV-2) (CORONAVIRUS DISEASE [COVID-19]), AMPLIFIED PROBE TECHNIQUE, MAKING USE OF HIGH THROUGHPUT TECHNOLOGIES AS DESCRIBED BY CMS-2020-01-R: HCPCS | Performed by: FAMILY MEDICINE

## 2021-06-29 PROCEDURE — 99213 PR OFFICE/OUTPT VISIT, EST, LEVL III, 20-29 MIN: ICD-10-PCS | Mod: S$PBB,,, | Performed by: FAMILY MEDICINE

## 2021-06-29 PROCEDURE — 99213 OFFICE O/P EST LOW 20 MIN: CPT | Mod: S$PBB,,, | Performed by: FAMILY MEDICINE

## 2021-06-29 PROCEDURE — 87880 STREP A ASSAY W/OPTIC: CPT | Mod: PBBFAC,PN | Performed by: FAMILY MEDICINE

## 2021-06-29 PROCEDURE — 99999 PR PBB SHADOW E&M-EST. PATIENT-LVL III: ICD-10-PCS | Mod: PBBFAC,,, | Performed by: FAMILY MEDICINE

## 2021-06-29 PROCEDURE — U0005 INFEC AGEN DETEC AMPLI PROBE: HCPCS | Performed by: FAMILY MEDICINE

## 2021-06-30 ENCOUNTER — APPOINTMENT (OUTPATIENT)
Dept: RADIOLOGY | Facility: HOSPITAL | Age: 41
End: 2021-06-30
Attending: FAMILY MEDICINE
Payer: OTHER GOVERNMENT

## 2021-06-30 ENCOUNTER — OFFICE VISIT (OUTPATIENT)
Dept: INTERNAL MEDICINE | Facility: CLINIC | Age: 41
End: 2021-06-30
Payer: OTHER GOVERNMENT

## 2021-06-30 VITALS
OXYGEN SATURATION: 98 % | WEIGHT: 199.94 LBS | HEART RATE: 66 BPM | TEMPERATURE: 98 F | BODY MASS INDEX: 34.32 KG/M2 | SYSTOLIC BLOOD PRESSURE: 130 MMHG | DIASTOLIC BLOOD PRESSURE: 86 MMHG

## 2021-06-30 DIAGNOSIS — R05.9 COUGH: ICD-10-CM

## 2021-06-30 DIAGNOSIS — R05.9 COUGH: Primary | ICD-10-CM

## 2021-06-30 LAB — SARS-COV-2 RNA RESP QL NAA+PROBE: NOT DETECTED

## 2021-06-30 PROCEDURE — 99999 PR PBB SHADOW E&M-EST. PATIENT-LVL III: ICD-10-PCS | Mod: PBBFAC,,, | Performed by: FAMILY MEDICINE

## 2021-06-30 PROCEDURE — 99213 OFFICE O/P EST LOW 20 MIN: CPT | Mod: S$PBB,,, | Performed by: FAMILY MEDICINE

## 2021-06-30 PROCEDURE — 99999 PR PBB SHADOW E&M-EST. PATIENT-LVL III: CPT | Mod: PBBFAC,,, | Performed by: FAMILY MEDICINE

## 2021-06-30 PROCEDURE — 99213 PR OFFICE/OUTPT VISIT, EST, LEVL III, 20-29 MIN: ICD-10-PCS | Mod: S$PBB,,, | Performed by: FAMILY MEDICINE

## 2021-06-30 PROCEDURE — 71046 X-RAY EXAM CHEST 2 VIEWS: CPT | Mod: 26,,, | Performed by: RADIOLOGY

## 2021-06-30 PROCEDURE — 71046 XR CHEST PA AND LATERAL: ICD-10-PCS | Mod: 26,,, | Performed by: RADIOLOGY

## 2021-06-30 PROCEDURE — 71046 X-RAY EXAM CHEST 2 VIEWS: CPT | Mod: TC,PO

## 2021-06-30 PROCEDURE — 99213 OFFICE O/P EST LOW 20 MIN: CPT | Mod: PBBFAC,25,PN | Performed by: FAMILY MEDICINE

## 2021-07-09 ENCOUNTER — PATIENT MESSAGE (OUTPATIENT)
Dept: INTERNAL MEDICINE | Facility: CLINIC | Age: 41
End: 2021-07-09

## 2021-07-22 ENCOUNTER — PATIENT MESSAGE (OUTPATIENT)
Dept: INTERNAL MEDICINE | Facility: CLINIC | Age: 41
End: 2021-07-22

## 2021-07-27 ENCOUNTER — PATIENT MESSAGE (OUTPATIENT)
Dept: PEDIATRICS | Facility: CLINIC | Age: 41
End: 2021-07-27

## 2021-08-24 ENCOUNTER — IMMUNIZATION (OUTPATIENT)
Dept: PRIMARY CARE CLINIC | Facility: CLINIC | Age: 41
End: 2021-08-24
Payer: OTHER GOVERNMENT

## 2021-08-24 DIAGNOSIS — Z23 NEED FOR VACCINATION: Primary | ICD-10-CM

## 2021-08-24 PROCEDURE — 0001A COVID-19, MRNA, LNP-S, PF, 30 MCG/0.3 ML DOSE VACCINE: ICD-10-PCS | Mod: CV19,S$GLB,, | Performed by: FAMILY MEDICINE

## 2021-08-24 PROCEDURE — 91300 COVID-19, MRNA, LNP-S, PF, 30 MCG/0.3 ML DOSE VACCINE: ICD-10-PCS | Mod: S$GLB,,, | Performed by: FAMILY MEDICINE

## 2021-08-24 PROCEDURE — 0001A COVID-19, MRNA, LNP-S, PF, 30 MCG/0.3 ML DOSE VACCINE: CPT | Mod: CV19,S$GLB,, | Performed by: FAMILY MEDICINE

## 2021-08-24 PROCEDURE — 91300 COVID-19, MRNA, LNP-S, PF, 30 MCG/0.3 ML DOSE VACCINE: CPT | Mod: S$GLB,,, | Performed by: FAMILY MEDICINE

## 2021-09-17 ENCOUNTER — IMMUNIZATION (OUTPATIENT)
Dept: PRIMARY CARE CLINIC | Facility: CLINIC | Age: 41
End: 2021-09-17
Payer: OTHER GOVERNMENT

## 2021-09-17 DIAGNOSIS — Z23 NEED FOR VACCINATION: Primary | ICD-10-CM

## 2021-09-17 PROCEDURE — 0002A COVID-19, MRNA, LNP-S, PF, 30 MCG/0.3 ML DOSE VACCINE: ICD-10-PCS | Mod: CV19,S$GLB,, | Performed by: INTERNAL MEDICINE

## 2021-09-17 PROCEDURE — 91300 COVID-19, MRNA, LNP-S, PF, 30 MCG/0.3 ML DOSE VACCINE: CPT | Mod: S$GLB,,, | Performed by: INTERNAL MEDICINE

## 2021-09-17 PROCEDURE — 0002A COVID-19, MRNA, LNP-S, PF, 30 MCG/0.3 ML DOSE VACCINE: CPT | Mod: CV19,S$GLB,, | Performed by: INTERNAL MEDICINE

## 2021-09-17 PROCEDURE — 91300 COVID-19, MRNA, LNP-S, PF, 30 MCG/0.3 ML DOSE VACCINE: ICD-10-PCS | Mod: S$GLB,,, | Performed by: INTERNAL MEDICINE

## 2021-10-04 ENCOUNTER — PATIENT MESSAGE (OUTPATIENT)
Dept: PSYCHIATRY | Facility: CLINIC | Age: 41
End: 2021-10-04

## 2021-10-05 ENCOUNTER — PATIENT MESSAGE (OUTPATIENT)
Dept: PSYCHIATRY | Facility: CLINIC | Age: 41
End: 2021-10-05

## 2021-10-05 ENCOUNTER — TELEPHONE (OUTPATIENT)
Dept: PSYCHIATRY | Facility: CLINIC | Age: 41
End: 2021-10-05

## 2021-10-13 ENCOUNTER — PATIENT MESSAGE (OUTPATIENT)
Dept: INTERNAL MEDICINE | Facility: CLINIC | Age: 41
End: 2021-10-13
Payer: OTHER GOVERNMENT

## 2021-10-15 ENCOUNTER — PATIENT MESSAGE (OUTPATIENT)
Dept: PSYCHIATRY | Facility: CLINIC | Age: 41
End: 2021-10-15

## 2021-10-26 ENCOUNTER — OFFICE VISIT (OUTPATIENT)
Dept: PSYCHIATRY | Facility: CLINIC | Age: 41
End: 2021-10-26
Payer: OTHER GOVERNMENT

## 2021-10-26 DIAGNOSIS — F43.10 PTSD (POST-TRAUMATIC STRESS DISORDER): Primary | ICD-10-CM

## 2021-10-26 DIAGNOSIS — Z63.5 MARITAL CONFLICT INVOLVING DIVORCE: ICD-10-CM

## 2021-10-26 PROCEDURE — 99999 PR PBB SHADOW E&M-EST. PATIENT-LVL I: CPT | Mod: PBBFAC,,, | Performed by: SOCIAL WORKER

## 2021-10-26 PROCEDURE — 99211 OFF/OP EST MAY X REQ PHY/QHP: CPT | Mod: PBBFAC | Performed by: SOCIAL WORKER

## 2021-10-26 PROCEDURE — 99999 PR PBB SHADOW E&M-EST. PATIENT-LVL I: ICD-10-PCS | Mod: PBBFAC,,, | Performed by: SOCIAL WORKER

## 2021-10-26 PROCEDURE — 90834 PSYTX W PT 45 MINUTES: CPT | Mod: ,,, | Performed by: SOCIAL WORKER

## 2021-10-26 PROCEDURE — 90834 PR PSYCHOTHERAPY W/PATIENT, 45 MIN: ICD-10-PCS | Mod: ,,, | Performed by: SOCIAL WORKER

## 2021-10-26 SDOH — SOCIAL DETERMINANTS OF HEALTH (SDOH): DISRUPTION OF FAMILY BY SEPARATION AND DIVORCE: Z63.5

## 2021-11-09 ENCOUNTER — OFFICE VISIT (OUTPATIENT)
Dept: PSYCHIATRY | Facility: CLINIC | Age: 41
End: 2021-11-09
Payer: OTHER GOVERNMENT

## 2021-11-09 DIAGNOSIS — Z63.0 PARTNER RELATIONSHIP PROBLEMS: ICD-10-CM

## 2021-11-09 DIAGNOSIS — F32.A CHRONIC DEPRESSIVE DISORDER: ICD-10-CM

## 2021-11-09 DIAGNOSIS — Z63.9 DYSFUNCTIONAL FAMILY PROCESSES: ICD-10-CM

## 2021-11-09 DIAGNOSIS — F43.10 PTSD (POST-TRAUMATIC STRESS DISORDER): Primary | ICD-10-CM

## 2021-11-09 PROCEDURE — 90834 PSYTX W PT 45 MINUTES: CPT | Mod: ,,, | Performed by: SOCIAL WORKER

## 2021-11-09 PROCEDURE — 90834 PR PSYCHOTHERAPY W/PATIENT, 45 MIN: ICD-10-PCS | Mod: ,,, | Performed by: SOCIAL WORKER

## 2021-11-09 SDOH — SOCIAL DETERMINANTS OF HEALTH (SDOH): PROBLEMS IN RELATIONSHIP WITH SPOUSE OR PARTNER: Z63.0

## 2021-11-09 SDOH — SOCIAL DETERMINANTS OF HEALTH (SDOH): PROBLEM RELATED TO PRIMARY SUPPORT GROUP, UNSPECIFIED: Z63.9

## 2021-11-10 ENCOUNTER — OFFICE VISIT (OUTPATIENT)
Dept: INTERNAL MEDICINE | Facility: CLINIC | Age: 41
End: 2021-11-10
Payer: OTHER GOVERNMENT

## 2021-11-10 VITALS — HEIGHT: 64 IN | WEIGHT: 200 LBS | BODY MASS INDEX: 34.15 KG/M2

## 2021-11-10 DIAGNOSIS — Z86.16 HISTORY OF 2019 NOVEL CORONAVIRUS DISEASE (COVID-19): Primary | ICD-10-CM

## 2021-11-10 DIAGNOSIS — F43.9 STRESS: ICD-10-CM

## 2021-11-10 DIAGNOSIS — R06.02 SHORTNESS OF BREATH: ICD-10-CM

## 2021-11-10 DIAGNOSIS — Z13.29 THYROID DISORDER SCREEN: ICD-10-CM

## 2021-11-10 DIAGNOSIS — Z11.4 SCREENING FOR HIV (HUMAN IMMUNODEFICIENCY VIRUS): ICD-10-CM

## 2021-11-10 DIAGNOSIS — Z12.31 ENCOUNTER FOR SCREENING MAMMOGRAM FOR BREAST CANCER: ICD-10-CM

## 2021-11-10 DIAGNOSIS — N92.6 IRREGULAR BLEEDING: ICD-10-CM

## 2021-11-10 DIAGNOSIS — Z13.220 SCREENING FOR LIPOID DISORDERS: ICD-10-CM

## 2021-11-10 DIAGNOSIS — Z00.00 ROUTINE GENERAL MEDICAL EXAMINATION AT A HEALTH CARE FACILITY: Primary | ICD-10-CM

## 2021-11-10 DIAGNOSIS — Z11.59 NEED FOR HEPATITIS C SCREENING TEST: ICD-10-CM

## 2021-11-10 DIAGNOSIS — R07.9 CHEST PAIN AT REST: ICD-10-CM

## 2021-11-10 PROCEDURE — 99214 PR OFFICE/OUTPT VISIT, EST, LEVL IV, 30-39 MIN: ICD-10-PCS | Mod: 95,,, | Performed by: FAMILY MEDICINE

## 2021-11-10 PROCEDURE — 99214 OFFICE O/P EST MOD 30 MIN: CPT | Mod: 95,,, | Performed by: FAMILY MEDICINE

## 2021-11-15 ENCOUNTER — PATIENT OUTREACH (OUTPATIENT)
Dept: ADMINISTRATIVE | Facility: OTHER | Age: 41
End: 2021-11-15
Payer: OTHER GOVERNMENT

## 2021-11-16 ENCOUNTER — OFFICE VISIT (OUTPATIENT)
Dept: OBSTETRICS AND GYNECOLOGY | Facility: CLINIC | Age: 41
End: 2021-11-16
Payer: OTHER GOVERNMENT

## 2021-11-16 ENCOUNTER — LAB VISIT (OUTPATIENT)
Dept: LAB | Facility: HOSPITAL | Age: 41
End: 2021-11-16
Attending: FAMILY MEDICINE
Payer: OTHER GOVERNMENT

## 2021-11-16 ENCOUNTER — OFFICE VISIT (OUTPATIENT)
Dept: PSYCHIATRY | Facility: CLINIC | Age: 41
End: 2021-11-16
Payer: OTHER GOVERNMENT

## 2021-11-16 VITALS — WEIGHT: 205.5 LBS | BODY MASS INDEX: 35.27 KG/M2 | DIASTOLIC BLOOD PRESSURE: 62 MMHG | SYSTOLIC BLOOD PRESSURE: 118 MMHG

## 2021-11-16 DIAGNOSIS — Z63.0 PARTNER RELATIONSHIP PROBLEMS: ICD-10-CM

## 2021-11-16 DIAGNOSIS — N92.6 IRREGULAR BLEEDING: Primary | ICD-10-CM

## 2021-11-16 DIAGNOSIS — N92.6 IRREGULAR BLEEDING: ICD-10-CM

## 2021-11-16 DIAGNOSIS — F43.10 PTSD (POST-TRAUMATIC STRESS DISORDER): Primary | ICD-10-CM

## 2021-11-16 DIAGNOSIS — Z01.89 LABORATORY EXAMINATION: ICD-10-CM

## 2021-11-16 DIAGNOSIS — Z11.3 SCREENING EXAMINATION FOR STD (SEXUALLY TRANSMITTED DISEASE): ICD-10-CM

## 2021-11-16 DIAGNOSIS — F32.A CHRONIC DEPRESSIVE DISORDER: ICD-10-CM

## 2021-11-16 DIAGNOSIS — Z12.4 PAPANICOLAOU SMEAR FOR CERVICAL CANCER SCREENING: ICD-10-CM

## 2021-11-16 DIAGNOSIS — Z30.49 ENCOUNTER FOR INITIAL MANAGEMENT OF NUVARING: ICD-10-CM

## 2021-11-16 DIAGNOSIS — Z63.9 DYSFUNCTIONAL FAMILY PROCESSES: ICD-10-CM

## 2021-11-16 LAB
ALBUMIN SERPL BCP-MCNC: 4.3 G/DL (ref 3.5–5.2)
ALP SERPL-CCNC: 58 U/L (ref 55–135)
ALT SERPL W/O P-5'-P-CCNC: 18 U/L (ref 10–44)
ANION GAP SERPL CALC-SCNC: 13 MMOL/L (ref 8–16)
AST SERPL-CCNC: 23 U/L (ref 10–40)
B-HCG UR QL: NEGATIVE
BILIRUB SERPL-MCNC: 0.5 MG/DL (ref 0.1–1)
BUN SERPL-MCNC: 9 MG/DL (ref 6–20)
CALCIUM SERPL-MCNC: 9.7 MG/DL (ref 8.7–10.5)
CHLORIDE SERPL-SCNC: 106 MMOL/L (ref 95–110)
CO2 SERPL-SCNC: 23 MMOL/L (ref 23–29)
CREAT SERPL-MCNC: 0.7 MG/DL (ref 0.5–1.4)
CTP QC/QA: YES
EST. GFR  (AFRICAN AMERICAN): >60 ML/MIN/1.73 M^2
EST. GFR  (NON AFRICAN AMERICAN): >60 ML/MIN/1.73 M^2
GLUCOSE SERPL-MCNC: 80 MG/DL (ref 70–110)
POTASSIUM SERPL-SCNC: 4.1 MMOL/L (ref 3.5–5.1)
PROT SERPL-MCNC: 7.3 G/DL (ref 6–8.4)
SODIUM SERPL-SCNC: 142 MMOL/L (ref 136–145)

## 2021-11-16 PROCEDURE — 99213 OFFICE O/P EST LOW 20 MIN: CPT | Mod: PBBFAC,25 | Performed by: NURSE PRACTITIONER

## 2021-11-16 PROCEDURE — 86592 SYPHILIS TEST NON-TREP QUAL: CPT | Performed by: NURSE PRACTITIONER

## 2021-11-16 PROCEDURE — 80053 COMPREHEN METABOLIC PANEL: CPT | Performed by: NURSE PRACTITIONER

## 2021-11-16 PROCEDURE — 99214 OFFICE O/P EST MOD 30 MIN: CPT | Mod: 25,S$PBB,, | Performed by: NURSE PRACTITIONER

## 2021-11-16 PROCEDURE — 99999 PR PBB SHADOW E&M-EST. PATIENT-LVL III: ICD-10-PCS | Mod: PBBFAC,,, | Performed by: NURSE PRACTITIONER

## 2021-11-16 PROCEDURE — 58100 BIOPSY (GYNECOLOGICAL): ICD-10-PCS | Mod: S$PBB,,, | Performed by: NURSE PRACTITIONER

## 2021-11-16 PROCEDURE — 36415 COLL VENOUS BLD VENIPUNCTURE: CPT | Performed by: NURSE PRACTITIONER

## 2021-11-16 PROCEDURE — 87624 HPV HI-RISK TYP POOLED RSLT: CPT | Performed by: NURSE PRACTITIONER

## 2021-11-16 PROCEDURE — 58100 BIOPSY OF UTERUS LINING: CPT | Mod: PBBFAC | Performed by: NURSE PRACTITIONER

## 2021-11-16 PROCEDURE — 88305 TISSUE EXAM BY PATHOLOGIST: CPT | Performed by: STUDENT IN AN ORGANIZED HEALTH CARE EDUCATION/TRAINING PROGRAM

## 2021-11-16 PROCEDURE — 90834 PR PSYCHOTHERAPY W/PATIENT, 45 MIN: ICD-10-PCS | Mod: ,,, | Performed by: SOCIAL WORKER

## 2021-11-16 PROCEDURE — 84443 ASSAY THYROID STIM HORMONE: CPT | Performed by: NURSE PRACTITIONER

## 2021-11-16 PROCEDURE — 88305 TISSUE EXAM BY PATHOLOGIST: ICD-10-PCS | Mod: 26,,, | Performed by: STUDENT IN AN ORGANIZED HEALTH CARE EDUCATION/TRAINING PROGRAM

## 2021-11-16 PROCEDURE — 90834 PSYTX W PT 45 MINUTES: CPT | Mod: ,,, | Performed by: SOCIAL WORKER

## 2021-11-16 PROCEDURE — 88141 PR  CYTOPATH CERV/VAG INTERPRET: ICD-10-PCS | Mod: ,,, | Performed by: PATHOLOGY

## 2021-11-16 PROCEDURE — 85025 COMPLETE CBC W/AUTO DIFF WBC: CPT | Performed by: NURSE PRACTITIONER

## 2021-11-16 PROCEDURE — 99999 PR PBB SHADOW E&M-EST. PATIENT-LVL I: ICD-10-PCS | Mod: PBBFAC,,, | Performed by: SOCIAL WORKER

## 2021-11-16 PROCEDURE — 99999 PR PBB SHADOW E&M-EST. PATIENT-LVL III: CPT | Mod: PBBFAC,,, | Performed by: NURSE PRACTITIONER

## 2021-11-16 PROCEDURE — 58100 BIOPSY OF UTERUS LINING: CPT | Mod: S$PBB,,, | Performed by: NURSE PRACTITIONER

## 2021-11-16 PROCEDURE — 99999 PR PBB SHADOW E&M-EST. PATIENT-LVL I: CPT | Mod: PBBFAC,,, | Performed by: SOCIAL WORKER

## 2021-11-16 PROCEDURE — 88142 CYTOPATH C/V THIN LAYER: CPT | Performed by: PATHOLOGY

## 2021-11-16 PROCEDURE — 88141 CYTOPATH C/V INTERPRET: CPT | Mod: ,,, | Performed by: PATHOLOGY

## 2021-11-16 PROCEDURE — 99214 PR OFFICE/OUTPT VISIT, EST, LEVL IV, 30-39 MIN: ICD-10-PCS | Mod: 25,S$PBB,, | Performed by: NURSE PRACTITIONER

## 2021-11-16 PROCEDURE — 99211 OFF/OP EST MAY X REQ PHY/QHP: CPT | Mod: PBBFAC,25,27 | Performed by: SOCIAL WORKER

## 2021-11-16 PROCEDURE — 88305 TISSUE EXAM BY PATHOLOGIST: CPT | Mod: 26,,, | Performed by: STUDENT IN AN ORGANIZED HEALTH CARE EDUCATION/TRAINING PROGRAM

## 2021-11-16 PROCEDURE — 80074 ACUTE HEPATITIS PANEL: CPT | Performed by: NURSE PRACTITIONER

## 2021-11-16 PROCEDURE — 87389 HIV-1 AG W/HIV-1&-2 AB AG IA: CPT | Performed by: NURSE PRACTITIONER

## 2021-11-16 RX ORDER — ETONOGESTREL AND ETHINYL ESTRADIOL VAGINAL RING .015; .12 MG/D; MG/D
RING VAGINAL
Qty: 1 EACH | Refills: 11 | Status: SHIPPED | OUTPATIENT
Start: 2021-11-16 | End: 2023-11-30

## 2021-11-16 SDOH — SOCIAL DETERMINANTS OF HEALTH (SDOH): PROBLEM RELATED TO PRIMARY SUPPORT GROUP, UNSPECIFIED: Z63.9

## 2021-11-16 SDOH — SOCIAL DETERMINANTS OF HEALTH (SDOH): PROBLEMS IN RELATIONSHIP WITH SPOUSE OR PARTNER: Z63.0

## 2021-11-17 LAB
BASOPHILS # BLD AUTO: 0.05 K/UL (ref 0–0.2)
BASOPHILS NFR BLD: 0.9 % (ref 0–1.9)
DIFFERENTIAL METHOD: ABNORMAL
EOSINOPHIL # BLD AUTO: 0 K/UL (ref 0–0.5)
EOSINOPHIL NFR BLD: 0.7 % (ref 0–8)
ERYTHROCYTE [DISTWIDTH] IN BLOOD BY AUTOMATED COUNT: 14.1 % (ref 11.5–14.5)
HAV IGM SERPL QL IA: NEGATIVE
HBV CORE IGM SERPL QL IA: NEGATIVE
HBV SURFACE AG SERPL QL IA: NEGATIVE
HCT VFR BLD AUTO: 37.9 % (ref 37–48.5)
HCV AB SERPL QL IA: NEGATIVE
HGB BLD-MCNC: 12.1 G/DL (ref 12–16)
IMM GRANULOCYTES # BLD AUTO: 0.01 K/UL (ref 0–0.04)
IMM GRANULOCYTES NFR BLD AUTO: 0.2 % (ref 0–0.5)
LYMPHOCYTES # BLD AUTO: 2 K/UL (ref 1–4.8)
LYMPHOCYTES NFR BLD: 38.2 % (ref 18–48)
MCH RBC QN AUTO: 30.8 PG (ref 27–31)
MCHC RBC AUTO-ENTMCNC: 31.9 G/DL (ref 32–36)
MCV RBC AUTO: 96 FL (ref 82–98)
MONOCYTES # BLD AUTO: 0.3 K/UL (ref 0.3–1)
MONOCYTES NFR BLD: 5.6 % (ref 4–15)
NEUTROPHILS # BLD AUTO: 2.9 K/UL (ref 1.8–7.7)
NEUTROPHILS NFR BLD: 54.4 % (ref 38–73)
NRBC BLD-RTO: 0 /100 WBC
PLATELET # BLD AUTO: 256 K/UL (ref 150–450)
PMV BLD AUTO: 11.2 FL (ref 9.2–12.9)
RBC # BLD AUTO: 3.93 M/UL (ref 4–5.4)
RPR SER QL: NORMAL
TSH SERPL DL<=0.005 MIU/L-ACNC: 1.45 UIU/ML (ref 0.4–4)
WBC # BLD AUTO: 5.34 K/UL (ref 3.9–12.7)

## 2021-11-18 DIAGNOSIS — Z76.89 ESTABLISHING CARE WITH NEW DOCTOR, ENCOUNTER FOR: Primary | ICD-10-CM

## 2021-11-19 ENCOUNTER — HOSPITAL ENCOUNTER (OUTPATIENT)
Dept: CARDIOLOGY | Facility: HOSPITAL | Age: 41
Discharge: HOME | End: 2021-11-19
Attending: INTERNAL MEDICINE
Payer: OTHER GOVERNMENT

## 2021-11-19 ENCOUNTER — OFFICE VISIT (OUTPATIENT)
Dept: CARDIOLOGY | Facility: CLINIC | Age: 41
End: 2021-11-19
Payer: OTHER GOVERNMENT

## 2021-11-19 VITALS
BODY MASS INDEX: 35.19 KG/M2 | HEART RATE: 67 BPM | SYSTOLIC BLOOD PRESSURE: 122 MMHG | OXYGEN SATURATION: 99 % | DIASTOLIC BLOOD PRESSURE: 80 MMHG | WEIGHT: 205 LBS

## 2021-11-19 DIAGNOSIS — I49.1 PAC (PREMATURE ATRIAL CONTRACTION): ICD-10-CM

## 2021-11-19 DIAGNOSIS — R06.02 SHORTNESS OF BREATH: ICD-10-CM

## 2021-11-19 DIAGNOSIS — Z86.16 HISTORY OF 2019 NOVEL CORONAVIRUS DISEASE (COVID-19): ICD-10-CM

## 2021-11-19 DIAGNOSIS — R07.9 CHEST PAIN AT REST: Primary | ICD-10-CM

## 2021-11-19 DIAGNOSIS — Z76.89 ESTABLISHING CARE WITH NEW DOCTOR, ENCOUNTER FOR: ICD-10-CM

## 2021-11-19 DIAGNOSIS — R94.31 EKG ABNORMALITIES: ICD-10-CM

## 2021-11-19 PROCEDURE — 93010 ELECTROCARDIOGRAM REPORT: CPT | Mod: ,,, | Performed by: INTERNAL MEDICINE

## 2021-11-19 PROCEDURE — 93005 ELECTROCARDIOGRAM TRACING: CPT

## 2021-11-19 PROCEDURE — 99999 PR PBB SHADOW E&M-EST. PATIENT-LVL III: CPT | Mod: PBBFAC,,, | Performed by: INTERNAL MEDICINE

## 2021-11-19 PROCEDURE — 99999 PR PBB SHADOW E&M-EST. PATIENT-LVL III: ICD-10-PCS | Mod: PBBFAC,,, | Performed by: INTERNAL MEDICINE

## 2021-11-19 PROCEDURE — 99213 OFFICE O/P EST LOW 20 MIN: CPT | Mod: PBBFAC | Performed by: INTERNAL MEDICINE

## 2021-11-19 PROCEDURE — 99245 OFF/OP CONSLTJ NEW/EST HI 55: CPT | Mod: S$PBB,,, | Performed by: INTERNAL MEDICINE

## 2021-11-19 PROCEDURE — 93010 EKG 12-LEAD: ICD-10-PCS | Mod: ,,, | Performed by: INTERNAL MEDICINE

## 2021-11-19 PROCEDURE — 99245 PR OFFICE CONSULTATION,LEVEL V: ICD-10-PCS | Mod: S$PBB,,, | Performed by: INTERNAL MEDICINE

## 2021-11-23 ENCOUNTER — TELEPHONE (OUTPATIENT)
Dept: OBSTETRICS AND GYNECOLOGY | Facility: CLINIC | Age: 41
End: 2021-11-23
Payer: OTHER GOVERNMENT

## 2021-11-23 LAB — HIV 1+2 AB+HIV1 P24 AG SERPL QL IA: NEGATIVE

## 2021-11-24 ENCOUNTER — TELEPHONE (OUTPATIENT)
Dept: OBSTETRICS AND GYNECOLOGY | Facility: CLINIC | Age: 41
End: 2021-11-24
Payer: OTHER GOVERNMENT

## 2021-11-24 LAB
FINAL PATHOLOGIC DIAGNOSIS: ABNORMAL
FINAL PATHOLOGIC DIAGNOSIS: NORMAL
Lab: ABNORMAL
Lab: NORMAL

## 2021-11-26 ENCOUNTER — TELEPHONE (OUTPATIENT)
Dept: OBSTETRICS AND GYNECOLOGY | Facility: CLINIC | Age: 41
End: 2021-11-26
Payer: OTHER GOVERNMENT

## 2021-12-05 ENCOUNTER — TELEPHONE (OUTPATIENT)
Dept: OBSTETRICS AND GYNECOLOGY | Facility: CLINIC | Age: 41
End: 2021-12-05
Payer: OTHER GOVERNMENT

## 2021-12-06 DIAGNOSIS — N92.6 IRREGULAR BLEEDING: Primary | ICD-10-CM

## 2021-12-09 ENCOUNTER — TELEPHONE (OUTPATIENT)
Dept: PAIN MEDICINE | Facility: CLINIC | Age: 41
End: 2021-12-09
Payer: OTHER GOVERNMENT

## 2021-12-20 ENCOUNTER — TELEPHONE (OUTPATIENT)
Dept: PSYCHIATRY | Facility: CLINIC | Age: 41
End: 2021-12-20
Payer: OTHER GOVERNMENT

## 2022-01-19 LAB
INFLUENZA A, MOLECULAR: NEGATIVE
INFLUENZA B, MOLECULAR: NEGATIVE
SARS-COV-2 RDRP RESP QL NAA+PROBE: NEGATIVE
SPECIMEN SOURCE: NORMAL

## 2022-01-19 PROCEDURE — 99284 EMERGENCY DEPT VISIT MOD MDM: CPT | Mod: 25

## 2022-01-19 PROCEDURE — 96360 HYDRATION IV INFUSION INIT: CPT

## 2022-01-19 PROCEDURE — 87502 INFLUENZA DNA AMP PROBE: CPT | Performed by: REGISTERED NURSE

## 2022-01-19 PROCEDURE — U0002 COVID-19 LAB TEST NON-CDC: HCPCS | Performed by: REGISTERED NURSE

## 2022-01-20 ENCOUNTER — HOSPITAL ENCOUNTER (EMERGENCY)
Facility: HOSPITAL | Age: 42
Discharge: HOME | End: 2022-01-20
Attending: FAMILY MEDICINE
Payer: OTHER GOVERNMENT

## 2022-01-20 VITALS
WEIGHT: 206 LBS | SYSTOLIC BLOOD PRESSURE: 122 MMHG | RESPIRATION RATE: 16 BRPM | DIASTOLIC BLOOD PRESSURE: 69 MMHG | TEMPERATURE: 97 F | HEART RATE: 70 BPM | HEIGHT: 67 IN | BODY MASS INDEX: 32.33 KG/M2 | OXYGEN SATURATION: 99 %

## 2022-01-20 DIAGNOSIS — B34.9 VIRAL SYNDROME: Primary | ICD-10-CM

## 2022-01-20 DIAGNOSIS — R05.9 COUGH: ICD-10-CM

## 2022-01-20 LAB
ALBUMIN SERPL BCP-MCNC: 4.2 G/DL (ref 3.5–5.2)
ALP SERPL-CCNC: 69 U/L (ref 55–135)
ALT SERPL W/O P-5'-P-CCNC: 32 U/L (ref 10–44)
ANION GAP SERPL CALC-SCNC: 13 MMOL/L (ref 8–16)
AST SERPL-CCNC: 46 U/L (ref 10–40)
BASOPHILS # BLD AUTO: 0.05 K/UL (ref 0–0.2)
BASOPHILS NFR BLD: 0.8 % (ref 0–1.9)
BILIRUB SERPL-MCNC: 0.2 MG/DL (ref 0.1–1)
BILIRUB UR QL STRIP: NEGATIVE
BUN SERPL-MCNC: 15 MG/DL (ref 6–20)
CALCIUM SERPL-MCNC: 9.7 MG/DL (ref 8.7–10.5)
CHLORIDE SERPL-SCNC: 107 MMOL/L (ref 95–110)
CLARITY UR: CLEAR
CO2 SERPL-SCNC: 19 MMOL/L (ref 23–29)
COLOR UR: YELLOW
CREAT SERPL-MCNC: 0.7 MG/DL (ref 0.5–1.4)
DIFFERENTIAL METHOD: ABNORMAL
EOSINOPHIL # BLD AUTO: 0.1 K/UL (ref 0–0.5)
EOSINOPHIL NFR BLD: 0.9 % (ref 0–8)
ERYTHROCYTE [DISTWIDTH] IN BLOOD BY AUTOMATED COUNT: 13.4 % (ref 11.5–14.5)
EST. GFR  (AFRICAN AMERICAN): >60 ML/MIN/1.73 M^2
EST. GFR  (NON AFRICAN AMERICAN): >60 ML/MIN/1.73 M^2
GLUCOSE SERPL-MCNC: 91 MG/DL (ref 70–110)
GLUCOSE UR QL STRIP: NEGATIVE
HCT VFR BLD AUTO: 34.3 % (ref 37–48.5)
HGB BLD-MCNC: 12 G/DL (ref 12–16)
HGB UR QL STRIP: NEGATIVE
IMM GRANULOCYTES # BLD AUTO: 0.01 K/UL (ref 0–0.04)
IMM GRANULOCYTES NFR BLD AUTO: 0.2 % (ref 0–0.5)
KETONES UR QL STRIP: NEGATIVE
LEUKOCYTE ESTERASE UR QL STRIP: NEGATIVE
LYMPHOCYTES # BLD AUTO: 3.3 K/UL (ref 1–4.8)
LYMPHOCYTES NFR BLD: 50.9 % (ref 18–48)
MCH RBC QN AUTO: 31.8 PG (ref 27–31)
MCHC RBC AUTO-ENTMCNC: 35 G/DL (ref 32–36)
MCV RBC AUTO: 91 FL (ref 82–98)
MONOCYTES # BLD AUTO: 0.5 K/UL (ref 0.3–1)
MONOCYTES NFR BLD: 6.9 % (ref 4–15)
NEUTROPHILS # BLD AUTO: 2.7 K/UL (ref 1.8–7.7)
NEUTROPHILS NFR BLD: 40.3 % (ref 38–73)
NITRITE UR QL STRIP: NEGATIVE
NRBC BLD-RTO: 0 /100 WBC
PH UR STRIP: 6 [PH] (ref 5–8)
PLATELET # BLD AUTO: 243 K/UL (ref 150–450)
PMV BLD AUTO: 10.5 FL (ref 9.2–12.9)
POTASSIUM SERPL-SCNC: 4.6 MMOL/L (ref 3.5–5.1)
PROT SERPL-MCNC: 7.7 G/DL (ref 6–8.4)
PROT UR QL STRIP: NEGATIVE
RBC # BLD AUTO: 3.77 M/UL (ref 4–5.4)
SODIUM SERPL-SCNC: 139 MMOL/L (ref 136–145)
SP GR UR STRIP: 1.02 (ref 1–1.03)
URN SPEC COLLECT METH UR: NORMAL
UROBILINOGEN UR STRIP-ACNC: NEGATIVE EU/DL
WBC # BLD AUTO: 6.56 K/UL (ref 3.9–12.7)

## 2022-01-20 PROCEDURE — 85025 COMPLETE CBC W/AUTO DIFF WBC: CPT | Performed by: REGISTERED NURSE

## 2022-01-20 PROCEDURE — 80053 COMPREHEN METABOLIC PANEL: CPT | Performed by: REGISTERED NURSE

## 2022-01-20 PROCEDURE — 25000003 PHARM REV CODE 250: Performed by: REGISTERED NURSE

## 2022-01-20 PROCEDURE — 81003 URINALYSIS AUTO W/O SCOPE: CPT | Performed by: REGISTERED NURSE

## 2022-01-20 RX ORDER — ONDANSETRON 8 MG/1
8 TABLET, ORALLY DISINTEGRATING ORAL
Status: COMPLETED | OUTPATIENT
Start: 2022-01-20 | End: 2022-01-20

## 2022-01-20 RX ORDER — ONDANSETRON 4 MG/1
4 TABLET, FILM COATED ORAL EVERY 6 HOURS
Qty: 12 TABLET | Refills: 0 | Status: SHIPPED | OUTPATIENT
Start: 2022-01-20 | End: 2022-09-01

## 2022-01-20 RX ADMIN — ONDANSETRON 8 MG: 8 TABLET, ORALLY DISINTEGRATING ORAL at 12:01

## 2022-01-20 RX ADMIN — SODIUM CHLORIDE 1000 ML: 0.9 INJECTION, SOLUTION INTRAVENOUS at 12:01

## 2022-01-20 NOTE — Clinical Note
"Brionna"Gabriela Carrion was seen and treated in our emergency department on 1/19/2022.  She may return to work on 01/23/2022.       If you have any questions or concerns, please don't hesitate to call.      Tan Landaverde Jr., FNP"

## 2022-01-20 NOTE — FIRST PROVIDER EVALUATION
Medical screening exam completed.  I have conducted a focused provider triage encounter, findings are as follows:    Brief history of present illness:  Fever, cough and diarrhea    There were no vitals filed for this visit.    Pertinent physical exam:  NAD    Brief workup plan:  Labs and fluids    Preliminary workup initiated; this workup will be continued and followed by the physician or advanced practice provider that is assigned to the patient when roomed.

## 2022-01-20 NOTE — ED PROVIDER NOTES
Encounter Date: 1/19/2022       History     Chief Complaint   Patient presents with    COVID-19 Concerns     Pt presented to ED with c/o multiple COVID symptoms for the past 2-3 days, chills, diarrhea, cough, nausea, and weakness     The history is provided by the patient.   Diarrhea   This is a new problem. The current episode started two days ago. The problem has been unchanged. Associated symptoms include chills, coughing, headaches and a URI. Pertinent negatives include no fever. There are no known risk factors.     Review of patient's allergies indicates:   Allergen Reactions    House dust mite Tinitus     Past Medical History:   Diagnosis Date    Abnormal Pap smear     HPV/ biopsy-- never had a normal pap    Allergy     year around; tried OTC Zyrtec, Allegra, which makes her sleepy; increased water has helped partially    Closed displaced fracture of proximal phalanx of lesser toe of left foot 8/7/2017    COVID-19 virus infection 7/21/2020    Diabetes mellitus     Family history of breast cancer in female     Early breast cancer on mother's side of the family    History of HPV infection 01/01/2002    Initially had abnormal PAPs, except for 2015.    Left ovarian cyst     2 cm per CT abn & Pelvis w/o at [facility] on 12/7/19     Past Surgical History:   Procedure Laterality Date    APPENDECTOMY      BREAST RECONSTRUCTION      augmentation    TONSILLECTOMY       Family History   Problem Relation Age of Onset    Hypertension Mother     Alcohol abuse Mother         Recovered    Depression Mother     Drug abuse Mother         Recovered    Alcohol abuse Father     Drug abuse Father         Active    Breast cancer Maternal Grandmother     Hypertension Maternal Grandmother     Glaucoma Maternal Grandmother     Cataracts Maternal Grandmother     Vision loss Maternal Grandmother         Went blind at 25    Breast cancer Maternal Aunt     Breast cancer Maternal Aunt     Breast cancer Maternal Aunt      Breast cancer Maternal Aunt     Alcohol abuse Sister         Recovered    Drug abuse Sister         Recovered    Ovarian cancer Neg Hx     Stroke Neg Hx      Social History     Tobacco Use    Smoking status: Never Smoker    Smokeless tobacco: Never Used   Substance Use Topics    Alcohol use: Not Currently     Alcohol/week: 0.0 standard drinks     Comment: 2 drinks every 2 weeks    Drug use: Never     Review of Systems   Constitutional: Positive for chills. Negative for fever.   HENT: Negative for sore throat.    Respiratory: Positive for cough. Negative for shortness of breath.    Cardiovascular: Negative for chest pain.   Gastrointestinal: Positive for diarrhea and nausea.   Genitourinary: Negative for dysuria.   Musculoskeletal: Negative for back pain.   Skin: Negative for rash.   Neurological: Positive for headaches. Negative for weakness.   Hematological: Does not bruise/bleed easily.   All other systems reviewed and are negative.      Physical Exam     Initial Vitals [01/19/22 2148]   BP Pulse Resp Temp SpO2   126/69 70 16 97.8 °F (36.6 °C) 97 %      MAP       --         Physical Exam    Constitutional: She appears well-developed and well-nourished. She is not diaphoretic. No distress.   HENT:   Head: Normocephalic and atraumatic.   Eyes: Conjunctivae and EOM are normal. Pupils are equal, round, and reactive to light.   Neck: Neck supple.   Normal range of motion.  Cardiovascular: Normal rate, regular rhythm and normal heart sounds.   No murmur heard.  Pulmonary/Chest: Breath sounds normal. No respiratory distress. She has no wheezes. She has no rales.   Abdominal: Abdomen is soft. Bowel sounds are normal. There is no abdominal tenderness. There is no rebound, no guarding and no CVA tenderness.   Musculoskeletal:         General: No tenderness or edema. Normal range of motion.      Cervical back: Normal range of motion and neck supple.     Neurological: She is alert and oriented to person, place,  and time. No cranial nerve deficit. GCS score is 15. GCS eye subscore is 4. GCS verbal subscore is 5. GCS motor subscore is 6.   Skin: Skin is warm and dry. Capillary refill takes less than 2 seconds.   Psychiatric: She has a normal mood and affect. Thought content normal.         ED Course   Procedures  Labs Reviewed   CBC W/ AUTO DIFFERENTIAL - Abnormal; Notable for the following components:       Result Value    RBC 3.77 (*)     Hematocrit 34.3 (*)     MCH 31.8 (*)     Lymph % 50.9 (*)     All other components within normal limits   COMPREHENSIVE METABOLIC PANEL - Abnormal; Notable for the following components:    CO2 19 (*)     AST 46 (*)     All other components within normal limits   INFLUENZA A & B BY MOLECULAR   SARS-COV-2 RNA AMPLIFICATION, QUAL   URINALYSIS, REFLEX TO URINE CULTURE    Narrative:     Specimen Source->Urine          Imaging Results          X-Ray Chest 1 View (Final result)  Result time 01/19/22 23:40:11    Final result by Brett Shoemaker MD (01/19/22 23:40:11)                 Impression:      Minimal interstitial prominence.  Otherwise no pneumonic consolidation.      Electronically signed by: Navid West  Date:    01/19/2022  Time:    23:40             Narrative:    EXAMINATION:  XR CHEST 1 VIEW    CLINICAL HISTORY:  Cough, unspecified    TECHNIQUE:  Single frontal view of the chest was performed.    COMPARISON:  None    FINDINGS:  Minimal interstitial prominence.The lungs are otherwise clear, with normal appearance of pulmonary vasculature and no pleural effusion or pneumothorax.    The cardiac silhouette is normal in size. The hilar and mediastinal contours are unremarkable.    Bones are intact.                                 Medications   sodium chloride 0.9% bolus 1,000 mL (1,000 mLs Intravenous New Bag 1/20/22 0033)   ondansetron disintegrating tablet 8 mg (8 mg Oral Given 1/20/22 0032)          1:19 AM: Reassessed pt at this time.  Pt states her condition has improved at this time.  Discussed with pt all pertinent ED information and results. Discussed pt dx and plan of tx. Gave pt all f/u and return to the ED instructions. All questions and concerns were addressed at this time. Pt expresses understanding of information and instructions, and is comfortable with plan to discharge. Pt is stable for discharge.    I discussed with patient and/or family/caretaker that evaluation in the ED does not suggest any emergent or life threatening medical conditions requiring immediate intervention beyond what was provided in the ED, and I believe patient is safe for discharge.  Regardless, an unremarkable evaluation in the ED does not preclude the development or presence of a serious of life threatening condition. As such, patient was instructed to return immediately for any worsening or change in current symptoms.                   Clinical Impression:   Final diagnoses:  [R05.9] Cough  [B34.9] Viral syndrome (Primary)          ED Disposition Condition    Discharge Stable        ED Prescriptions     Medication Sig Dispense Start Date End Date Auth. Provider    ondansetron (ZOFRAN) 4 MG tablet Take 1 tablet (4 mg total) by mouth every 6 (six) hours. 12 tablet 1/20/2022  ADRIANA Reeves Jr.        Follow-up Information     Follow up With Specialties Details Why Contact Info    Radha Griffiths MD Family Medicine In 1 week As needed [id number] [place] [id number] [phone]             ADRIANA Reeves Jr.  01/20/22 9701

## 2022-01-24 ENCOUNTER — TELEPHONE (OUTPATIENT)
Dept: PSYCHIATRY | Facility: CLINIC | Age: 42
End: 2022-01-24
Payer: OTHER GOVERNMENT

## 2022-02-15 ENCOUNTER — HOSPITAL ENCOUNTER (EMERGENCY)
Facility: HOSPITAL | Age: 42
Discharge: HOME | End: 2022-02-15
Attending: EMERGENCY MEDICINE
Payer: OTHER GOVERNMENT

## 2022-02-15 ENCOUNTER — OFFICE VISIT (OUTPATIENT)
Dept: FAMILY MEDICINE | Facility: CLINIC | Age: 42
End: 2022-02-15
Payer: OTHER GOVERNMENT

## 2022-02-15 VITALS
SYSTOLIC BLOOD PRESSURE: 112 MMHG | HEART RATE: 72 BPM | DIASTOLIC BLOOD PRESSURE: 72 MMHG | HEIGHT: 67 IN | RESPIRATION RATE: 16 BRPM | OXYGEN SATURATION: 99 % | WEIGHT: 209 LBS | TEMPERATURE: 98 F | BODY MASS INDEX: 32.8 KG/M2

## 2022-02-15 DIAGNOSIS — M79.18 MUSCULOSKELETAL PAIN: Primary | ICD-10-CM

## 2022-02-15 DIAGNOSIS — S39.012A STRAIN OF LUMBAR REGION, INITIAL ENCOUNTER: Primary | ICD-10-CM

## 2022-02-15 PROCEDURE — 99284 EMERGENCY DEPT VISIT MOD MDM: CPT | Mod: 25

## 2022-02-15 PROCEDURE — 99213 PR OFFICE/OUTPT VISIT, EST, LEVL III, 20-29 MIN: ICD-10-PCS | Mod: 95,,, | Performed by: REGISTERED NURSE

## 2022-02-15 PROCEDURE — 99213 OFFICE O/P EST LOW 20 MIN: CPT | Mod: 95,,, | Performed by: REGISTERED NURSE

## 2022-02-15 PROCEDURE — 63600175 PHARM REV CODE 636 W HCPCS: Performed by: EMERGENCY MEDICINE

## 2022-02-15 PROCEDURE — 25000003 PHARM REV CODE 250: Performed by: EMERGENCY MEDICINE

## 2022-02-15 PROCEDURE — 96372 THER/PROPH/DIAG INJ SC/IM: CPT

## 2022-02-15 RX ORDER — CYCLOBENZAPRINE HCL 10 MG
10 TABLET ORAL
Status: COMPLETED | OUTPATIENT
Start: 2022-02-15 | End: 2022-02-15

## 2022-02-15 RX ORDER — HYDROMORPHONE HYDROCHLORIDE 1 MG/ML
1 INJECTION, SOLUTION INTRAMUSCULAR; INTRAVENOUS; SUBCUTANEOUS
Status: COMPLETED | OUTPATIENT
Start: 2022-02-15 | End: 2022-02-15

## 2022-02-15 RX ORDER — CYCLOBENZAPRINE HCL 10 MG
5 TABLET ORAL 3 TIMES DAILY PRN
Qty: 15 TABLET | Refills: 0 | Status: SHIPPED | OUTPATIENT
Start: 2022-02-15 | End: 2022-02-24 | Stop reason: ALTCHOICE

## 2022-02-15 RX ORDER — HYDROCODONE BITARTRATE AND ACETAMINOPHEN 10; 325 MG/1; MG/1
1 TABLET ORAL EVERY 6 HOURS PRN
Qty: 9 TABLET | Refills: 0 | Status: SHIPPED | OUTPATIENT
Start: 2022-02-15 | End: 2022-02-24 | Stop reason: ALTCHOICE

## 2022-02-15 RX ADMIN — HYDROMORPHONE HYDROCHLORIDE 1 MG: 1 INJECTION, SOLUTION INTRAMUSCULAR; INTRAVENOUS; SUBCUTANEOUS at 04:02

## 2022-02-15 RX ADMIN — CYCLOBENZAPRINE HYDROCHLORIDE 10 MG: 10 TABLET, FILM COATED ORAL at 04:02

## 2022-02-15 NOTE — DISCHARGE INSTRUCTIONS
Ibuprofen 3 times daily for pain.  Flexeril as a muscle relaxer.  Norco for breakthrough pain.  Do not drive or operate equipment machinery while taking Norco Flexeril as make you fatigued but you others risk.  Follow up with her doctor 1-2 days for re-evaluation.  No heavy lifting for 1 week.  Return as needed for any worsening symptoms, problems, questions or concerns.

## 2022-02-15 NOTE — Clinical Note
"Brionna Benjamin" Murali was seen and treated in our emergency department on 2/15/2022.  She may return to work on 02/16/2022.       If you have any questions or concerns, please don't hesitate to call.      Radha Gonzales RN    "

## 2022-02-15 NOTE — PROGRESS NOTES
PRIMARY CARE TELEMEDICINE NOTE      Patient location:  [place]  Visit type: Virtual visit with synchronous audio and video  Each patient to whom he or she provides medical services by telemedicine is:  (1) informed of the relationship between the physician and patient and the respective role of any other health care provider with respect to management of the patient  (2) notified that he or she may decline to receive medical services by telemedicine and may withdraw from such care at any time        SUBJECTIVE     Patient ID: Brionna Carrion is a 41 y.o. female.      CHIEF COMPLAINT:  LT HIP and BACK PAIN      HPI    Brionna is seen virtually today for left hip and back pain.  She thinks may have been MSK issues after running yesterday.  Seen at ER yesterday, feeling better today.  ER tx with injection, Flexeril and Norco.  Today, muscle spasms stopped.      Review of patient's allergies indicates:   Allergen Reactions    House dust mite Tinitus         Patient Active Problem List    Diagnosis Date Noted    EKG abnormalities 11/19/2021    PAC (premature atrial contraction) 11/19/2021    Chest pain at rest 11/10/2021    Irregular bleeding 11/10/2021    Stress 11/10/2021    Dyspnea 10/12/2020    Seasonal allergies 09/22/2020    History of 2019 novel coronavirus disease (COVID-19) 09/22/2020    Sebaceous cyst 09/22/2020    Vision impairment 09/22/2020    Contraceptive management 09/22/2020    Family history of breast cancer in female            Current Outpatient Medications:     azelastine (ASTELIN) 137 mcg (0.1 %) nasal spray, 1 spray (137 mcg total) by Nasal route 2 (two) times daily., Disp: 20 mL, Rfl: 5    cyclobenzaprine (FLEXERIL) 10 MG tablet, Take 0.5 tablets (5 mg total) by mouth 3 (three) times daily as needed for Muscle spasms., Disp: 15 tablet, Rfl: 0    etonogestreL-ethinyl estradioL (NUVARING) 0.12-0.015 mg/24 hr vaginal ring, Insert one ring vaginally and remove and insert new ring once a month,  Disp: 1 each, Rfl: 11    fluticasone propionate (FLONASE) 50 mcg/actuation nasal spray, 1 spray (50 mcg total) by Each Nostril route once daily., Disp: 20 mL, Rfl: 5    HYDROcodone-acetaminophen (NORCO)  mg per tablet, Take 1 tablet by mouth every 6 (six) hours as needed for Pain., Disp: 9 tablet, Rfl: 0    ondansetron (ZOFRAN) 4 MG tablet, Take 1 tablet (4 mg total) by mouth every 6 (six) hours., Disp: 12 tablet, Rfl: 0    PROAIR HFA 90 mcg/actuation inhaler, INHALE 2 PUFFS INTO THE LUNGS Q 4 HOURS PRN FOR SOB OR WHEEZING, Disp: , Rfl:   No current facility-administered medications for this visit.      Past Medical History:   Diagnosis Date    Abnormal Pap smear     HPV/ biopsy-- never had a normal pap    Allergy     year around; tried OTC Zyrtec, Allegra, which makes her sleepy; increased water has helped partially    Closed displaced fracture of proximal phalanx of lesser toe of left foot 8/7/2017    COVID-19 virus infection 7/21/2020    Diabetes mellitus     Family history of breast cancer in female     Early breast cancer on mother's side of the family    History of HPV infection 01/01/2002    Initially had abnormal PAPs, except for 2015.    Left ovarian cyst     2 cm per CT abn & Pelvis w/o at [facility] on 12/7/19         Past Surgical History:   Procedure Laterality Date    APPENDECTOMY      BREAST RECONSTRUCTION      augmentation    TONSILLECTOMY           Family History   Problem Relation Age of Onset    Hypertension Mother     Alcohol abuse Mother         Recovered    Depression Mother     Drug abuse Mother         Recovered    Alcohol abuse Father     Drug abuse Father         Active    Breast cancer Maternal Grandmother     Hypertension Maternal Grandmother     Glaucoma Maternal Grandmother     Cataracts Maternal Grandmother     Vision loss Maternal Grandmother         Went blind at 24    Breast cancer Maternal Aunt     Breast cancer Maternal Aunt     Breast cancer Maternal  Aunt     Breast cancer Maternal Aunt     Alcohol abuse Sister         Recovered    Drug abuse Sister         Recovered    Ovarian cancer Neg Hx     Stroke Neg Hx          Social History     Tobacco Use    Smoking status: Never Smoker    Smokeless tobacco: Never Used   Substance Use Topics    Alcohol use: Not Currently     Alcohol/week: 0.0 standard drinks     Comment: 2 drinks every 2 weeks    Drug use: Never         REVIEW OF SYSTEMS  Review of Systems   Constitutional: Negative for fever.   Cardiovascular: Negative for chest pain.   Gastrointestinal: Positive for abdominal pain.   Genitourinary: Negative for dysuria, hematuria and pelvic pain.   Musculoskeletal: Positive for back pain.   Neurological: Positive for weakness and headaches. Negative for numbness.          OBJECTIVE:  Physical Exam:  Constitutional:  In NAD, pleasant, cooperative.  Appears well-developed and well-nourished.   Respiratory:  Unlabored, in no resp distress.  Neurological: Alert and oriented to person, place, and time.   Psychiatric: Normal mood and affect, behavior is normal. Judgment and thought content normal.       Complete PE deferred due to video visit      ASSESSMENT       1. Musculoskeletal pain           PLAN    Musculoskeletal pain  · Acute onset yesterday after running, feeling better today.  · Continue ER meds and instructions provided.  · Home pain relief and care discussed.  · To PT if needed.        Follow-up:  Contact office back as needed.          Total time spent with patient was 15 minutes to include discussion, counseling and chart review.  All patient questions were answered at appointment.

## 2022-02-15 NOTE — ED PROVIDER NOTES
"SCRIBE #1 NOTE: I, Carin Harkins, am scribing for, and in the presence of, Bin Lozano Jr., MD. I have scribed the entire note.       History     Chief Complaint   Patient presents with    Back Pain     Pt reports "back spasm since 10pm" denies any injury     Review of patient's allergies indicates:   Allergen Reactions    House dust mite Tinitus         History of Present Illness     HPI    2/15/2022, 3:57 AM  History obtained from the patient      History of Present Illness: Brionna Carrion is a 41 y.o. female patient with a PMHx of DM who presents to the Emergency Department for evaluation of lower back pain and muscle spasm which onset gradually since 10 PM. Symptoms are constant and moderate in severity. No mitigating or exacerbating factors reported. No associated sxs reported. Patient denies any numbness, weakness, bladder incontinence, bowel incontinence, saddle anesthesia, gait problem, and all other sxs at this time. No prior tx reported. No further complaints or concerns at this time.  No bowel or bladder dysfunction.  No saddle anesthesia.      Arrival mode: Personal vehicle    PCP: Radha Griffiths MD        Past Medical History:  Past Medical History:   Diagnosis Date    Abnormal Pap smear     HPV/ biopsy-- never had a normal pap    Allergy     year around; tried OTC Zyrtec, Allegra, which makes her sleepy; increased water has helped partially    Closed displaced fracture of proximal phalanx of lesser toe of left foot 8/7/2017    COVID-19 virus infection 7/21/2020    Diabetes mellitus     Family history of breast cancer in female     Early breast cancer on mother's side of the family    History of HPV infection 01/01/2002    Initially had abnormal PAPs, except for 2015.    Left ovarian cyst     2 cm per CT abn & Pelvis w/o at [facility] on 12/7/19       Past Surgical History:  Past Surgical History:   Procedure Laterality Date    APPENDECTOMY      BREAST RECONSTRUCTION      augmentation    " TONSILLECTOMY           Family History:  Family History   Problem Relation Age of Onset    Hypertension Mother     Alcohol abuse Mother         Recovered    Depression Mother     Drug abuse Mother         Recovered    Alcohol abuse Father     Drug abuse Father         Active    Breast cancer Maternal Grandmother     Hypertension Maternal Grandmother     Glaucoma Maternal Grandmother     Cataracts Maternal Grandmother     Vision loss Maternal Grandmother         Went blind at 25    Breast cancer Maternal Aunt     Breast cancer Maternal Aunt     Breast cancer Maternal Aunt     Breast cancer Maternal Aunt     Alcohol abuse Sister         Recovered    Drug abuse Sister         Recovered    Ovarian cancer Neg Hx     Stroke Neg Hx        Social History:  Social History     Tobacco Use    Smoking status: Never Smoker    Smokeless tobacco: Never Used   Substance and Sexual Activity    Alcohol use: Not Currently     Alcohol/week: 0.0 standard drinks     Comment: 2 drinks every 2 weeks    Drug use: Never    Sexual activity: Yes     Partners: Male     Birth control/protection: None     Comment: My  is fixed        Review of Systems     Review of Systems   Constitutional: Negative for fever.   HENT: Negative for sore throat.    Respiratory: Negative for shortness of breath.    Cardiovascular: Negative for chest pain.   Gastrointestinal: Negative for nausea.   Genitourinary: Negative for dysuria.   Musculoskeletal: Positive for back pain (lower). Negative for gait problem.        + lower back muscle spasms   Skin: Negative for rash.   Neurological: Negative for weakness and numbness.        - bowel/bladder incontinence       - saddle anesthesia   Hematological: Does not bruise/bleed easily.   All other systems reviewed and are negative.     Physical Exam     Initial Vitals [02/15/22 0347]   BP Pulse Resp Temp SpO2   105/66 83 16 97.6 °F (36.4 °C) (!) 94 %      MAP       --          Physical  "Exam  Nursing Notes and Vital Signs Reviewed.  Nursing Notes and Vital Signs Reviewed.  Constitutional: Patient is in no acute distress. Well-developed and well-nourished.  Head: Atraumatic. Normocephalic.  Eyes:  EOM intact.  No scleral icterus.  ENT: Mucous membranes are moist.  Nares clear   Neck:  Full ROM. No JVD.  Cardiovascular: Regular rate. Regular rhythm No murmurs, rubs, or gallops. Distal pulses are 2+ and symmetric  Pulmonary/Chest: No respiratory distress. Clear to auscultation bilaterally. No wheezing or rales.  Equal chest wall rise bilaterally  Abdominal: Soft and non-distended.  There is no tenderness.  No rebound, guarding, or rigidity. Good bowel sounds.  Genitourinary: No CVA tenderness.  No suprapubic tenderness  Musculoskeletal: Moves all extremities. No obvious deformities.  5 x 5 strength in all extremities there is no point C/T/L/S tenderness.  Normal spinal curvature.  Pelvis stable nontender.  With door with normal gait.  Pain is worse with standing upward  Skin: Warm and dry.  Neurological:  Alert, awake, and appropriate.  Normal speech.  No acute focal neurological deficits are appreciated.  Two through 12 intact bilaterally.  Psychiatric: Normal affect. Good eye contact. Appropriate in content.         ED Course   Procedures  ED Vital Signs:  Vitals:    02/15/22 0347 02/15/22 0406 02/15/22 0407   BP: 105/66  109/67   Pulse: 83  86   Resp: 16 14 16   Temp: 97.6 °F (36.4 °C)     TempSrc: Oral     SpO2: (!) 94%  100%   Weight: 94.8 kg (208 lb 15.9 oz)     Height: 5' 7" (1.702 m)         Abnormal Lab Results:  Labs Reviewed - No data to display     All Lab Results:  none    Imaging Results:  Imaging Results    None                The Emergency Provider reviewed the vital signs and test results, which are outlined above.     ED Discussion     4:32 AM: Reassessed pt at this time. Discussed with pt all pertinent ED information and results. Discussed pt dx and plan of tx. Gave pt all f/u and " return to the ED instructions. All questions and concerns were addressed at this time. Pt expresses understanding of information and instructions, and is comfortable with plan to discharge. Pt is stable for discharge.    I discussed with patient and/or family/caretaker that evaluation in the ED does not suggest any emergent or life threatening medical conditions requiring immediate intervention beyond what was provided in the ED, and I believe patient is safe for discharge.  Regardless, an unremarkable evaluation in the ED does not preclude the development or presence of a serious of life threatening condition. As such, patient was instructed to return immediately for any worsening or change in current symptoms.     4:35 AM  Patient is stable nontoxic.  Patient has lower back pain worse with movement.  No bowel or bladder dysfunction.  Pain is now well controlled.  The patient is being discharged home with Fernando.  Discussed with her all findings well as plan of care.  Will treat with ibuprofen, Flexeril and Norco with close follow-up.  She has been given precautions with Flexeril and Norco.  She verbalized agreement understanding with all instructions seems reliable.  She is safe for discharge my opinion.            ED Medication(s):  Medications   HYDROmorphone injection 1 mg (1 mg Intramuscular Given 2/15/22 0406)   cyclobenzaprine tablet 10 mg (10 mg Oral Given 2/15/22 0406)       New Prescriptions    No medications on file               Scribe Attestation:   Scribe #1: I performed the above scribed service and the documentation accurately describes the services I performed. I attest to the accuracy of the note.     Attending:   Physician Attestation Statement for Scribe #1: I, Bin Lozano Jr., MD, personally performed the services described in this documentation, as scribed by Carin Harkins, in my presence, and it is both accurate and complete.           Clinical Impression     No diagnosis  found.    Disposition:   Disposition: Discharged  Condition: Stable         Bin Lozano Jr., MD  02/15/22 0432

## 2022-02-23 DIAGNOSIS — J45.909 ASTHMA, UNSPECIFIED ASTHMA SEVERITY, UNSPECIFIED WHETHER COMPLICATED, UNSPECIFIED WHETHER PERSISTENT: Primary | ICD-10-CM

## 2022-02-24 ENCOUNTER — HOSPITAL ENCOUNTER (OUTPATIENT)
Dept: RADIOLOGY | Facility: HOSPITAL | Age: 42
Discharge: HOME | End: 2022-02-24
Attending: FAMILY MEDICINE
Payer: OTHER GOVERNMENT

## 2022-02-24 ENCOUNTER — OFFICE VISIT (OUTPATIENT)
Dept: INTERNAL MEDICINE | Facility: CLINIC | Age: 42
End: 2022-02-24
Payer: OTHER GOVERNMENT

## 2022-02-24 VITALS
OXYGEN SATURATION: 97 % | HEART RATE: 100 BPM | HEIGHT: 67 IN | BODY MASS INDEX: 32.11 KG/M2 | RESPIRATION RATE: 18 BRPM | WEIGHT: 204.56 LBS | TEMPERATURE: 98 F | DIASTOLIC BLOOD PRESSURE: 76 MMHG | SYSTOLIC BLOOD PRESSURE: 118 MMHG

## 2022-02-24 DIAGNOSIS — M25.571 ACUTE RIGHT ANKLE PAIN: ICD-10-CM

## 2022-02-24 DIAGNOSIS — M54.6 CHRONIC LEFT-SIDED THORACIC BACK PAIN: ICD-10-CM

## 2022-02-24 DIAGNOSIS — G89.29 CHRONIC LEFT-SIDED THORACIC BACK PAIN: ICD-10-CM

## 2022-02-24 DIAGNOSIS — G89.29 CHRONIC NECK PAIN: ICD-10-CM

## 2022-02-24 DIAGNOSIS — M54.2 CHRONIC NECK PAIN: ICD-10-CM

## 2022-02-24 DIAGNOSIS — M79.672 PAIN OF LEFT HEEL: Primary | ICD-10-CM

## 2022-02-24 PROCEDURE — 72040 X-RAY EXAM NECK SPINE 2-3 VW: CPT | Mod: TC

## 2022-02-24 PROCEDURE — 99999 PR PBB SHADOW E&M-EST. PATIENT-LVL V: ICD-10-PCS | Mod: PBBFAC,,, | Performed by: FAMILY MEDICINE

## 2022-02-24 PROCEDURE — 99214 OFFICE O/P EST MOD 30 MIN: CPT | Mod: S$PBB,,, | Performed by: FAMILY MEDICINE

## 2022-02-24 PROCEDURE — 99214 PR OFFICE/OUTPT VISIT, EST, LEVL IV, 30-39 MIN: ICD-10-PCS | Mod: S$PBB,,, | Performed by: FAMILY MEDICINE

## 2022-02-24 PROCEDURE — 72070 XR THORACIC SPINE AP LATERAL: ICD-10-PCS | Mod: 26,,, | Performed by: RADIOLOGY

## 2022-02-24 PROCEDURE — 99215 OFFICE O/P EST HI 40 MIN: CPT | Mod: PBBFAC | Performed by: FAMILY MEDICINE

## 2022-02-24 PROCEDURE — 72070 X-RAY EXAM THORAC SPINE 2VWS: CPT | Mod: 26,,, | Performed by: RADIOLOGY

## 2022-02-24 PROCEDURE — 72040 XR CERVICAL SPINE AP LATERAL: ICD-10-PCS | Mod: 26,,, | Performed by: RADIOLOGY

## 2022-02-24 PROCEDURE — 72070 X-RAY EXAM THORAC SPINE 2VWS: CPT | Mod: TC

## 2022-02-24 PROCEDURE — 72040 X-RAY EXAM NECK SPINE 2-3 VW: CPT | Mod: 26,,, | Performed by: RADIOLOGY

## 2022-02-24 PROCEDURE — 99999 PR PBB SHADOW E&M-EST. PATIENT-LVL V: CPT | Mod: PBBFAC,,, | Performed by: FAMILY MEDICINE

## 2022-02-24 NOTE — PROGRESS NOTES
Subjective:       Patient ID: Brionna Carrion is a 41 y.o. female.    Chief Complaint: Back Pain, Knee Pain, Referral, and Foot Pain    Patient presents to clinic today to discuss some issues and get appropriate referrals.     Left foot pain, plantar aspect of heel, sharp in nature, x 6 weeks. Waxes and wanes, 4/10 today.  Right anterior ankle pain x a few weeks, she is walking more as the is unable to run due to heel pain.  Reports one leg feels shorter than the other.  Chronic neck and back pain x years. Concerns about scoliosis. Has not had this evaluated.     Review of Systems   Constitutional: Positive for activity change and unexpected weight change.   HENT: Positive for hearing loss and rhinorrhea. Negative for trouble swallowing.    Eyes: Positive for visual disturbance. Negative for discharge.   Respiratory: Positive for chest tightness and wheezing.    Cardiovascular: Negative for chest pain and palpitations.   Gastrointestinal: Positive for constipation. Negative for blood in stool, diarrhea and vomiting.   Endocrine: Positive for polydipsia. Negative for polyuria.   Genitourinary: Positive for menstrual problem. Negative for difficulty urinating, dysuria and hematuria.   Musculoskeletal: Positive for arthralgias, joint swelling and neck pain.   Neurological: Positive for weakness and headaches.   Psychiatric/Behavioral: Positive for dysphoric mood.         Objective:      Physical Exam  Vitals reviewed.   Constitutional:       General: She is not in acute distress.     Appearance: She is well-developed.   HENT:      Head: Normocephalic and atraumatic.   Eyes:      General: Lids are normal. No scleral icterus.     Extraocular Movements: Extraocular movements intact.      Conjunctiva/sclera: Conjunctivae normal.      Pupils: Pupils are equal, round, and reactive to light.   Pulmonary:      Effort: Pulmonary effort is normal.   Neurological:      Mental Status: She is alert and oriented to person, place, and  time.      Cranial Nerves: No cranial nerve deficit.      Gait: Gait normal.   Psychiatric:         Mood and Affect: Mood and affect normal.         Assessment:       1. Pain of left heel    2. Acute right ankle pain    3. Chronic neck pain    4. Chronic left-sided thoracic back pain        Plan:     Problem List Items Addressed This Visit     Acute right ankle pain    Relevant Orders    Ambulatory referral/consult to Podiatry    Chronic left-sided thoracic back pain    Relevant Orders    X-Ray Thoracic Spine AP Lateral    Chronic neck pain    Relevant Orders    X-Ray Cervical Spine AP And Lateral    Pain of left heel - Primary    Relevant Orders    Ambulatory referral/consult to Podiatry        Scheduled to see pulmonology, but has to reschedule due to insurance issue.  Scheduled to see Mrs. Hopkins, Psychiatry in March.  Advised against phentermine for weight loss. Advised to have pain issues and mental health addressed prior to worrying about weight loss efforts.  Health Maintenance reviewed/updated.  Discussed eligibility for covid booster, given scheduling information.  She will check on date of last Tdap and send for records.

## 2022-02-24 NOTE — PATIENT INSTRUCTIONS
Try 3-5 minutes daily of guided meditation  [employer] - has free 10 day introduction  Simply Being  Calm  Simple Habit     Moderna and Pfizer vaccine booster shots are approved for adults at increased risk at least six months after their initial immunization and Philipp & Philipp (J&J) COVID-19 vaccine booster shots are approved for all adults at least two months after their initial immunization.    Booster doses for all three COVID-19 vaccines, Pfizer, Moderna and Philipp & Philipp, are now available to the public and are being administered at [facility] vaccination locations.     If you received an mRNA vaccine (Pfizer or Moderna) it is recommended that you receive the same booster dose as your original vaccine series. However, all patients eligible for a booster dose may decide to mix and match your booster dose.     Below are the current mix and match options [facility] currently offers:    Pfizer Vaccine Recipients:  Booster Dose 6 months following 2nd dose can be, in order of recommendation:  Pfizer Booster Dose,  Moderna Booster Dose, or  Philipp & Philipp Booster Dose    Philipp & Philipp Vaccine Recipients:   Booster Dose 2 months following initial dose can be, in order of recommendation:  Pfizer Booster Dose,  Moderna Booster Dose, or  Philipp & Philipp Booster Dose    Both Pfizer and Philipp & Philipp boosters have the same dosage as the original vaccine regimen.    Moderna Vaccine Recipients:   Booster Dose 6 months following 2nd dose can be, in order of recommendation:  Moderna Booster Dose,  Pfizer Booster Dose, or  Philipp & Philipp Booster Dose    The Moderna booster is half the dosage of the original two-dose Moderna series, and [facility] will be following this guidance as outlined by the FDA and CDC.    International patients who have received a non-[place] approved COVID-19 vaccine are eligible for either a Pfizer booster dose or a Philipp & Philipp booster dose 28 days following their  original vaccine dose.     Please schedule your appointment via [facility] or by calling [phone]. Walk-ins will also be accepted as supply allows.    To learn more about COVID-19 vaccination and community vaccine locations, please visit [email or web address].

## 2022-02-25 ENCOUNTER — PATIENT MESSAGE (OUTPATIENT)
Dept: INTERNAL MEDICINE | Facility: CLINIC | Age: 42
End: 2022-02-25
Payer: OTHER GOVERNMENT

## 2022-02-25 ENCOUNTER — PATIENT OUTREACH (OUTPATIENT)
Dept: ADMINISTRATIVE | Facility: OTHER | Age: 42
End: 2022-02-25
Payer: OTHER GOVERNMENT

## 2022-02-25 DIAGNOSIS — G89.29 CHRONIC NECK PAIN: Primary | ICD-10-CM

## 2022-02-25 DIAGNOSIS — M54.2 CHRONIC NECK PAIN: Primary | ICD-10-CM

## 2022-02-25 NOTE — PROGRESS NOTES
Health Maintenance Due   Topic Date Due    TETANUS VACCINE  Never done    Mammogram  Never done    COVID-19 Vaccine (3 - Booster for Pfizer series) 02/17/2022     Updates were requested from care everywhere.  Chart was reviewed for overdue Proactive [facility] Encounters ([facility]) topics (CRS, Breast Cancer Screening, Eye exam)  Health Maintenance has been updated.  LINKS immunization registry triggered.  Immunizations were reconciled.

## 2022-04-04 ENCOUNTER — PATIENT MESSAGE (OUTPATIENT)
Dept: INTERNAL MEDICINE | Facility: CLINIC | Age: 42
End: 2022-04-04
Payer: OTHER GOVERNMENT

## 2022-04-11 ENCOUNTER — PATIENT MESSAGE (OUTPATIENT)
Dept: RESEARCH | Facility: HOSPITAL | Age: 42
End: 2022-04-11
Payer: OTHER GOVERNMENT

## 2022-04-25 ENCOUNTER — PATIENT MESSAGE (OUTPATIENT)
Dept: OPHTHALMOLOGY | Facility: CLINIC | Age: 42
End: 2022-04-25

## 2022-04-25 ENCOUNTER — PATIENT OUTREACH (OUTPATIENT)
Dept: ADMINISTRATIVE | Facility: OTHER | Age: 42
End: 2022-04-25
Payer: OTHER GOVERNMENT

## 2022-04-25 ENCOUNTER — OFFICE VISIT (OUTPATIENT)
Dept: OPHTHALMOLOGY | Facility: CLINIC | Age: 42
End: 2022-04-25
Payer: OTHER GOVERNMENT

## 2022-04-25 DIAGNOSIS — H52.03 HYPEROPIA, BILATERAL: ICD-10-CM

## 2022-04-25 DIAGNOSIS — E11.9 DIABETES MELLITUS TYPE 2 WITHOUT RETINOPATHY: Primary | ICD-10-CM

## 2022-04-25 PROCEDURE — 99999 PR PBB SHADOW E&M-EST. PATIENT-LVL II: ICD-10-PCS | Mod: PBBFAC,,, | Performed by: OPTOMETRIST

## 2022-04-25 PROCEDURE — 99999 PR PBB SHADOW E&M-EST. PATIENT-LVL II: CPT | Mod: PBBFAC,,, | Performed by: OPTOMETRIST

## 2022-04-25 PROCEDURE — 92014 COMPRE OPH EXAM EST PT 1/>: CPT | Mod: S$PBB,,, | Performed by: OPTOMETRIST

## 2022-04-25 PROCEDURE — 99212 OFFICE O/P EST SF 10 MIN: CPT | Mod: PBBFAC | Performed by: OPTOMETRIST

## 2022-04-25 PROCEDURE — 92014 PR EYE EXAM, EST PATIENT,COMPREHESV: ICD-10-PCS | Mod: S$PBB,,, | Performed by: OPTOMETRIST

## 2022-04-25 PROCEDURE — 92015 DETERMINE REFRACTIVE STATE: CPT | Mod: ,,, | Performed by: OPTOMETRIST

## 2022-04-25 PROCEDURE — 92015 PR REFRACTION: ICD-10-PCS | Mod: ,,, | Performed by: OPTOMETRIST

## 2022-04-25 NOTE — PROGRESS NOTES
HPI     NIDDM exam  Blurred vision right eye  at near and distance.  Last eye exam 10/02/2020 TRF.  No results found for: LABA1C, HGBA1C        Last edited by Melissa Nieves MA on 4/25/2022  8:18 AM. (History)            Assessment /Plan     For exam results, see Encounter Report.    Diabetes mellitus type 2 without retinopathy    Hyperopia, bilateral      No Background Diabetic Retinopathy    Dispense Final Rx for glasses.  RTC 1 year  Discussed above and answered questions.

## 2022-04-25 NOTE — LETTER
April 25, 2022    Brionna Carrion  [address]  [facility] [address]             [facility]  Ophthalmology  [address] [place] [id number]  Phone: [phone]  Fax: [phone]   April 25, 2022     Patient: Brionna Carrion   YOB: 1980   Date of Visit: 4/25/2022       To Whom it May Concern:    Brionna Carrion was seen in my clinic on 4/25/2022. She may return to work on 4/25/2022.    Please excuse her from any work missed.  Her eyes are dilated so near vision will be blurry and light sensitive for several hours.    If you have any questions or concerns, please don't hesitate to call.    Sincerely,             Anuel Shankar, OD

## 2022-05-02 ENCOUNTER — PATIENT MESSAGE (OUTPATIENT)
Dept: FAMILY MEDICINE | Facility: CLINIC | Age: 42
End: 2022-05-02
Payer: OTHER GOVERNMENT

## 2022-08-08 ENCOUNTER — TELEPHONE (OUTPATIENT)
Dept: SLEEP MEDICINE | Facility: CLINIC | Age: 42
End: 2022-08-08
Payer: OTHER GOVERNMENT

## 2022-08-08 ENCOUNTER — PATIENT MESSAGE (OUTPATIENT)
Dept: PSYCHIATRY | Facility: CLINIC | Age: 42
End: 2022-08-08
Payer: OTHER GOVERNMENT

## 2022-08-08 NOTE — TELEPHONE ENCOUNTER
Staff call pt to conf. appt pt. Stated I though it was Wed. Pt. Stated shes going in her pt. Portal to see what else is available.

## 2022-08-09 ENCOUNTER — TELEPHONE (OUTPATIENT)
Dept: SLEEP MEDICINE | Facility: CLINIC | Age: 42
End: 2022-08-09
Payer: OTHER GOVERNMENT

## 2022-08-30 ENCOUNTER — TELEPHONE (OUTPATIENT)
Dept: PSYCHIATRY | Facility: CLINIC | Age: 42
End: 2022-08-30
Payer: OTHER GOVERNMENT

## 2022-09-01 ENCOUNTER — OFFICE VISIT (OUTPATIENT)
Dept: PSYCHIATRY | Facility: CLINIC | Age: 42
End: 2022-09-01
Payer: OTHER GOVERNMENT

## 2022-09-01 VITALS — SYSTOLIC BLOOD PRESSURE: 106 MMHG | DIASTOLIC BLOOD PRESSURE: 69 MMHG | HEART RATE: 69 BPM

## 2022-09-01 DIAGNOSIS — F51.05 INSOMNIA DUE TO MENTAL DISORDER: ICD-10-CM

## 2022-09-01 DIAGNOSIS — F90.2 ATTENTION DEFICIT HYPERACTIVITY DISORDER (ADHD), COMBINED TYPE: ICD-10-CM

## 2022-09-01 DIAGNOSIS — F31.60 BIPOLAR AFFECTIVE DISORDER, CURRENT EPISODE MIXED, WITHOUT PSYCHOTIC FEATURES: Primary | ICD-10-CM

## 2022-09-01 PROCEDURE — 99212 OFFICE O/P EST SF 10 MIN: CPT | Mod: PBBFAC | Performed by: PSYCHOLOGIST

## 2022-09-01 PROCEDURE — 99205 OFFICE O/P NEW HI 60 MIN: CPT | Mod: S$PBB,,, | Performed by: PSYCHOLOGIST

## 2022-09-01 PROCEDURE — 99999 PR PBB SHADOW E&M-EST. PATIENT-LVL II: ICD-10-PCS | Mod: PBBFAC,,, | Performed by: PSYCHOLOGIST

## 2022-09-01 PROCEDURE — 99205 PR OFFICE/OUTPT VISIT, NEW, LEVL V, 60-74 MIN: ICD-10-PCS | Mod: S$PBB,,, | Performed by: PSYCHOLOGIST

## 2022-09-01 PROCEDURE — 99999 PR PBB SHADOW E&M-EST. PATIENT-LVL II: CPT | Mod: PBBFAC,,, | Performed by: PSYCHOLOGIST

## 2022-09-01 RX ORDER — LAMOTRIGINE 25 MG/1
TABLET ORAL
Qty: 204 TABLET | Refills: 0 | Status: SHIPPED | OUTPATIENT
Start: 2022-09-01 | End: 2022-11-23

## 2022-09-01 RX ORDER — TRAZODONE HYDROCHLORIDE 50 MG/1
25-50 TABLET ORAL NIGHTLY PRN
Qty: 30 TABLET | Refills: 2 | Status: SHIPPED | OUTPATIENT
Start: 2022-09-01 | End: 2022-12-06 | Stop reason: SDUPTHER

## 2022-09-01 NOTE — PROGRESS NOTES
"PSYCHIATRIC EVALUATION     Disclaimer: Evaluation and treatment is based on information presented to date. Any new information may affect assessment and findings.     Name: Brionna Carrion  Age: 41 y.o.  : 1980    Preferred Name: Brionna    Referring provider: Anupam Paul LCSW    Chief Complaint:  Depression; mood instability    History of Present Illness:   Pt reports experiencing significant mood lability for the past year with sad mood, insomnia, feelings of worthlessness, and passive SI that fluctuate with episodes of pressured speech, increased energy, and impulsive behavior with poor decision-making (e.g., excessive spending), and feeling "high." This first of these episodes occurred in her teens. Pt states she has been depressed her "whole life." She has at least 3 past psychiatric hospitalizations for SI and NSSI (cutting) at ages 15, 16, and 17. She does not endorse current NSSI. Pt reports she was always a social drinker but has been drinking 3-4 glasses of wine each night for the past 3 or so months to help with her anxiety and racing thoughts.     She endorses childhood trauma and trauma that occurred during her  experience. She now has weekly trauma-related flashbacks, though she believes these may be memories only vs reliving, hypervigilance, and some cue avoidance. Pt was diagnosed with ADHD as a child when she was hyperactive and inattentive. She is currently seeing a therapist. She had a telehealth appointment with a psychiatrist and was prescribed an atypical antipsychotic but stopped taking it after 2 days.     ADHD: Diagnosed as child, fidgety, interrupts, inattentive, no follow-through, easily distracted   Depressive Disorder: depressed mood, sleep change, worthlessness, passive suicidal ideation, tearfulness/crying   Anxiety Disorder: anxiety/nervousness, hyperarousal symptoms, re-experiencing symptoms, fatigue, irritability, sleep problems, avoidance symptoms   Panic Disorder: " "nervous   Manic Disorder: expansive mood, increased distractability, decreased need for sleep, hyperverbal, racing thoughts, reckless/risk-taking behavior   Psychotic Disorder: denied   Substance Use:  Alcohol: half to whole bottle of wine nightly for past 3 months; prior to that 1 glass of wine nightly     Review of Systems   Constitutional:  Positive for activity change, appetite change and fatigue. Negative for unexpected weight change.   Respiratory:  Negative for shortness of breath.    Psychiatric/Behavioral:  Positive for agitation, decreased concentration, dysphoric mood and sleep disturbance. Negative for behavioral problems, confusion, hallucinations, self-injury and suicidal ideas. The patient is nervous/anxious and is hyperactive.       Nutritional Screening: Considering the patient's height and weight, medications, medical history and preferences, should a referral be made to the dietitian? no    Constitutional:  Vitals:  Most recent vital signs were reviewed.   Last 3 sets of Vitals    Vitals - 1 value per visit 2/24/2022 2/24/2022 9/1/2022   SYSTOLIC - 118 106   DIASTOLIC - 76 69   Pulse - 100 69   Temp - 97.7 -   Resp - 18 -   SPO2 - 97 -   Weight (lb) - 204.59 -   Weight (kg) - 92.8 -   Height - 67 -   BMI (Calculated) - 32 -   VISIT REPORT - - -   Pain Score  4 - -   Some recent data might be hidden            Psychiatric:  Oriented: x 3 / including: Date: 9/1/22; and aware meeting with [facility] [place]  Attitude: cooperative   Eye Contact: good   Behavior: wnl   Mood: "down"  Affect: tearful, distressed consistent with conversation  Attention: intact   Concentration: grossly intact   Thought Process: goal directed   Speech: intelligible  Volume: WNL   Quantity: WNL   Rhythm: WNL  Insight: fair to good   Threats: no SI / HI   Memory: Grossly intact  Psychosis: denies all   Estimate of Intellectual Function: average   Judgment (to simple situation): fair   Relevant Elements of Neurological " Exam: normal gait     Medical history:   Past Medical History:   Diagnosis Date    Abnormal Pap smear     HPV/ biopsy-- never had a normal pap    Allergy     year around; tried OTC Zyrtec, Allegra, which makes her sleepy; increased water has helped partially    Closed displaced fracture of proximal phalanx of lesser toe of left foot 8/7/2017    COVID-19 virus infection 7/21/2020    Diabetes mellitus 11/2020    BS doesn't check 04/25/2022    Family history of breast cancer in female     Early breast cancer on mother's side of the family    History of HPV infection 01/01/2002    Initially had abnormal PAPs, except for 2015.    Left ovarian cyst     2 cm per CT abn & Pelvis w/o at [facility] on 12/7/19        Family History:  Family History   Problem Relation Age of Onset    Hypertension Mother     Alcohol abuse Mother         Recovered    Depression Mother     Drug abuse Mother         Recovered    Alcohol abuse Father     Drug abuse Father         Active    Breast cancer Maternal Grandmother     Hypertension Maternal Grandmother     Glaucoma Maternal Grandmother     Cataracts Maternal Grandmother     Vision loss Maternal Grandmother         Went blind at 25    Breast cancer Maternal Aunt     Breast cancer Maternal Aunt     Breast cancer Maternal Aunt     Breast cancer Maternal Aunt     Alcohol abuse Sister         Recovered    Drug abuse Sister         Recovered    Ovarian cancer Neg Hx     Stroke Neg Hx         Family history of psychiatric illness: Mother: Bipolar Disorder    PSYCHO-SOCIAL DEVELOPMENT HISTORY:   Social History     Socioeconomic History    Marital status: Other   Occupational History    Occupation: [employer]      Comment: [employer]   Tobacco Use    Smoking status: Never    Smokeless tobacco: Never   Substance and Sexual Activity    Alcohol use: Yes     Alcohol/week: 0.0 standard drinks     Comment: 2 drinks every 2 weeks    Drug use: Never    Sexual activity: Yes     Partners: Male     Birth  "control/protection: None     Comment: My  is fixed   Social History Narrative    Breakfast: Protein shake (grass fed & powder CLA), water, +5 hour energy drink throughout the day    Lunch: Meat: shrimp & 1/2 sweet potato; water     Dinner: Salad with black olives, olive oil, little salt; water    Snacks: None (no sodas or diet sodas)    Eats out: 1x/mo: [place]: grilled shrimp, sweet potato, part of a salad; water    PA: 45 min resistance 3x/wk, runs 5 mi 3-4 x/wk, yoga in the sauna "[employer]"    Water: 2 - 24 oz/d + 8 = 56 oz/d        Allergy Review:   Review of patient's allergies indicates:   Allergen Reactions    House dust mite Tinitus        Medical Problem List:   Patient Active Problem List   Diagnosis    Chronic neck pain    Family history of breast cancer in female    Seasonal allergies    History of 2019 novel coronavirus disease (COVID-19)    Sebaceous cyst    Vision impairment    Contraceptive management    Dyspnea    Chest pain at rest    Irregular bleeding    Stress    EKG abnormalities    PAC (premature atrial contraction)    Pain of left heel    Acute right ankle pain    Chronic left-sided thoracic back pain        Encounter Diagnoses   Name Primary?    Bipolar affective disorder, current episode mixed, without psychotic features Yes    Attention deficit hyperactivity disorder (ADHD), combined type     Insomnia due to mental disorder         IMPRESSIONS/PLAN  Pt meets diagnostic criteria for Bipolar Disorder, current episode mixed; ADHD (by history); Insomnia, and symptoms of a trauma-related disorder.   Medication Management: Discussed risks, benefits, and alternatives to treatment plan documented above with patient. I answered all patient questions related to this plan, and patient expressed understanding and agreement.      Follow up in about 4 weeks (around 9/29/2022) for Medication follow up.     Medication List with Changes/Refills   New Medications    LAMOTRIGINE (LAMICTAL) 25 MG " TABLET    Take 1 tablet (25 mg total) by mouth once daily for 14 days, THEN 2 tablets (50 mg total) once daily for 14 days, THEN 3 tablets (75 mg total) once daily for 14 days, THEN 4 tablets (100 mg total) once daily.    TRAZODONE (DESYREL) 50 MG TABLET    Take 0.5-1 tablets (25-50 mg total) by mouth nightly as needed for Insomnia.   Current Medications    ETONOGESTREL-ETHINYL ESTRADIOL (NUVARING) 0.12-0.015 MG/24 HR VAGINAL RING    Insert one ring vaginally and remove and insert new ring once a month    PROAIR HFA 90 MCG/ACTUATION INHALER    INHALE 2 PUFFS INTO THE LUNGS Q 4 HOURS PRN FOR SOB OR WHEEZING   Discontinued Medications    AZELASTINE (ASTELIN) 137 MCG (0.1 %) NASAL SPRAY    1 spray (137 mcg total) by Nasal route 2 (two) times daily.    FLUTICASONE PROPIONATE (FLONASE) 50 MCG/ACTUATION NASAL SPRAY    1 spray (50 mcg total) by Each Nostril route once daily.    ONDANSETRON (ZOFRAN) 4 MG TABLET    Take 1 tablet (4 mg total) by mouth every 6 (six) hours.        Time spent with pt including note preparation: 90 minutes     Freda Tripathi, PhD, MP  Medical Psychologist

## 2022-09-06 PROBLEM — R07.9 CHEST PAIN AT REST: Status: RESOLVED | Noted: 2021-11-10 | Resolved: 2022-09-06

## 2022-09-06 PROBLEM — L72.3 SEBACEOUS CYST: Status: RESOLVED | Noted: 2020-09-22 | Resolved: 2022-09-06

## 2022-09-06 PROBLEM — N92.6 IRREGULAR BLEEDING: Status: RESOLVED | Noted: 2021-11-10 | Resolved: 2022-09-06

## 2022-09-06 PROBLEM — R94.31 EKG ABNORMALITIES: Status: RESOLVED | Noted: 2021-11-19 | Resolved: 2022-09-06

## 2022-09-06 PROBLEM — M79.672 PAIN OF LEFT HEEL: Status: RESOLVED | Noted: 2022-02-24 | Resolved: 2022-09-06

## 2022-09-06 PROBLEM — Z86.16 HISTORY OF 2019 NOVEL CORONAVIRUS DISEASE (COVID-19): Status: RESOLVED | Noted: 2020-09-22 | Resolved: 2022-09-06

## 2022-09-06 NOTE — PROGRESS NOTES
Subjective:      Brionna Carrion is a 41 y.o. female here to the office today as to:  ESTABLISH CARE      HPI:    Brionna is here to establish care.  I have actually seen her previously on 2/15/2022 for virtual visit.  She is here alone today, no family members or companions are here with her.  The patient's medical history will be reviewed and updated the computerized patient record.    Previous Provider:  Dr. Radha Griffiths  Date of last annual wellness exam:  None but did have est care appt with Dr. Griffiths 11/2021 with lab done    Current specialty providers:  Ob/Gyn:  Joy Grullon, TI  Opth:  Dr. Anuel Shankar  Psychology:  Dr. Freda Tripathi  LCSW:  Anupam Paul  Sleep:  Dr. Pramod Riggins  Pain:  Dr. Kareem Nelson  Cardiology:  Dr. Félix Draper      Review of Systems   Constitutional:  Negative for activity change, appetite change, chills, diaphoresis, fatigue and fever.   HENT: Negative.     Eyes:  Negative for photophobia, pain and visual disturbance.   Respiratory:  Positive for cough (prod --- clear to yellow), shortness of breath (mainly w/ overexertion --- causes issues as she is in ) and wheezing. Negative for chest tightness.         History of recurrent COVID infections.  Having some residual cough, wheezing and sob at times.  She was supposed to have a PFT previously but ended up not being done due to insurance and/or scheduling issues.   Cardiovascular:  Negative for chest pain, palpitations and leg swelling.   Gastrointestinal:  Negative for abdominal distention, abdominal pain, anal bleeding, blood in stool, constipation, diarrhea, nausea, rectal pain and vomiting.   Endocrine: Negative for polydipsia, polyphagia and polyuria.   Genitourinary: Negative.    Musculoskeletal: Negative.    Skin:  Negative for rash and wound.   Neurological: Negative.  Negative for dizziness, tremors, seizures, syncope, facial asymmetry, speech difficulty, weakness, light-headedness, numbness and headaches.    Hematological:  Negative for adenopathy. Does not bruise/bleed easily.   Psychiatric/Behavioral:  Positive for agitation and dysphoric mood. Negative for confusion, decreased concentration, hallucinations, self-injury, sleep disturbance and suicidal ideas. The patient is nervous/anxious. The patient is not hyperactive.         Followed currently by mental health provider.  She has been doing better; reports rough time, has been very emotional going through divorce.  On rx as ordered.  Has been dx with bipolar, does report mood swings but overall improving.       Review of patient's allergies indicates:   Allergen Reactions    House dust mite Tinitus       Patient Active Problem List   Diagnosis    Chronic neck pain    Family history of breast cancer in female    Seasonal allergies    Vision impairment    Contraceptive management    Stress    PAC (premature atrial contraction)    Chronic left-sided thoracic back pain         Current Outpatient Medications:     etonogestreL-ethinyl estradioL (NUVARING) 0.12-0.015 mg/24 hr vaginal ring, Insert one ring vaginally and remove and insert new ring once a month, Disp: 1 each, Rfl: 11    lamoTRIgine (LAMICTAL) 25 MG tablet, Take 1 tablet (25 mg total) by mouth once daily for 14 days, THEN 2 tablets (50 mg total) once daily for 14 days, THEN 3 tablets (75 mg total) once daily for 14 days, THEN 4 tablets (100 mg total) once daily., Disp: 204 tablet, Rfl: 0    PROAIR HFA 90 mcg/actuation inhaler, INHALE 2 PUFFS INTO THE LUNGS Q 4 HOURS PRN FOR SOB OR WHEEZING, Disp: , Rfl:     traZODone (DESYREL) 50 MG tablet, Take 0.5-1 tablets (25-50 mg total) by mouth nightly as needed for Insomnia., Disp: 30 tablet, Rfl: 2      Past Medical History:   Diagnosis Date    Abnormal Pap smear     HPV/ biopsy-- never had a normal pap    Allergy     year around; tried OTC Zyrtec, Allegra, which makes her sleepy; increased water has helped partially    Closed displaced fracture of proximal phalanx  "of lesser toe of left foot 8/7/2017    COVID-19 virus infection 7/21/2020    Diabetes mellitus 11/2020    BS doesn't check 04/25/2022    Family history of breast cancer in female     Early breast cancer on mother's side of the family    History of 2019 novel coronavirus disease (COVID-19) 9/22/2020 July 2020 December 2020    History of HPV infection 01/01/2002    Initially had abnormal PAPs, except for 2015.    Left ovarian cyst     2 cm per CT abn & Pelvis w/o at [facility] on 12/7/19       Past Surgical History:   Procedure Laterality Date    APPENDECTOMY      BREAST RECONSTRUCTION      augmentation    TONSILLECTOMY         Family History   Problem Relation Age of Onset    Hypertension Mother     Alcohol abuse Mother         Recovered    Depression Mother     Drug abuse Mother         Recovered    Alcohol abuse Father     Drug abuse Father         Active    Breast cancer Maternal Grandmother     Hypertension Maternal Grandmother     Glaucoma Maternal Grandmother     Cataracts Maternal Grandmother     Vision loss Maternal Grandmother         Went blind at 25    Breast cancer Maternal Aunt     Breast cancer Maternal Aunt     Breast cancer Maternal Aunt     Breast cancer Maternal Aunt     Alcohol abuse Sister         Recovered    Drug abuse Sister         Recovered    Ovarian cancer Neg Hx     Stroke Neg Hx        Social History     Socioeconomic History    Marital status:    Occupational History    Occupation: [employer]      Comment:  [id number]   Tobacco Use    Smoking status: Never    Smokeless tobacco: Never   Substance and Sexual Activity    Alcohol use: Yes     Alcohol/week: 0.0 standard drinks     Comment: 2 drinks every 2 weeks    Drug use: Never    Sexual activity: Yes     Partners: Male         Objective:     Vitals:    09/07/22 1623   BP: 110/68   Pulse: 94   Temp: 98.4 °F (36.9 °C)   SpO2: 98%   Weight: 90.9 kg (200 lb 8.1 oz)   Height: 5' 7" (1.702 m)       Physical Exam  Vitals reviewed. "   Constitutional:       General: She is not in acute distress.  HENT:      Head: Normocephalic and atraumatic.   Eyes:      Extraocular Movements: Extraocular movements intact.      Pupils: Pupils are equal, round, and reactive to light.   Neck:      Vascular: No carotid bruit.   Cardiovascular:      Rate and Rhythm: Normal rate and regular rhythm.      Pulses: Normal pulses.      Heart sounds: Normal heart sounds. No murmur heard.    No gallop.   Pulmonary:      Effort: Pulmonary effort is normal. No respiratory distress.      Breath sounds: No stridor. Wheezing and rhonchi (clear w/ cough) present. No rales.   Chest:      Chest wall: No tenderness.   Musculoskeletal:         General: No swelling, tenderness or deformity. Normal range of motion.      Cervical back: Normal range of motion and neck supple. No rigidity. No muscular tenderness.   Skin:     General: Skin is warm and dry.      Capillary Refill: Capillary refill takes less than 2 seconds.      Findings: No rash.   Neurological:      General: No focal deficit present.      Mental Status: She is alert and oriented to person, place, and time. Mental status is at baseline.      Cranial Nerves: No cranial nerve deficit.      Sensory: No sensory deficit.      Motor: No weakness.      Coordination: Coordination normal.      Gait: Gait normal.      Deep Tendon Reflexes: Reflexes normal.   Psychiatric:         Mood and Affect: Mood normal.         Behavior: Behavior normal.         Thought Content: Thought content normal.         Judgment: Judgment normal.       Diagnosis:     1. Encounter to establish care    2. Bronchitis  - Complete PFT w/ bronchodilator; Future  - X-Ray Chest PA And Lateral; Future  - predniSONE (DELTASONE) 20 MG tablet; Take 2 tab daily x 3 days, 1 tab daily x 3 days, then 1/2 tab daily x 2 days.  Dispense: 10 tablet; Refill: 0  - azithromycin (ZITHROMAX Z-GLORY) 250 MG tablet; Take 2 tab today, then 1 tab daily x 4 days.  Dispense: 6 tablet;  Refill: 0  - benzonatate (TESSALON) 100 MG capsule; Take 1-2 capsules (100-200 mg total) by mouth 3 (three) times daily as needed for Cough.  Dispense: 30 capsule; Refill: 0  - albuterol (PROVENTIL/VENTOLIN HFA) 90 mcg/actuation inhaler; Inhale 2 puffs into the lungs every 4 to 6 hours as needed for Wheezing.  Dispense: 18 g; Refill: 2    3. Post-COVID syndrome  - Complete PFT w/ bronchodilator; Future  - X-Ray Chest PA And Lateral; Future  - predniSONE (DELTASONE) 20 MG tablet; Take 2 tab daily x 3 days, 1 tab daily x 3 days, then 1/2 tab daily x 2 days.  Dispense: 10 tablet; Refill: 0  - azithromycin (ZITHROMAX Z-GLORY) 250 MG tablet; Take 2 tab today, then 1 tab daily x 4 days.  Dispense: 6 tablet; Refill: 0  - benzonatate (TESSALON) 100 MG capsule; Take 1-2 capsules (100-200 mg total) by mouth 3 (three) times daily as needed for Cough.  Dispense: 30 capsule; Refill: 0  - albuterol (PROVENTIL/VENTOLIN HFA) 90 mcg/actuation inhaler; Inhale 2 puffs into the lungs every 4 to 6 hours as needed for Wheezing.  Dispense: 18 g; Refill: 2      Plan:     Medications ordered, as above, discussed.  Supportive care, rest, hydration.    Follow-up:     Pending results of CXR and PFT.  Contact office back in 3 to 4 days if no improvement noted.  RTC as directed and/or prn.        LIANG Nassar  [facility]       30 minutes of total time spent on the encounter, which includes face to face time and non-face to face time preparing to see the patient (eg, review of tests), obtaining and/or reviewing separately obtained history, and documenting clinical information in the electronic or other health record.  Also includes independent interpretation of results (not separately reported) and communicating results to the patient/family/caregiver, with care coordination (not separately reported).

## 2022-09-07 ENCOUNTER — HOSPITAL ENCOUNTER (OUTPATIENT)
Dept: RADIOLOGY | Facility: HOSPITAL | Age: 42
Discharge: HOME | End: 2022-09-07
Attending: REGISTERED NURSE
Payer: OTHER GOVERNMENT

## 2022-09-07 ENCOUNTER — OFFICE VISIT (OUTPATIENT)
Dept: FAMILY MEDICINE | Facility: CLINIC | Age: 42
End: 2022-09-07
Payer: OTHER GOVERNMENT

## 2022-09-07 ENCOUNTER — HOSPITAL ENCOUNTER (OUTPATIENT)
Dept: RADIOLOGY | Facility: HOSPITAL | Age: 42
Discharge: HOME | End: 2022-09-07
Attending: ORTHOPAEDIC SURGERY
Payer: OTHER GOVERNMENT

## 2022-09-07 VITALS
HEART RATE: 94 BPM | OXYGEN SATURATION: 98 % | TEMPERATURE: 98 F | DIASTOLIC BLOOD PRESSURE: 68 MMHG | BODY MASS INDEX: 31.47 KG/M2 | HEIGHT: 67 IN | WEIGHT: 200.5 LBS | SYSTOLIC BLOOD PRESSURE: 110 MMHG

## 2022-09-07 DIAGNOSIS — U09.9 POST-COVID SYNDROME: ICD-10-CM

## 2022-09-07 DIAGNOSIS — J40 BRONCHITIS: ICD-10-CM

## 2022-09-07 DIAGNOSIS — F31.9 BIPOLAR AFFECTIVE DISORDER, REMISSION STATUS UNSPECIFIED: ICD-10-CM

## 2022-09-07 DIAGNOSIS — M50.30 DDD (DEGENERATIVE DISC DISEASE), CERVICAL: ICD-10-CM

## 2022-09-07 DIAGNOSIS — Z76.89 ENCOUNTER TO ESTABLISH CARE: Primary | ICD-10-CM

## 2022-09-07 PROBLEM — H54.7 VISION IMPAIRMENT: Status: RESOLVED | Noted: 2020-09-22 | Resolved: 2022-09-07

## 2022-09-07 PROCEDURE — 99214 PR OFFICE/OUTPT VISIT, EST, LEVL IV, 30-39 MIN: ICD-10-PCS | Mod: S$PBB,,, | Performed by: REGISTERED NURSE

## 2022-09-07 PROCEDURE — 71046 X-RAY EXAM CHEST 2 VIEWS: CPT | Mod: 26,,, | Performed by: RADIOLOGY

## 2022-09-07 PROCEDURE — 99999 PR PBB SHADOW E&M-EST. PATIENT-LVL III: ICD-10-PCS | Mod: PBBFAC,,, | Performed by: REGISTERED NURSE

## 2022-09-07 PROCEDURE — 71046 XR CHEST PA AND LATERAL: ICD-10-PCS | Mod: 26,,, | Performed by: RADIOLOGY

## 2022-09-07 PROCEDURE — 99214 OFFICE O/P EST MOD 30 MIN: CPT | Mod: S$PBB,,, | Performed by: REGISTERED NURSE

## 2022-09-07 PROCEDURE — 99213 OFFICE O/P EST LOW 20 MIN: CPT | Mod: PBBFAC,25,PO | Performed by: REGISTERED NURSE

## 2022-09-07 PROCEDURE — 99999 PR PBB SHADOW E&M-EST. PATIENT-LVL III: CPT | Mod: PBBFAC,,, | Performed by: REGISTERED NURSE

## 2022-09-07 PROCEDURE — 71046 X-RAY EXAM CHEST 2 VIEWS: CPT | Mod: TC,FY,PO

## 2022-09-07 RX ORDER — ALBUTEROL SULFATE 90 UG/1
2 AEROSOL, METERED RESPIRATORY (INHALATION)
Qty: 18 G | Refills: 2 | Status: SHIPPED | OUTPATIENT
Start: 2022-09-07 | End: 2023-09-07

## 2022-09-07 RX ORDER — PREDNISONE 20 MG/1
TABLET ORAL
Qty: 10 TABLET | Refills: 0 | Status: SHIPPED | OUTPATIENT
Start: 2022-09-07 | End: 2023-06-02

## 2022-09-07 RX ORDER — AZITHROMYCIN 250 MG/1
TABLET, FILM COATED ORAL
Qty: 6 TABLET | Refills: 0 | Status: SHIPPED | OUTPATIENT
Start: 2022-09-07 | End: 2022-09-26

## 2022-09-07 RX ORDER — BENZONATATE 100 MG/1
100-200 CAPSULE ORAL 3 TIMES DAILY PRN
Qty: 30 CAPSULE | Refills: 0 | Status: SHIPPED | OUTPATIENT
Start: 2022-09-07 | End: 2023-11-30

## 2022-09-08 ENCOUNTER — TELEPHONE (OUTPATIENT)
Dept: ADMINISTRATIVE | Facility: HOSPITAL | Age: 42
End: 2022-09-08
Payer: OTHER GOVERNMENT

## 2022-09-15 ENCOUNTER — PATIENT MESSAGE (OUTPATIENT)
Dept: FAMILY MEDICINE | Facility: CLINIC | Age: 42
End: 2022-09-15
Payer: OTHER GOVERNMENT

## 2022-09-26 ENCOUNTER — OFFICE VISIT (OUTPATIENT)
Dept: INTERNAL MEDICINE | Facility: CLINIC | Age: 42
End: 2022-09-26
Payer: OTHER GOVERNMENT

## 2022-09-26 ENCOUNTER — PATIENT MESSAGE (OUTPATIENT)
Dept: INTERNAL MEDICINE | Facility: CLINIC | Age: 42
End: 2022-09-26

## 2022-09-26 DIAGNOSIS — K52.9 GASTROENTERITIS: Primary | ICD-10-CM

## 2022-09-26 PROCEDURE — 99212 OFFICE O/P EST SF 10 MIN: CPT | Mod: 95,,, | Performed by: NURSE PRACTITIONER

## 2022-09-26 PROCEDURE — 99212 PR OFFICE/OUTPT VISIT, EST, LEVL II, 10-19 MIN: ICD-10-PCS | Mod: 95,,, | Performed by: NURSE PRACTITIONER

## 2022-09-26 NOTE — PROGRESS NOTES
The patient location is: [place] at home  The chief complaint leading to consultation is: diarrhea    Visit type: audiovisual    Face to Face time with patient: 10 minutes  18 minutes of total time spent on the encounter, which includes face to face time and non-face to face time preparing to see the patient (eg, review of tests), Obtaining and/or reviewing separately obtained history, Documenting clinical information in the electronic or other health record, Independently interpreting results (not separately reported) and communicating results to the patient/family/caregiver, or Care coordination (not separately reported).     Each patient to whom he or she provides medical services by telemedicine is:  (1) informed of the relationship between the physician and patient and the respective role of any other health care provider with respect to management of the patient; and (2) notified that he or she may decline to receive medical services by telemedicine and may withdraw from such care at any time.      Subjective:       Patient ID: Brionna Carrion is a 41 y.o. female.    Chief Complaint: Diarrhea    Ms. Carrion is seen via telemedicine for c/o diarrhea, body aches, chills, and headache x 1 day. Reports developed a mild headache yesterday. At 2:30 AM this morning developed diarrhea, body aches, chills, and worsening headache. Taking Dayquil and BC powder without relief. Denies fever, abd pain, N/V, or bloody stool.    Diarrhea   This is a new problem. The current episode started today. The problem occurs 5 to 10 times per day. The problem has been unchanged. The stool consistency is described as Watery. Associated symptoms include bloating, chills, coughing (chronic cough), headaches, increased flatus and myalgias. Pertinent negatives include no abdominal pain, arthralgias, fever, sweats, URI, vomiting or weight loss. Risk factors include ill contacts (sick contacts at work). She has tried nothing for the symptoms.  The treatment provided no relief.     Patient Active Problem List   Diagnosis    Chronic neck pain    Family history of breast cancer in female    Seasonal allergies    Contraceptive management    Stress    PAC (premature atrial contraction)    Chronic left-sided thoracic back pain       Family History   Problem Relation Age of Onset    Hypertension Mother     Alcohol abuse Mother         Recovered    Depression Mother     Drug abuse Mother         Recovered    Alcohol abuse Father     Drug abuse Father         Active    Breast cancer Maternal Grandmother     Hypertension Maternal Grandmother     Glaucoma Maternal Grandmother     Cataracts Maternal Grandmother     Vision loss Maternal Grandmother         Went blind at 25    Breast cancer Maternal Aunt     Breast cancer Maternal Aunt     Breast cancer Maternal Aunt     Breast cancer Maternal Aunt     Alcohol abuse Sister         Recovered    Drug abuse Sister         Recovered    Ovarian cancer Neg Hx     Stroke Neg Hx      Past Surgical History:   Procedure Laterality Date    APPENDECTOMY      BREAST RECONSTRUCTION      augmentation    TONSILLECTOMY           Current Outpatient Medications:     albuterol (PROVENTIL/VENTOLIN HFA) 90 mcg/actuation inhaler, Inhale 2 puffs into the lungs every 4 to 6 hours as needed for Wheezing., Disp: 18 g, Rfl: 2    benzonatate (TESSALON) 100 MG capsule, Take 1-2 capsules (100-200 mg total) by mouth 3 (three) times daily as needed for Cough., Disp: 30 capsule, Rfl: 0    etonogestreL-ethinyl estradioL (NUVARING) 0.12-0.015 mg/24 hr vaginal ring, Insert one ring vaginally and remove and insert new ring once a month, Disp: 1 each, Rfl: 11    lamoTRIgine (LAMICTAL) 25 MG tablet, Take 1 tablet (25 mg total) by mouth once daily for 14 days, THEN 2 tablets (50 mg total) once daily for 14 days, THEN 3 tablets (75 mg total) once daily for 14 days, THEN 4 tablets (100 mg total) once daily., Disp: 204 tablet, Rfl: 0    predniSONE (DELTASONE) 20  MG tablet, Take 2 tab daily x 3 days, 1 tab daily x 3 days, then 1/2 tab daily x 2 days., Disp: 10 tablet, Rfl: 0    PROAIR HFA 90 mcg/actuation inhaler, INHALE 2 PUFFS INTO THE LUNGS Q 4 HOURS PRN FOR SOB OR WHEEZING, Disp: , Rfl:     traZODone (DESYREL) 50 MG tablet, Take 0.5-1 tablets (25-50 mg total) by mouth nightly as needed for Insomnia., Disp: 30 tablet, Rfl: 2    Review of Systems   Constitutional:  Positive for chills and unexpected weight change. Negative for activity change, fever and weight loss.   HENT:  Negative for rhinorrhea.    Eyes:  Negative for discharge.   Respiratory:  Positive for cough (chronic cough).    Cardiovascular:  Negative for chest pain and palpitations.   Gastrointestinal:  Positive for bloating, diarrhea and flatus. Negative for abdominal pain, blood in stool, constipation, nausea and vomiting.   Endocrine: Negative for polyuria.   Genitourinary:  Negative for difficulty urinating, dysuria, hematuria and menstrual problem.   Musculoskeletal:  Positive for myalgias. Negative for arthralgias, joint swelling and neck pain.   Neurological:  Positive for weakness and headaches.   Psychiatric/Behavioral:  Positive for dysphoric mood. Negative for confusion.      Objective:   There were no vitals taken for this visit.     Physical Exam  Constitutional:       General: She is not in acute distress.     Appearance: She is ill-appearing (mild).   HENT:      Head: Normocephalic.   Eyes:      Conjunctiva/sclera: Conjunctivae normal.   Pulmonary:      Effort: Pulmonary effort is normal. No respiratory distress.   Abdominal:      Comments: Reports generalized abdominal tenderness to self palpation.   Neurological:      Mental Status: She is alert and oriented to person, place, and time.   Psychiatric:         Mood and Affect: Mood normal.         Behavior: Behavior normal.         Thought Content: Thought content normal.       Assessment & Plan     Problem List Items Addressed This Visit   "  None  Visit Diagnoses       Gastroenteritis    -  Primary    [place] diet. Electrolyte rich fluids. Rest and stay well hydrated. OTC analgesics PRN. Okay for OTC anti-diarrheal medications. ER precautions discussed.          Return to clinic for in person visit if symptoms do not improve or worsen.    ISABELLA Miller    Portions of this note may have been created with voice recognition software. Occasional "wrong-word" or "sound-a-like" substitutions may have occurred due to the inherent limitations of voice recognition software. Please, read the note carefully and recognize, using context, where substitutions have occurred.       "

## 2022-09-26 NOTE — PATIENT INSTRUCTIONS
Stay well hydrated. Electrolyte rich fluids.  [place] diet as tolerated  Okay to take antidiarrheal medications if no fever.  Tylenol/ibuprofen as needed for headaches/body aches.  Rest  Monitor for signs/symptoms of dehydration.   Return to clinic if symptoms do not improve or worsen.

## 2022-09-26 NOTE — LETTER
09/26/2022                 [facility]  [address]  [place] [address]  Phone: [phone]  Fax: [phone]   09/26/2022    Patient: Brionna Carrion   YOB: 1980   Date of Visit: 9/26/2022       To Whom it May Concern:    Brionna Carrion was seen in my clinic on 9/26/2022. She may return to work on 9/28/22 .    If you have any questions or concerns, please don't hesitate to call.    Sincerely,         ISABELLA Davis

## 2022-09-28 ENCOUNTER — TELEPHONE (OUTPATIENT)
Dept: PSYCHIATRY | Facility: CLINIC | Age: 42
End: 2022-09-28
Payer: OTHER GOVERNMENT

## 2022-10-04 ENCOUNTER — TELEPHONE (OUTPATIENT)
Dept: FAMILY MEDICINE | Facility: CLINIC | Age: 42
End: 2022-10-04
Payer: OTHER GOVERNMENT

## 2022-10-04 ENCOUNTER — OFFICE VISIT (OUTPATIENT)
Dept: PODIATRY | Facility: CLINIC | Age: 42
End: 2022-10-04
Payer: OTHER GOVERNMENT

## 2022-10-04 VITALS — WEIGHT: 200.38 LBS | HEIGHT: 67 IN | BODY MASS INDEX: 31.45 KG/M2

## 2022-10-04 DIAGNOSIS — M79.672 PAIN IN LEFT FOOT: ICD-10-CM

## 2022-10-04 DIAGNOSIS — M24.572 CONTRACTURE, LEFT ANKLE: ICD-10-CM

## 2022-10-04 DIAGNOSIS — M72.2 PLANTAR FASCIITIS: Primary | ICD-10-CM

## 2022-10-04 PROCEDURE — 99999 PR PBB SHADOW E&M-EST. PATIENT-LVL III: ICD-10-PCS | Mod: PBBFAC,,, | Performed by: PODIATRIST

## 2022-10-04 PROCEDURE — 99203 OFFICE O/P NEW LOW 30 MIN: CPT | Mod: 25,S$PBB,, | Performed by: PODIATRIST

## 2022-10-04 PROCEDURE — 20550 NJX 1 TENDON SHEATH/LIGAMENT: CPT | Mod: PBBFAC,LT | Performed by: PODIATRIST

## 2022-10-04 PROCEDURE — 99203 PR OFFICE/OUTPT VISIT, NEW, LEVL III, 30-44 MIN: ICD-10-PCS | Mod: 25,S$PBB,, | Performed by: PODIATRIST

## 2022-10-04 PROCEDURE — 20550 NJX 1 TENDON SHEATH/LIGAMENT: CPT | Mod: S$PBB,LT,, | Performed by: PODIATRIST

## 2022-10-04 PROCEDURE — 99999 PR PBB SHADOW E&M-EST. PATIENT-LVL III: CPT | Mod: PBBFAC,,, | Performed by: PODIATRIST

## 2022-10-04 PROCEDURE — 20550 PR INJECT TENDON SHEATH/LIGAMENT: ICD-10-PCS | Mod: S$PBB,LT,, | Performed by: PODIATRIST

## 2022-10-04 PROCEDURE — 99213 OFFICE O/P EST LOW 20 MIN: CPT | Mod: PBBFAC,25 | Performed by: PODIATRIST

## 2022-10-04 RX ORDER — ARIPIPRAZOLE 10 MG/1
TABLET ORAL
COMMUNITY
Start: 2022-09-06 | End: 2023-11-30

## 2022-10-04 RX ORDER — DEXAMETHASONE SODIUM PHOSPHATE 4 MG/ML
4 INJECTION, SOLUTION INTRA-ARTICULAR; INTRALESIONAL; INTRAMUSCULAR; INTRAVENOUS; SOFT TISSUE ONCE
Status: COMPLETED | OUTPATIENT
Start: 2022-10-04 | End: 2022-10-04

## 2022-10-04 RX ORDER — METHYLPREDNISOLONE ACETATE 40 MG/ML
40 INJECTION, SUSPENSION INTRA-ARTICULAR; INTRALESIONAL; INTRAMUSCULAR; SOFT TISSUE
Status: COMPLETED | OUTPATIENT
Start: 2022-10-04 | End: 2022-10-04

## 2022-10-04 RX ADMIN — METHYLPREDNISOLONE ACETATE 40 MG: 40 INJECTION, SUSPENSION INTRALESIONAL; INTRAMUSCULAR; INTRASYNOVIAL; SOFT TISSUE at 09:10

## 2022-10-04 RX ADMIN — DEXAMETHASONE SODIUM PHOSPHATE 4 MG: 4 INJECTION, SOLUTION INTRA-ARTICULAR; INTRALESIONAL; INTRAMUSCULAR; INTRAVENOUS; SOFT TISSUE at 09:10

## 2022-10-04 NOTE — PROGRESS NOTES
Subjective:       Patient ID: Brionna Carrion is a 41 y.o. female.    Chief Complaint: Plantar Fasciitis (4/10 pain to left heel, left is worse than right. Requests MRI. Non diabetic PCP: Dr. Flores NP last seen 09/07/22)      HPI: Brionna Carrion complains of moderate to severe pains to the left foot. States pains are sharp and stabbing-like in nature. Pains are to the plantar foot, mostly with walking and standing. Rates the pains at approx. 5/10. States post-static dyskinesia. Denies any recent identifiable trauma. Does limp with gait. States seldom NSAID medications thus far. Pains have been present for the past several weeks to months and the pains have worsened over the past couple of weeks. She does not have a history of plantar fasciitis. States walking and standing causes and/or exacerbates the symptoms. Patient has had no corticosteroid injection(s) to the left foot, prior. Patient's Primary Care Provider is José Manuel Flores NP.     Review of patient's allergies indicates:   Allergen Reactions    House dust mite Tinitus       Past Medical History:   Diagnosis Date    Abnormal Pap smear     HPV/ biopsy-- never had a normal pap    Allergy     year around; tried OTC Zyrtec, Allegra, which makes her sleepy; increased water has helped partially    Closed displaced fracture of proximal phalanx of lesser toe of left foot 8/7/2017    COVID-19 virus infection 7/21/2020    Diabetes mellitus 11/2020    BS doesn't check 04/25/2022    Family history of breast cancer in female     Early breast cancer on mother's side of the family    History of 2019 novel coronavirus disease (COVID-19) 9/22/2020 July 2020 December 2020    History of HPV infection 01/01/2002    Initially had abnormal PAPs, except for 2015.    Left ovarian cyst     2 cm per CT abn & Pelvis w/o at [facility] on 12/7/19       Family History   Problem Relation Age of Onset    Hypertension Mother     Alcohol abuse Mother         Recovered    Depression Mother      "Drug abuse Mother         Recovered    Alcohol abuse Father     Drug abuse Father         Active    Breast cancer Maternal Grandmother     Hypertension Maternal Grandmother     Glaucoma Maternal Grandmother     Cataracts Maternal Grandmother     Vision loss Maternal Grandmother         Went blind at 25    Breast cancer Maternal Aunt     Breast cancer Maternal Aunt     Breast cancer Maternal Aunt     Breast cancer Maternal Aunt     Alcohol abuse Sister         Recovered    Drug abuse Sister         Recovered    Ovarian cancer Neg Hx     Stroke Neg Hx        Social History     Socioeconomic History    Marital status:    Occupational History    Occupation: [employer]      Comment:  [employer]   Tobacco Use    Smoking status: Never    Smokeless tobacco: Never   Substance and Sexual Activity    Alcohol use: Yes     Alcohol/week: 0.0 standard drinks     Comment: 2 drinks every 2 weeks    Drug use: Never    Sexual activity: Yes     Partners: Male   Social History Narrative    Breakfast: Protein shake (grass fed & powder CLA), water, +5 hour energy drink throughout the day    Lunch: Meat: shrimp & 1/2 sweet potato; water     Dinner: Salad with black olives, olive oil, little salt; water    Snacks: None (no sodas or diet sodas)    Eats out: 1x/mo: [place]: grilled shrimp, sweet potato, part of a salad; water    PA: 45 min resistance 3x/wk, runs 5 mi 3-4 x/wk, yoga in the sauna "[employer]"    Water: 2 - 24 oz/d + 8 = 56 oz/d       Past Surgical History:   Procedure Laterality Date    APPENDECTOMY      BREAST RECONSTRUCTION      augmentation    TONSILLECTOMY         Review of Systems   Constitutional:  Negative for chills, fatigue and fever.   HENT:  Negative for hearing loss.    Eyes:  Negative for photophobia and visual disturbance.   Respiratory:  Negative for cough, chest tightness, shortness of breath and wheezing.    Cardiovascular:  Negative for chest pain and palpitations.   Gastrointestinal:  Negative for " "constipation, diarrhea, nausea and vomiting.   Endocrine: Negative for cold intolerance and heat intolerance.   Genitourinary:  Negative for flank pain.   Musculoskeletal:  Positive for gait problem. Negative for neck pain and neck stiffness.   Neurological:  Negative for light-headedness and headaches.   Psychiatric/Behavioral:  Negative for sleep disturbance.        Objective:   Ht 5' 7" (1.702 m)   Wt 90.9 kg (200 lb 6.4 oz)   BMI 31.39 kg/m²       Physical Exam   LOWER EXTREMITY PHYSICAL EXAMINATION  NEUROLOGY: Proprioception is intact. Sensation to light touch is intact. Negative Tinel's Sign and negative Valleix Sign. No neurological sensations with compression of the area of Hendricks's Nerve in the area of the Abductor Hallucis muscle belly.    ORTHOPEDIC: There is moderate tenderness to palpation of the area around the plantar medial calcaneal tubercle on the left foot. Pains are characterized as sharp and stabbing-like with direct palpation of the area. There is also moderate pain to palpation of the immediate plantar aspect of the heel, and mild pains to the lateral band of the fascia. No edema is noted. No fullness is noted. No pains or defects are noted within the plantar fascia at the arch. No plantar fibromas are noted. No defects are noted within the ligament. Dorsiflexion of the MTPJs with simultaneous palpation of the fascia at the arch, does worsen and exacerbate the pains. No pains with medial to lateral compression of the heel. Equinus contracture is noted. Antalgic gait pattern is noted.     DERMATOLOGY: Skin is supple, dry and intact.    Assessment:     1. Plantar fasciitis    2. Pain in left foot    3. Contracture, left ankle          Plan:     Plantar fasciitis  -     methylPREDNISolone acetate injection 40 mg  -     dexamethasone injection 4 mg    Pain in left foot    Contracture, left ankle    Thorough discussion is had with the patient today, concerning the diagnosis, its etiology, and the " treatment algorithm at present.     Following sterile preparation with alcohol swab and/or betadine paint, injection was given into and around the area of the left plantar medial calcaneal tubercle, using 25-gauge needle. Injection consisted of approximately 0.5cc of Dexamethasone Sodium Phosphate, 0.5cc of Depo-Medrol (40mg/dL) and 0.5cc 0.50% Marcaine w/ Epinephrine. Bandage application thereafter. Patient tolerated procedure well, and without complications or complaints. Patient educated that the area of pathology, might actually be slightly more painful, due to the injection, over the course of the next one to two days.       Did discuss proper and supportive shoe gear in detail and at length with the patient.  These are shoes with firm and robust arch support; medial counter.  Shoes which only bend at the metatarsophalangeal joint and which are rigid in the midfoot and hindfoot. Patient urged to purchase running type or cross training type shoes gear which are designed for pronation control.     Stretching exercises are discussed, taught, and are demonstrated to the patient this afternoon due to concomitant diagnosis of equinus contracture. The relationship between equinus contracture and the other aforementioned pathologies are detailed and outlined to the patient. The patient does acknowledge understanding, and we will embark on a vigorous stretching algorithm for the lower extremity.    Prescription is written for Orthotist evaluation at [facility], [address] [place] [address], for lower extremity orthotic(s) management and/or shoe gear management.          Future Appointments   Date Time Provider Department Center   10/4/2022  2:40 PM Negin Perez MD [facility]   10/5/2022  3:15 PM PULMONARY LAB, [facility]   10/11/2022  8:00 AM Anupam Paul LCSW [facility]   [facility]20[facility]

## 2022-10-04 NOTE — TELEPHONE ENCOUNTER
----- Message from Dorothy Courtney sent at 10/4/2022 10:21 AM CDT -----  Please call pt @ [phone] regarding an order to get a mammograph, pt will be 11/10.

## 2022-10-11 ENCOUNTER — CLINICAL SUPPORT (OUTPATIENT)
Dept: AUDIOLOGY | Facility: CLINIC | Age: 42
End: 2022-10-11
Payer: OTHER GOVERNMENT

## 2022-10-11 ENCOUNTER — OFFICE VISIT (OUTPATIENT)
Dept: OTOLARYNGOLOGY | Facility: CLINIC | Age: 42
End: 2022-10-11
Payer: OTHER GOVERNMENT

## 2022-10-11 VITALS — WEIGHT: 204.81 LBS | TEMPERATURE: 97 F | BODY MASS INDEX: 32.08 KG/M2

## 2022-10-11 DIAGNOSIS — H90.41 SENSORINEURAL HEARING LOSS (SNHL) OF RIGHT EAR WITH UNRESTRICTED HEARING OF LEFT EAR: Primary | ICD-10-CM

## 2022-10-11 DIAGNOSIS — H90.41 SENSORINEURAL HEARING LOSS (SNHL) OF RIGHT EAR WITH UNRESTRICTED HEARING OF LEFT EAR: ICD-10-CM

## 2022-10-11 DIAGNOSIS — H93.11 TINNITUS AURIUM, RIGHT: ICD-10-CM

## 2022-10-11 DIAGNOSIS — H93.8X1 SENSATION OF FULLNESS IN EAR, RIGHT: ICD-10-CM

## 2022-10-11 DIAGNOSIS — H93.13 TINNITUS, BILATERAL: ICD-10-CM

## 2022-10-11 DIAGNOSIS — H90.3 SENSORINEURAL HEARING LOSS, ASYMMETRICAL: Primary | ICD-10-CM

## 2022-10-11 PROCEDURE — 99213 OFFICE O/P EST LOW 20 MIN: CPT | Mod: PBBFAC | Performed by: OTOLARYNGOLOGY

## 2022-10-11 PROCEDURE — 92570 ACOUSTIC IMMITANCE TESTING: CPT | Mod: PBBFAC | Performed by: AUDIOLOGIST-HEARING AID FITTER

## 2022-10-11 PROCEDURE — 99999 PR PBB SHADOW E&M-EST. PATIENT-LVL I: CPT | Mod: PBBFAC,,, | Performed by: AUDIOLOGIST-HEARING AID FITTER

## 2022-10-11 PROCEDURE — 92557 COMPREHENSIVE HEARING TEST: CPT | Mod: PBBFAC | Performed by: AUDIOLOGIST-HEARING AID FITTER

## 2022-10-11 PROCEDURE — 99999 PR PBB SHADOW E&M-EST. PATIENT-LVL III: ICD-10-PCS | Mod: PBBFAC,,, | Performed by: OTOLARYNGOLOGY

## 2022-10-11 PROCEDURE — 99211 OFF/OP EST MAY X REQ PHY/QHP: CPT | Mod: PBBFAC,27 | Performed by: AUDIOLOGIST-HEARING AID FITTER

## 2022-10-11 PROCEDURE — 99203 OFFICE O/P NEW LOW 30 MIN: CPT | Mod: S$PBB,,, | Performed by: OTOLARYNGOLOGY

## 2022-10-11 PROCEDURE — 99203 PR OFFICE/OUTPT VISIT, NEW, LEVL III, 30-44 MIN: ICD-10-PCS | Mod: S$PBB,,, | Performed by: OTOLARYNGOLOGY

## 2022-10-11 PROCEDURE — 99999 PR PBB SHADOW E&M-EST. PATIENT-LVL I: ICD-10-PCS | Mod: PBBFAC,,, | Performed by: AUDIOLOGIST-HEARING AID FITTER

## 2022-10-11 PROCEDURE — 99999 PR PBB SHADOW E&M-EST. PATIENT-LVL III: CPT | Mod: PBBFAC,,, | Performed by: OTOLARYNGOLOGY

## 2022-10-11 NOTE — PROGRESS NOTES
Referring Provider:    No referring provider defined for this encounter.  Subjective:   Patient: Brionna Carrion [id number], :1980   Visit date:10/11/2022 12:22 PM    Chief Complaint:  decreased hearing  (States decreased hearing over last couple of years according to [employer] audio. States right ear feels full, clogged, and constantly ringing)    HPI:    Prior notes reviewed by myself.  Clinical documentation obtained by nursing staff reviewed.     41-year-old female presents for evaluation of progressive right-sided hearing loss.  She is in the [employer] and has yearly hearing tests.  They have been following an asymmetry between her right and left sides.  She had not noticed a significant difference in the hearing until the last few months and a hearing test through the [facility] confirmed that her asymmetry had become worse.  She is also having right greater than left-sided nonpulsatile tinnitus and aural fullness.  She denies any dizziness.  She has had a regular exposure to loud noise, but nothing more than usual over the last year and no obvious inciting event to her hearing loss.  She has not had any prior imaging.      Objective:     Physical Exam:  Vitals:  Temp 97.3 °F (36.3 °C) (Temporal)   Wt 92.9 kg (204 lb 12.9 oz)   LMP 2022 (Approximate)   BMI 32.08 kg/m²   General appearance:  Well developed, well nourished    Ears:  Otoscopy of external auditory canals and tympanic membranes was normal, clinical speech reception thresholds grossly intact, no mass/lesion of auricle.    Nose:  No masses/lesions of external nose, nasal mucosa, septum, and turbinates were within normal limits.    Mouth:  No mass/lesion of lips, teeth, gums, hard/soft palate, tongue, tonsils, or oropharynx.    Neck & Lymphatics:  No cervical lymphadenopathy, no neck mass/crepitus/ asymmetry, trachea is midline, no thyroid enlargement/tenderness/mass.        [x]  Data Reviewed:    Lab Results   Component Value Date    WBC 6.56  01/20/2022    HGB 12.0 01/20/2022    HCT 34.3 (L) 01/20/2022    MCV 91 01/20/2022    EOSINOPHIL 0.9 01/20/2022         [x]  Independent interpretation of test:     Media Information  File Link    Annotation on 10/11/2022 12:15 PM by Josiane Tariq, AVIS-A: AUDIOGRAM        Key Information    Document ID File Type Document Type Description   [id number] Annotation Annotation AUDIOGRAM     Import Information    Attached At Date Time User Dept   Encounter Level 10/11/2022 12:15 PM Josiane Tariq, AVIS-A On Audiology     Encounter    Appointment on 10/11/22 with Josiane Tariq, AVIS-A             Assessment & Plan:   Sensorineural hearing loss (SNHL) of right ear with unrestricted hearing of left ear  -     MRI IAC/Temporal Bones W W/O Contrast; Future; Expected date: 10/11/2022    Tinnitus aurium, right      Progressive asymmetric sensorineural hearing loss with absent acoustic reflexes.  We discussed the possibility of retrocochlear pathology.  I recommended that we obtain an MRI to rule this out and she will follow up for results.

## 2022-10-11 NOTE — PROGRESS NOTES
Referring provider: Dr. Porfirio Carrion was seen 10/11/2022 for an audiological evaluation.  Patient complains of progressive decline in hearing for the right ear. She is in the [employer] and has yearly hearing tests.  They have been following an asymmetry between her right and left sides.  She had not noticed a significant difference in the hearing until the last few months and a hearing test through the [facility] confirmed that her asymmetry had become worse.  She is also having right greater than left-sided nonpulsatile tinnitus and aural fullness.  She denies any dizziness.  She has had a regular exposure to loud noise, but nothing more than usual over the last year and no obvious inciting event to her hearing loss.  She has not had any prior imaging.    Results reveal a mild-to-severe sensorineural hearing loss 250-8000 Hz for the right ear, and a normal hearing sloping to a mild hearing loss at 8000 Hz for the left ear.   Speech Reception Thresholds were 20 dBHL for the right ear and 20 dBHL for the left ear.   Word recognition scores were excellent for the right ear and excellent for the left ear.   Tympanograms were Type A for the right ear and Type A for the left ear. Acoustic reflexes were abnormal for the right ear and normal for the left ear.     Patient was counseled on the above findings.    Recommendations:  ENT

## 2022-11-18 ENCOUNTER — PATIENT MESSAGE (OUTPATIENT)
Dept: PSYCHIATRY | Facility: CLINIC | Age: 42
End: 2022-11-18
Payer: OTHER GOVERNMENT

## 2022-11-23 ENCOUNTER — TELEPHONE (OUTPATIENT)
Dept: PSYCHIATRY | Facility: CLINIC | Age: 42
End: 2022-11-23

## 2022-11-28 ENCOUNTER — TELEPHONE (OUTPATIENT)
Dept: PSYCHIATRY | Facility: CLINIC | Age: 42
End: 2022-11-28

## 2022-12-07 ENCOUNTER — PATIENT OUTREACH (OUTPATIENT)
Dept: ADMINISTRATIVE | Facility: HOSPITAL | Age: 42
End: 2022-12-07
Payer: OTHER GOVERNMENT

## 2022-12-07 NOTE — PROGRESS NOTES
Working [facility] Panel Report:     Pt is paneled to [facility]. Called to discuss scheduling an appointment with PCP to establish care. Changed PCP to Dr. Stefan Garibay.. Last visit 02/2022

## 2022-12-16 ENCOUNTER — TELEPHONE (OUTPATIENT)
Dept: PSYCHIATRY | Facility: CLINIC | Age: 42
End: 2022-12-16
Payer: OTHER GOVERNMENT

## 2022-12-17 ENCOUNTER — OFFICE VISIT (OUTPATIENT)
Dept: URGENT CARE | Facility: CLINIC | Age: 42
End: 2022-12-17
Payer: OTHER GOVERNMENT

## 2022-12-17 VITALS
SYSTOLIC BLOOD PRESSURE: 116 MMHG | WEIGHT: 204 LBS | TEMPERATURE: 98 F | OXYGEN SATURATION: 97 % | HEART RATE: 64 BPM | RESPIRATION RATE: 18 BRPM | DIASTOLIC BLOOD PRESSURE: 76 MMHG | BODY MASS INDEX: 32.02 KG/M2 | HEIGHT: 67 IN

## 2022-12-17 DIAGNOSIS — M54.50 ACUTE LEFT-SIDED LOW BACK PAIN WITHOUT SCIATICA: Primary | ICD-10-CM

## 2022-12-17 LAB
B-HCG UR QL: NEGATIVE
BILIRUB UR QL STRIP: NEGATIVE
CTP QC/QA: YES
GLUCOSE UR QL STRIP: NEGATIVE
KETONES UR QL STRIP: NEGATIVE
LEUKOCYTE ESTERASE UR QL STRIP: NEGATIVE
PH, POC UA: 6.5
POC BLOOD, URINE: NEGATIVE
POC NITRATES, URINE: NEGATIVE
PROT UR QL STRIP: NEGATIVE
SP GR UR STRIP: 1.01 (ref 1–1.03)
UROBILINOGEN UR STRIP-ACNC: NORMAL (ref 0.1–1.1)

## 2022-12-17 PROCEDURE — 99214 PR OFFICE/OUTPT VISIT, EST, LEVL IV, 30-39 MIN: ICD-10-PCS | Mod: 25,S$GLB,, | Performed by: PHYSICIAN ASSISTANT

## 2022-12-17 PROCEDURE — 96372 THER/PROPH/DIAG INJ SC/IM: CPT | Mod: S$GLB,,, | Performed by: PHYSICIAN ASSISTANT

## 2022-12-17 PROCEDURE — 81025 URINE PREGNANCY TEST: CPT | Mod: S$GLB,,, | Performed by: PHYSICIAN ASSISTANT

## 2022-12-17 PROCEDURE — 96372 PR INJECTION,THERAP/PROPH/DIAG2ST, IM OR SUBCUT: ICD-10-PCS | Mod: S$GLB,,, | Performed by: PHYSICIAN ASSISTANT

## 2022-12-17 PROCEDURE — 81003 URINALYSIS AUTO W/O SCOPE: CPT | Mod: QW,S$GLB,, | Performed by: PHYSICIAN ASSISTANT

## 2022-12-17 PROCEDURE — 81025 POCT URINE PREGNANCY: ICD-10-PCS | Mod: S$GLB,,, | Performed by: PHYSICIAN ASSISTANT

## 2022-12-17 PROCEDURE — 81003 POCT URINALYSIS, DIPSTICK, AUTOMATED, W/O SCOPE: ICD-10-PCS | Mod: QW,S$GLB,, | Performed by: PHYSICIAN ASSISTANT

## 2022-12-17 PROCEDURE — 99214 OFFICE O/P EST MOD 30 MIN: CPT | Mod: 25,S$GLB,, | Performed by: PHYSICIAN ASSISTANT

## 2022-12-17 RX ORDER — KETOROLAC TROMETHAMINE 30 MG/ML
30 INJECTION, SOLUTION INTRAMUSCULAR; INTRAVENOUS
Status: COMPLETED | OUTPATIENT
Start: 2022-12-17 | End: 2022-12-17

## 2022-12-17 RX ORDER — METHOCARBAMOL 500 MG/1
500 TABLET, FILM COATED ORAL 2 TIMES DAILY
Qty: 20 TABLET | Refills: 0 | Status: SHIPPED | OUTPATIENT
Start: 2022-12-17 | End: 2022-12-27

## 2022-12-17 RX ADMIN — KETOROLAC TROMETHAMINE 30 MG: 30 INJECTION, SOLUTION INTRAMUSCULAR; INTRAVENOUS at 03:12

## 2022-12-17 NOTE — PATIENT INSTRUCTIONS
Toradol IM in clinic.  No NSAIDs for 24 hours after injection.  Robaxin sent to pharmacy.  No heavy lifting for a few days.  Rest and stretches encouraged.  Follow up as needed.  Go to the ER with new or worsening symptoms.

## 2022-12-17 NOTE — PROGRESS NOTES
"Subjective:       Patient ID: Brionna Carrion is a 41 y.o. female.    Vitals:  height is 5' 7" (1.702 m) and weight is 92.5 kg (204 lb). Her oral temperature is 97.8 °F (36.6 °C). Her blood pressure is 116/76 and her pulse is 64. Her respiration is 18 and oxygen saturation is 97%.     Chief Complaint: Back Pain    Pt states back spasms (T-spine, left side) started yesterday, mild, but have worsened.  Pt has hx of back spasms.  No known injury.  States she has tried Stretching and Naproxen with no relief.  No radiation to her lower extremities or numbness.  Denies urinary type symptoms.  No saddle anesthesia.  No fevers.    Back Pain  This is a chronic problem. The current episode started yesterday. The problem occurs constantly. The problem has been gradually worsening since onset. The pain is present in the thoracic spine. The quality of the pain is described as shooting. Radiates to: radiating outward, feels like a muscle is pulled. The pain is at a severity of 9/10. The pain is The same all the time. Pertinent negatives include no abdominal pain, bladder incontinence, bowel incontinence, chest pain, dysuria, fever, headaches, leg pain, numbness, paresis, paresthesias, pelvic pain, perianal numbness, tingling, weakness or weight loss. Treatments tried: Pt has massage today with no relief. The treatment provided no relief.     Constitution: Negative for fever.   Cardiovascular:  Negative for chest pain.   Gastrointestinal:  Negative for abdominal pain and bowel incontinence.   Genitourinary:  Negative for dysuria, bladder incontinence and pelvic pain.   Musculoskeletal:  Positive for back pain.   Neurological:  Negative for headaches and numbness.     Objective:      Physical Exam   Constitutional: She is oriented to person, place, and time. She appears well-developed. She is cooperative. No distress.   HENT:   Head: Normocephalic and atraumatic.   Nose: Nose normal.   Mouth/Throat: Oropharynx is clear and moist and " mucous membranes are normal.   Eyes: Conjunctivae and lids are normal.   Neck: Trachea normal and phonation normal. Neck supple.   Cardiovascular: Normal rate, regular rhythm, normal heart sounds and normal pulses.   Pulmonary/Chest: Effort normal and breath sounds normal.   Abdominal: Normal appearance and bowel sounds are normal. She exhibits no mass. Soft.   Musculoskeletal:         General: No deformity.      Thoracic back: Normal.      Lumbar back: Normal.   Neurological: She is alert and oriented to person, place, and time. She has normal strength and normal reflexes. No sensory deficit.   Skin: Skin is warm, dry, intact and not diaphoretic.   Psychiatric: Her speech is normal and behavior is normal. Judgment and thought content normal.   Nursing note and vitals reviewed.      Assessment:       1. Acute left-sided low back pain without sciatica          Plan:         Acute left-sided low back pain without sciatica  -     POCT Urinalysis, Dipstick, Automated, W/O Scope  -     POCT urine pregnancy  -     ketorolac injection 30 mg  -     methocarbamoL (ROBAXIN) 500 MG Tab; Take 1 tablet (500 mg total) by mouth 2 (two) times daily. for 10 days  Dispense: 20 tablet; Refill: 0         Results for orders placed or performed in visit on 12/17/22   POCT Urinalysis, Dipstick, Automated, W/O Scope   Result Value Ref Range    POC Blood, Urine Negative Negative    POC Bilirubin, Urine Negative Negative    POC Urobilinogen, Urine normal 0.1 - 1.1    POC Ketones, Urine Negative Negative    POC Protein, Urine Negative Negative    POC Nitrates, Urine Negative Negative    POC Glucose, Urine Negative Negative    pH, UA 6.5     POC Specific Gravity, Urine 1.015 1.003 - 1.029    POC Leukocytes, Urine Negative Negative     Negative UPT.    Toradol IM in clinic.  No NSAIDs for 24 hours after injection.  Robaxin sent to pharmacy.  No heavy lifting for a few days.  Rest and stretches encouraged.  Follow up as needed.  Go to the ER  with new or worsening symptoms.

## 2022-12-30 ENCOUNTER — HOSPITAL ENCOUNTER (EMERGENCY)
Facility: HOSPITAL | Age: 42
Discharge: HOME | End: 2022-12-30
Attending: FAMILY MEDICINE
Payer: OTHER GOVERNMENT

## 2022-12-30 ENCOUNTER — OFFICE VISIT (OUTPATIENT)
Dept: URGENT CARE | Facility: CLINIC | Age: 42
End: 2022-12-30
Payer: OTHER GOVERNMENT

## 2022-12-30 VITALS
HEIGHT: 67 IN | TEMPERATURE: 98 F | DIASTOLIC BLOOD PRESSURE: 78 MMHG | OXYGEN SATURATION: 97 % | BODY MASS INDEX: 32.01 KG/M2 | WEIGHT: 203.94 LBS | RESPIRATION RATE: 18 BRPM | SYSTOLIC BLOOD PRESSURE: 120 MMHG | HEART RATE: 71 BPM

## 2022-12-30 VITALS
SYSTOLIC BLOOD PRESSURE: 123 MMHG | TEMPERATURE: 98 F | RESPIRATION RATE: 16 BRPM | OXYGEN SATURATION: 96 % | HEART RATE: 70 BPM | DIASTOLIC BLOOD PRESSURE: 78 MMHG

## 2022-12-30 DIAGNOSIS — R10.33 PERIUMBILICAL ABDOMINAL PAIN: ICD-10-CM

## 2022-12-30 DIAGNOSIS — R35.0 URINARY FREQUENCY: ICD-10-CM

## 2022-12-30 DIAGNOSIS — N30.00 ACUTE CYSTITIS WITHOUT HEMATURIA: Primary | ICD-10-CM

## 2022-12-30 DIAGNOSIS — K42.9 UMBILICAL HERNIA WITHOUT OBSTRUCTION AND WITHOUT GANGRENE: ICD-10-CM

## 2022-12-30 DIAGNOSIS — N39.0 URINARY TRACT INFECTION WITHOUT HEMATURIA, SITE UNSPECIFIED: Primary | ICD-10-CM

## 2022-12-30 LAB
ALBUMIN SERPL BCP-MCNC: 4.2 G/DL (ref 3.5–5.2)
ALP SERPL-CCNC: 60 U/L (ref 55–135)
ALT SERPL W/O P-5'-P-CCNC: 19 U/L (ref 10–44)
ANION GAP SERPL CALC-SCNC: 13 MMOL/L (ref 8–16)
AST SERPL-CCNC: 45 U/L (ref 10–40)
B-HCG UR QL: NEGATIVE
BACTERIA #/AREA URNS HPF: ABNORMAL /HPF
BASOPHILS # BLD AUTO: 0.08 K/UL (ref 0–0.2)
BASOPHILS NFR BLD: 1 % (ref 0–1.9)
BILIRUB SERPL-MCNC: 0.3 MG/DL (ref 0.1–1)
BILIRUB UR QL STRIP: NEGATIVE
BILIRUB UR QL STRIP: NEGATIVE
BUN SERPL-MCNC: 12 MG/DL (ref 6–20)
CALCIUM SERPL-MCNC: 10 MG/DL (ref 8.7–10.5)
CHLORIDE SERPL-SCNC: 105 MMOL/L (ref 95–110)
CLARITY UR: CLEAR
CO2 SERPL-SCNC: 21 MMOL/L (ref 23–29)
COLOR UR: COLORLESS
CREAT SERPL-MCNC: 0.7 MG/DL (ref 0.5–1.4)
DIFFERENTIAL METHOD: ABNORMAL
EOSINOPHIL # BLD AUTO: 0.1 K/UL (ref 0–0.5)
EOSINOPHIL NFR BLD: 1.3 % (ref 0–8)
ERYTHROCYTE [DISTWIDTH] IN BLOOD BY AUTOMATED COUNT: 13.2 % (ref 11.5–14.5)
EST. GFR  (NO RACE VARIABLE): >60 ML/MIN/1.73 M^2
GLUCOSE SERPL-MCNC: 88 MG/DL (ref 70–110)
GLUCOSE UR QL STRIP: NEGATIVE
GLUCOSE UR QL STRIP: NEGATIVE
HCT VFR BLD AUTO: 38.6 % (ref 37–48.5)
HGB BLD-MCNC: 13.3 G/DL (ref 12–16)
HGB UR QL STRIP: NEGATIVE
IMM GRANULOCYTES # BLD AUTO: 0.02 K/UL (ref 0–0.04)
IMM GRANULOCYTES NFR BLD AUTO: 0.3 % (ref 0–0.5)
KETONES UR QL STRIP: NEGATIVE
KETONES UR QL STRIP: NEGATIVE
LEUKOCYTE ESTERASE UR QL STRIP: ABNORMAL
LEUKOCYTE ESTERASE UR QL STRIP: POSITIVE
LYMPHOCYTES # BLD AUTO: 3 K/UL (ref 1–4.8)
LYMPHOCYTES NFR BLD: 38.4 % (ref 18–48)
MCH RBC QN AUTO: 31.7 PG (ref 27–31)
MCHC RBC AUTO-ENTMCNC: 34.5 G/DL (ref 32–36)
MCV RBC AUTO: 92 FL (ref 82–98)
MICROSCOPIC COMMENT: ABNORMAL
MONOCYTES # BLD AUTO: 0.4 K/UL (ref 0.3–1)
MONOCYTES NFR BLD: 5.6 % (ref 4–15)
NEUTROPHILS # BLD AUTO: 4.2 K/UL (ref 1.8–7.7)
NEUTROPHILS NFR BLD: 53.4 % (ref 38–73)
NITRITE UR QL STRIP: NEGATIVE
NRBC BLD-RTO: 0 /100 WBC
PH UR STRIP: 6 [PH] (ref 5–8)
PH, POC UA: 7.5
PLATELET # BLD AUTO: 240 K/UL (ref 150–450)
PMV BLD AUTO: 10.4 FL (ref 9.2–12.9)
POC BLOOD, URINE: NEGATIVE
POC NITRATES, URINE: NEGATIVE
POTASSIUM SERPL-SCNC: 4.7 MMOL/L (ref 3.5–5.1)
PROT SERPL-MCNC: 8.8 G/DL (ref 6–8.4)
PROT UR QL STRIP: NEGATIVE
PROT UR QL STRIP: POSITIVE
RBC # BLD AUTO: 4.2 M/UL (ref 4–5.4)
SODIUM SERPL-SCNC: 139 MMOL/L (ref 136–145)
SP GR UR STRIP: 1 (ref 1–1.03)
SP GR UR STRIP: 1.01 (ref 1–1.03)
URN SPEC COLLECT METH UR: ABNORMAL
UROBILINOGEN UR STRIP-ACNC: ABNORMAL (ref 0.1–1.1)
UROBILINOGEN UR STRIP-ACNC: NEGATIVE EU/DL
WBC # BLD AUTO: 7.82 K/UL (ref 3.9–12.7)
WBC #/AREA URNS HPF: 9 /HPF (ref 0–5)
WBC CLUMPS URNS QL MICRO: ABNORMAL

## 2022-12-30 PROCEDURE — 85025 COMPLETE CBC W/AUTO DIFF WBC: CPT | Performed by: NURSE PRACTITIONER

## 2022-12-30 PROCEDURE — 81003 URINALYSIS AUTO W/O SCOPE: CPT | Mod: QW,S$GLB,, | Performed by: PHYSICIAN ASSISTANT

## 2022-12-30 PROCEDURE — 99285 EMERGENCY DEPT VISIT HI MDM: CPT | Mod: 25

## 2022-12-30 PROCEDURE — 99214 PR OFFICE/OUTPT VISIT, EST, LEVL IV, 30-39 MIN: ICD-10-PCS | Mod: S$GLB,,, | Performed by: PHYSICIAN ASSISTANT

## 2022-12-30 PROCEDURE — 80053 COMPREHEN METABOLIC PANEL: CPT | Performed by: NURSE PRACTITIONER

## 2022-12-30 PROCEDURE — 81003 POCT URINALYSIS, DIPSTICK, AUTOMATED, W/O SCOPE: ICD-10-PCS | Mod: QW,S$GLB,, | Performed by: PHYSICIAN ASSISTANT

## 2022-12-30 PROCEDURE — 99214 OFFICE O/P EST MOD 30 MIN: CPT | Mod: S$GLB,,, | Performed by: PHYSICIAN ASSISTANT

## 2022-12-30 PROCEDURE — 81025 URINE PREGNANCY TEST: CPT | Performed by: NURSE PRACTITIONER

## 2022-12-30 PROCEDURE — 81000 URINALYSIS NONAUTO W/SCOPE: CPT | Performed by: NURSE PRACTITIONER

## 2022-12-30 PROCEDURE — 25500020 PHARM REV CODE 255: Performed by: NURSE PRACTITIONER

## 2022-12-30 RX ORDER — SULFAMETHOXAZOLE AND TRIMETHOPRIM 800; 160 MG/1; MG/1
1 TABLET ORAL 2 TIMES DAILY
Qty: 6 TABLET | Refills: 0 | Status: SHIPPED | OUTPATIENT
Start: 2022-12-30 | End: 2023-01-02

## 2022-12-30 RX ORDER — CEPHALEXIN 500 MG/1
500 CAPSULE ORAL 2 TIMES DAILY
Qty: 14 CAPSULE | Refills: 0 | Status: SHIPPED | OUTPATIENT
Start: 2022-12-30 | End: 2023-01-06

## 2022-12-30 RX ADMIN — IOHEXOL 100 ML: 350 INJECTION, SOLUTION INTRAVENOUS at 07:12

## 2022-12-30 NOTE — FIRST PROVIDER EVALUATION
Medical screening examination initiated.  I have conducted a focused provider triage encounter, findings are as follows:    Brief history of present illness Patient presents to ER for abd pain with possible umbilical hernia.    Vitals:    12/30/22 1529 12/30/22 1531   BP: 123/78    BP Location: Left arm    Patient Position: Sitting    Pulse: 70    Resp: 16    Temp:  98.2 °F (36.8 °C)   TempSrc: Oral Oral   SpO2: 96%        Pertinent physical exam:  no acute distress    Brief workup plan:  labs, imaging, bed for examination.    Preliminary workup initiated; this workup will be continued and followed by the physician or advanced practice provider that is assigned to the patient when roomed.

## 2022-12-30 NOTE — PROGRESS NOTES
"Subjective:       Patient ID: Brionna Carrion is a 42 y.o. female.    Vitals:  height is 5' 7" (1.702 m) and weight is 92.5 kg (203 lb 14.8 oz). Her oral temperature is 97.5 °F (36.4 °C). Her blood pressure is 120/78 and her pulse is 71. Her respiration is 18 and oxygen saturation is 97%.     Chief Complaint: Abdominal Pain    Abdominal pain, RLQ.  Pt states she was told there was a hardness there and she should get it checked out.  Didn't because it wasn't bothering her.  Now it's hurts.  Pt felt heat on her back last night, when laying on her back.  She can tolerate lying on her right side, but reports a "pulling" sensation when lying her left side.  Area is tender to touch.  Pt states she had urinary frequency and blood in her urine, but it went away.    Abdominal Pain  This is a new problem. The current episode started yesterday. The onset quality is sudden. The problem occurs constantly. The problem has been unchanged. The pain is located in the RLQ. The pain is at a severity of 7/10. The pain is moderate. Quality: when she lifts somthing or bends over gets sharp stabbing pain. The abdominal pain does not radiate. Associated symptoms include headaches. Pertinent negatives include no anorexia, arthralgias, belching, constipation, diarrhea, dysuria, fever, flatus, frequency, hematochezia, hematuria, melena, myalgias, nausea, vomiting or weight loss. The pain is aggravated by certain positions, movement and palpation. The pain is relieved by Nothing. She has tried nothing for the symptoms. There is no history of abdominal surgery, colon cancer, Crohn's disease, gallstones, GERD, irritable bowel syndrome, pancreatitis, PUD or ulcerative colitis. Patient's medical history does not include kidney stones and UTI.     Constitution: Negative for fever.   Gastrointestinal:  Positive for abdominal pain. Negative for history of abdominal surgery, nausea, vomiting, constipation, diarrhea and bright red blood in stool. "   Genitourinary:  Negative for dysuria, frequency and hematuria.   Musculoskeletal:  Negative for joint pain and muscle ache.   Neurological:  Positive for headaches.     Objective:      Physical Exam   Constitutional: She is oriented to person, place, and time. She appears well-developed. She is cooperative.  Non-toxic appearance. She does not appear ill. No distress.   HENT:   Head: Normocephalic and atraumatic.   Ears:   Right Ear: Hearing, tympanic membrane, external ear and ear canal normal.   Left Ear: Hearing, tympanic membrane, external ear and ear canal normal.   Nose: Nose normal. No mucosal edema, rhinorrhea or nasal deformity. No epistaxis. Right sinus exhibits no maxillary sinus tenderness and no frontal sinus tenderness. Left sinus exhibits no maxillary sinus tenderness and no frontal sinus tenderness.   Mouth/Throat: Uvula is midline, oropharynx is clear and moist and mucous membranes are normal. No trismus in the jaw. Normal dentition. No uvula swelling. No posterior oropharyngeal erythema.   Eyes: Conjunctivae and lids are normal. Right eye exhibits no discharge. Left eye exhibits no discharge. No scleral icterus.   Neck: Trachea normal and phonation normal. Neck supple.   Cardiovascular: Normal rate, regular rhythm, normal heart sounds and normal pulses.   Pulmonary/Chest: Effort normal and breath sounds normal. No respiratory distress.   Abdominal: Normal appearance and bowel sounds are normal. She exhibits no distension and no mass. Soft. There is no abdominal tenderness.     Musculoskeletal: Normal range of motion.         General: No deformity. Normal range of motion.   Neurological: She is alert and oriented to person, place, and time. She exhibits normal muscle tone. Coordination normal.   Skin: Skin is warm, dry, intact, not diaphoretic and not pale.   Psychiatric: Her speech is normal and behavior is normal. Judgment and thought content normal.   Nursing note and vitals reviewed.       Assessment:       1. Urinary tract infection without hematuria, site unspecified    2. Periumbilical abdominal pain    3. Urinary frequency        Results for orders placed or performed in visit on 12/30/22   POCT Urinalysis, Dipstick, Automated, W/O Scope   Result Value Ref Range    POC Blood, Urine Negative Negative    POC Bilirubin, Urine Negative Negative    POC Urobilinogen, Urine nog 0.1 - 1.1    POC Ketones, Urine Negative Negative    POC Protein, Urine Positive (A) Negative    POC Nitrates, Urine Negative Negative    POC Glucose, Urine Negative Negative    pH, UA 7.5     POC Specific Gravity, Urine 1.015 1.003 - 1.029    POC Leukocytes, Urine Positive (A) Negative       Plan:       Discussed medication being prescribed.  Advised patient to follow up with PCP or go to the ED if abdominal pain worsens.  A strangulated hernia might need to be ruled out.  Patient verbalized understanding, agrees with the plan, and is comfortable with discharge.    Urinary tract infection without hematuria, site unspecified  -     cephALEXin (KEFLEX) 500 MG capsule; Take 1 capsule (500 mg total) by mouth 2 (two) times a day. for 7 days  Dispense: 14 capsule; Refill: 0    Periumbilical abdominal pain  -     POCT Urinalysis, Dipstick, Automated, W/O Scope    Urinary frequency  -     POCT Urinalysis, Dipstick, Automated, W/O Scope

## 2022-12-31 NOTE — ED PROVIDER NOTES
Encounter Date: 12/30/2022       History     Chief Complaint   Patient presents with    Abdominal Pain     Sent from  for eval of possible umbilical hernia.      Patient is a 42-year-old female who presents with complaints of lower abdominal pain and bilateral back pain.  Patient was sent to the ER from urgent care for evaluation of possible umbilical hernia.  Patient states that she is been told that she is had runny in the past but states that it has never caused her any problems.  Patient denies any urinary symptoms including dysuria, hematuria.  Patient shows no signs distress at this time.    Review of patient's allergies indicates:   Allergen Reactions    House dust mite Tinitus     Past Medical History:   Diagnosis Date    Abnormal Pap smear     HPV/ biopsy-- never had a normal pap    Allergy     year around; tried OTC Zyrtec, Allegra, which makes her sleepy; increased water has helped partially    Closed displaced fracture of proximal phalanx of lesser toe of left foot 8/7/2017    COVID-19 virus infection 7/21/2020    Diabetes mellitus 11/2020    BS doesn't check 04/25/2022    Family history of breast cancer in female     Early breast cancer on mother's side of the family    History of 2019 novel coronavirus disease (COVID-19) 9/22/2020 July 2020 December 2020    History of HPV infection 01/01/2002    Initially had abnormal PAPs, except for 2015.    Left ovarian cyst     2 cm per CT abn & Pelvis w/o at [facility] on 12/7/19     Past Surgical History:   Procedure Laterality Date    APPENDECTOMY      BREAST RECONSTRUCTION      augmentation    TONSILLECTOMY       Family History   Problem Relation Age of Onset    Hypertension Mother     Alcohol abuse Mother         Recovered    Depression Mother     Drug abuse Mother         Recovered    Alcohol abuse Father     Drug abuse Father         Active    Breast cancer Maternal Grandmother     Hypertension Maternal Grandmother     Glaucoma Maternal Grandmother      Cataracts Maternal Grandmother     Vision loss Maternal Grandmother         Went blind at 25    Breast cancer Maternal Aunt     Breast cancer Maternal Aunt     Breast cancer Maternal Aunt     Breast cancer Maternal Aunt     Alcohol abuse Sister         Recovered    Drug abuse Sister         Recovered    Ovarian cancer Neg Hx     Stroke Neg Hx      Social History     Tobacco Use    Smoking status: Never    Smokeless tobacco: Never   Substance Use Topics    Alcohol use: Yes     Alcohol/week: 0.0 standard drinks     Comment: 2 drinks every 2 weeks    Drug use: Never     Review of Systems   Constitutional:  Negative for fever.   HENT:  Negative for sore throat.    Respiratory:  Negative for shortness of breath.    Cardiovascular:  Negative for chest pain.   Gastrointestinal:  Positive for abdominal pain. Negative for nausea.   Genitourinary:  Positive for flank pain (bilateral). Negative for dysuria.   Musculoskeletal:  Negative for back pain.   Skin:  Negative for rash.   Neurological:  Negative for weakness.   Hematological:  Does not bruise/bleed easily.     Physical Exam     Initial Vitals   BP Pulse Resp Temp SpO2   12/30/22 1529 12/30/22 1529 12/30/22 1529 12/30/22 1531 12/30/22 1529   123/78 70 16 98.2 °F (36.8 °C) 96 %      MAP       --                Physical Exam    Nursing note and vitals reviewed.  Constitutional: She appears well-developed and well-nourished.   HENT:   Head: Normocephalic and atraumatic.   Eyes: EOM are normal. Pupils are equal, round, and reactive to light.   Neck: Neck supple.   Normal range of motion.  Cardiovascular:  Normal rate, regular rhythm, normal heart sounds and intact distal pulses.           Pulmonary/Chest: Breath sounds normal.   Abdominal: Abdomen is soft. Bowel sounds are normal.       Musculoskeletal:         General: Normal range of motion.      Cervical back: Normal range of motion and neck supple.     Neurological: She is alert and oriented to person, place, and time.  She has normal strength and normal reflexes.   Skin: Skin is warm and dry.       ED Course   Procedures  Labs Reviewed   CBC W/ AUTO DIFFERENTIAL - Abnormal; Notable for the following components:       Result Value    MCH 31.7 (*)     All other components within normal limits   COMPREHENSIVE METABOLIC PANEL - Abnormal; Notable for the following components:    CO2 21 (*)     Total Protein 8.8 (*)     AST 45 (*)     All other components within normal limits   URINALYSIS, REFLEX TO URINE CULTURE - Abnormal; Notable for the following components:    Color, UA Colorless (*)     Leukocytes, UA 1+ (*)     All other components within normal limits    Narrative:     Specimen Source->Urine   URINALYSIS MICROSCOPIC - Abnormal; Notable for the following components:    WBC, UA 9 (*)     All other components within normal limits    Narrative:     Specimen Source->Urine   PREGNANCY TEST, URINE RAPID    Narrative:     Specimen Source->Urine          Imaging Results              CT Abdomen Pelvis With Contrast (Final result)  Result time 12/30/22 19:23:24      Final result by Jonas Liriano MD (12/30/22 19:23:24)                   Impression:      Borderline bladder wall thickening.  Correlation with urinalysis to exclude cystitis.    Peripherally enhancing right ovarian cystic lesion possibly simple or hemorrhagic cyst measuring 2.7 cm.  No free fluid.    Tiny fat containing umbilical hernia.    Additional details as above.    All CT scans at this facility are performed  using dose modulation techniques as appropriate to performed exam including the following:  automated exposure control; adjustment of mA and/or kV according to the patients size (this includes techniques or standardized protocols for targeted exams where dose is matched to indication/reason for exam: i.e. extremities or head);  iterative reconstruction technique.      Electronically signed by: Jonas Liriano  Date:    12/30/2022  Time:    19:23                Narrative:    EXAMINATION:  CT ABDOMEN PELVIS WITH CONTRAST    CLINICAL HISTORY:  Abdominal pain, acute, nonlocalized;    TECHNIQUE:  Low dose axial images, sagittal and coronal reformations were obtained from the lung bases to the pubic symphysis.  Contrast was administered.  Images acquired after the administration 100 mL Omnipaque 350 IV contrast.    COMPARISON:  None    FINDINGS:  Heart: Normal in size. No pericardial effusion.    Lung Bases: Well aerated, without consolidation or pleural fluid.    Liver: Normal in size and attenuation, with no focal hepatic lesions.    Gallbladder: No calcified gallstones.    Bile Ducts: No evidence of dilated ducts.    Pancreas: No mass or peripancreatic fat stranding.    Spleen: Unremarkable.    Adrenals: Unremarkable.    Kidneys/ Ureters: No hydronephrosis.  No obstructive uropathy.  No nonobstructive nephrolithiasis.    Bladder: Borderline bladder wall thickening.  Correlation with urinalysis.    Reproductive organs: Right ovarian peripherally enhancing cystic lesion possibly simple or hemorrhagic cyst measuring 2.7 cm.  No free fluid.    GI Tract/Mesentery: No evidence of bowel obstruction or inflammation.  Appendix appears surgically absent.    Peritoneal Space: No ascites. No free air.    Retroperitoneum: No significant adenopathy.    Abdominal wall: Tiny fat containing umbilical hernia.  Changes to the body wall related to prior abdominoplasty.    Vasculature: No significant atherosclerosis or aneurysm.    Bones: No acute fracture.                                       Medications   iohexoL (OMNIPAQUE 350) injection 100 mL (100 mLs Intravenous Given 12/30/22 1908)     Medical Decision Making:   ED Management:  I discussed with patient and/or family/caretaker that evaluation in the ED does not suggest any emergent or life threatening medical conditions requiring immediate intervention beyond what was provided in the ED, and I believe patient is safe for discharge.  Regardless, an unremarkable evaluation in the ED does not preclude the development or presence of a serious of life threatening condition. As such, patient was instructed to return immediately for any worsening or change in current symptoms.                          Clinical Impression:   Final diagnoses:  [N30.00] Acute cystitis without hematuria (Primary)  [K42.9] Umbilical hernia without obstruction and without gangrene        ED Disposition Condition    Discharge Stable          ED Prescriptions       Medication Sig Dispense Start Date End Date Auth. Provider    sulfamethoxazole-trimethoprim 800-160mg (BACTRIM DS) 800-160 mg Tab Take 1 tablet by mouth 2 (two) times daily. for 3 days 6 tablet 12/30/2022 1/2/2023 Tani Campbell NP          Follow-up Information       Follow up With Specialties Details Why Contact Info    Radha Griffiths MD Family Medicine  As needed [id number] [place] [id number]  [phone]               Tani Campbell NP  12/30/22 [id number]

## 2023-01-10 ENCOUNTER — TELEPHONE (OUTPATIENT)
Dept: PSYCHIATRY | Facility: CLINIC | Age: 43
End: 2023-01-10
Payer: OTHER GOVERNMENT

## 2023-01-17 ENCOUNTER — TELEPHONE (OUTPATIENT)
Dept: PSYCHIATRY | Facility: CLINIC | Age: 43
End: 2023-01-17
Payer: OTHER GOVERNMENT

## 2023-02-08 ENCOUNTER — OFFICE VISIT (OUTPATIENT)
Dept: FAMILY MEDICINE | Facility: CLINIC | Age: 43
End: 2023-02-08
Payer: OTHER GOVERNMENT

## 2023-02-08 DIAGNOSIS — J02.9 PHARYNGITIS, UNSPECIFIED ETIOLOGY: ICD-10-CM

## 2023-02-08 DIAGNOSIS — U07.1 COVID-19: Primary | ICD-10-CM

## 2023-02-08 PROCEDURE — 99213 PR OFFICE/OUTPT VISIT, EST, LEVL III, 20-29 MIN: ICD-10-PCS | Mod: 95,,, | Performed by: NURSE PRACTITIONER

## 2023-02-08 PROCEDURE — 99213 OFFICE O/P EST LOW 20 MIN: CPT | Mod: 95,,, | Performed by: NURSE PRACTITIONER

## 2023-02-08 RX ORDER — AMOXICILLIN 500 MG/1
500 TABLET, FILM COATED ORAL EVERY 12 HOURS
Qty: 20 TABLET | Refills: 0 | Status: SHIPPED | OUTPATIENT
Start: 2023-02-08 | End: 2023-02-18

## 2023-02-08 NOTE — PATIENT INSTRUCTIONS
Warm salt water gargles PRN  Tylenol OTC as directed  F/U with PCP ASAP  Report to ER immediately if symptoms worsen or persist

## 2023-02-08 NOTE — PROGRESS NOTES
Subjective:       Patient ID: rBionna Carrion is a 42 y.o. female.    Chief Complaint: No chief complaint on file.  The patient location is: [place]  The chief complaint leading to consultation is: Sore Throat    Visit type: audiovisual    Face to Face time with patient: 15 min   minutes of total time spent on the encounter, which includes face to face time and non-face to face time preparing to see the patient (eg, review of tests), Obtaining and/or reviewing separately obtained history, Documenting clinical information in the electronic or other health record, Independently interpreting results (not separately reported) and communicating results to the patient/family/caregiver, or Care coordination (not separately reported).         Each patient to whom he or she provides medical services by telemedicine is:  (1) informed of the relationship between the physician and patient and the respective role of any other health care provider with respect to management of the patient; and (2) notified that he or she may decline to receive medical services by telemedicine and may withdraw from such care at any time.    Notes:     Sore Throat   This is a new problem. The current episode started yesterday. The problem has been gradually worsening. The pain is worse on the left side. There has been no fever. The fever has been present for Less than 1 day. The pain is at a severity of 8/10. The pain is moderate. Associated symptoms include congestion, coughing, ear pain, headaches, a hoarse voice, neck pain, shortness of breath, stridor, swollen glands and trouble swallowing. Pertinent negatives include no abdominal pain, diarrhea, drooling, ear discharge, plugged ear sensation or vomiting. Associated symptoms comments: Pt states diagnosed with COVID-19 at urgent care; pt states has developed severe ST with exudate over the past 2 d. Declines additional medication for COVID-19 symptoms. Pt only requesting ST addressed via virtual  "visit today. She has had no exposure to strep or mono. She has tried NSAIDs for the symptoms. The treatment provided no relief.   Past Medical History:   Diagnosis Date    Abnormal Pap smear     HPV/ biopsy-- never had a normal pap    Allergy     year around; tried OTC Zyrtec, Allegra, which makes her sleepy; increased water has helped partially    Closed displaced fracture of proximal phalanx of lesser toe of left foot 8/7/2017    COVID-19 virus infection 7/21/2020    Diabetes mellitus 11/2020    BS doesn't check 04/25/2022    Family history of breast cancer in female     Early breast cancer on mother's side of the family    History of 2019 novel coronavirus disease (COVID-19) 9/22/2020 July 2020 December 2020    History of HPV infection 01/01/2002    Initially had abnormal PAPs, except for 2015.    Left ovarian cyst     2 cm per CT abn & Pelvis w/o at [facility] on 12/7/19     Social History     Socioeconomic History    Marital status:    Occupational History    Occupation: [employer]      Comment:  [employer]   Tobacco Use    Smoking status: Never    Smokeless tobacco: Never   Substance and Sexual Activity    Alcohol use: Yes     Alcohol/week: 0.0 standard drinks     Comment: 2 drinks every 2 weeks    Drug use: Never    Sexual activity: Yes     Partners: Male   Social History Narrative    Breakfast: Protein shake (grass fed & powder CLA), water, +5 hour energy drink throughout the day    Lunch: Meat: shrimp & 1/2 sweet potato; water     Dinner: Salad with black olives, olive oil, little salt; water    Snacks: None (no sodas or diet sodas)    Eats out: 1x/mo: [place]: grilled shrimp, sweet potato, part of a salad; water    PA: 45 min resistance 3x/wk, runs 5 mi 3-4 x/wk, yoga in the sauna "[employer]"    Water: 2 - 24 oz/d + 8 = 56 oz/d     Past Surgical History:   Procedure Laterality Date    APPENDECTOMY      BREAST RECONSTRUCTION      augmentation    TONSILLECTOMY         Review of Systems "   Constitutional: Negative.    HENT:  Positive for nasal congestion, ear pain, hoarse voice, sore throat and trouble swallowing. Negative for drooling and ear discharge.    Eyes: Negative.    Respiratory:  Positive for cough, shortness of breath and stridor.    Gastrointestinal: Negative.  Negative for abdominal pain, diarrhea and vomiting.   Endocrine: Negative.    Genitourinary: Negative.    Musculoskeletal:  Positive for neck pain.   Integumentary:  Negative.   Allergic/Immunologic: Negative.    Neurological:  Positive for headaches.   Psychiatric/Behavioral: Negative.         Objective:      Physical Exam  Constitutional:       Appearance: Normal appearance. She is ill-appearing.   Neurological:      Mental Status: She is alert.       Assessment:       Problem List Items Addressed This Visit    None  Visit Diagnoses       COVID-19    -  Primary    Pharyngitis, unspecified etiology                  Plan:           Diagnoses and all orders for this visit:    COVID-19  Pharyngitis, unspecified etiology        -     amoxicillin (AMOXIL) 500 MG Tab; Take 1 tablet (500 mg total) by mouth every 12 (twelve) hours. for 10 days  -     (Magic mouthwash) 1:1:1 diphenhydrAMINE(Benadryl) 12.5mg/5ml liq, aluminum & magnesium hydroxide-simethicone (Maalox), LIDOcaine viscous 2%; Swish and spit 5 mLs every 8 (eight) hours as needed. Gargle and spit. DO NOT SWALLOW  Warm salt water gargles PRN  Tylenol OTC as directed  F/U with PCP ASAP  Report to ER immediately if symptoms worsen or persist

## 2023-03-27 ENCOUNTER — TELEPHONE (OUTPATIENT)
Dept: PSYCHIATRY | Facility: CLINIC | Age: 43
End: 2023-03-27
Payer: OTHER GOVERNMENT

## 2023-03-29 ENCOUNTER — PATIENT MESSAGE (OUTPATIENT)
Dept: PSYCHIATRY | Facility: CLINIC | Age: 43
End: 2023-03-29
Payer: OTHER GOVERNMENT

## 2023-06-02 ENCOUNTER — OFFICE VISIT (OUTPATIENT)
Dept: PODIATRY | Facility: CLINIC | Age: 43
End: 2023-06-02
Payer: OTHER GOVERNMENT

## 2023-06-02 DIAGNOSIS — M79.672 PAIN IN LEFT FOOT: ICD-10-CM

## 2023-06-02 DIAGNOSIS — M24.572 CONTRACTURE, LEFT ANKLE: ICD-10-CM

## 2023-06-02 DIAGNOSIS — M72.2 PLANTAR FASCIITIS: Primary | ICD-10-CM

## 2023-06-02 DIAGNOSIS — M25.529 ELBOW PAIN: Primary | ICD-10-CM

## 2023-06-02 PROCEDURE — 20550 PR INJECT TENDON SHEATH/LIGAMENT: ICD-10-PCS | Mod: S$PBB,LT,, | Performed by: PODIATRIST

## 2023-06-02 PROCEDURE — 99999 PR PBB SHADOW E&M-EST. PATIENT-LVL III: CPT | Mod: PBBFAC,,, | Performed by: PODIATRIST

## 2023-06-02 PROCEDURE — 99999 PR PBB SHADOW E&M-EST. PATIENT-LVL III: ICD-10-PCS | Mod: PBBFAC,,, | Performed by: PODIATRIST

## 2023-06-02 PROCEDURE — 20550 NJX 1 TENDON SHEATH/LIGAMENT: CPT | Mod: S$PBB,LT,, | Performed by: PODIATRIST

## 2023-06-02 PROCEDURE — 99213 OFFICE O/P EST LOW 20 MIN: CPT | Mod: 25,S$PBB,, | Performed by: PODIATRIST

## 2023-06-02 PROCEDURE — 99213 OFFICE O/P EST LOW 20 MIN: CPT | Mod: PBBFAC,25 | Performed by: PODIATRIST

## 2023-06-02 PROCEDURE — 20550 NJX 1 TENDON SHEATH/LIGAMENT: CPT | Mod: PBBFAC,LT | Performed by: PODIATRIST

## 2023-06-02 PROCEDURE — 99213 PR OFFICE/OUTPT VISIT, EST, LEVL III, 20-29 MIN: ICD-10-PCS | Mod: 25,S$PBB,, | Performed by: PODIATRIST

## 2023-06-02 RX ORDER — METHYLPREDNISOLONE ACETATE 40 MG/ML
40 INJECTION, SUSPENSION INTRA-ARTICULAR; INTRALESIONAL; INTRAMUSCULAR; SOFT TISSUE
Status: COMPLETED | OUTPATIENT
Start: 2023-06-02 | End: 2023-06-02

## 2023-06-02 RX ORDER — DEXAMETHASONE SODIUM PHOSPHATE 4 MG/ML
4 INJECTION, SOLUTION INTRA-ARTICULAR; INTRALESIONAL; INTRAMUSCULAR; INTRAVENOUS; SOFT TISSUE ONCE
Status: COMPLETED | OUTPATIENT
Start: 2023-06-02 | End: 2023-06-02

## 2023-06-02 RX ADMIN — DEXAMETHASONE SODIUM PHOSPHATE 4 MG: 4 INJECTION INTRA-ARTICULAR; INTRALESIONAL; INTRAMUSCULAR; INTRAVENOUS; SOFT TISSUE at 12:06

## 2023-06-02 RX ADMIN — METHYLPREDNISOLONE ACETATE 40 MG: 40 INJECTION, SUSPENSION INTRALESIONAL; INTRAMUSCULAR; INTRASYNOVIAL; SOFT TISSUE at 12:06

## 2023-06-02 NOTE — PROGRESS NOTES
Subjective:       Patient ID: Brionna Carrion is a 42 y.o. female.    Chief Complaint: Foot Pain and Foot Problem (Pt states left foot pain ( plantar Fasciitis ) Pt states that she had injections before about six months ago. Pt states right feet is having to same pain as the left foot . Pt wants to know what else can be done besides the injections because like to run and workout. Pt states that the pain is a 8 on the pain scale.)      HPI: Brionna Carrion presents to the office today, for follow-up concerning plantar fasciitis of the left foot. At this time, pains are 6/10 and are described as moderaet in nature. States analgic gait pattern. States walking and standing is painful. To this juncture, patient has had 1 cortisone injections to the left foot. States no frequent NSAIDs. Patient's Primary Care Provider is Radha Griffiths MD.     Review of patient's allergies indicates:   Allergen Reactions    House dust mite Tinitus       Past Medical History:   Diagnosis Date    Abnormal Pap smear     HPV/ biopsy-- never had a normal pap    Allergy     year around; tried OTC Zyrtec, Allegra, which makes her sleepy; increased water has helped partially    Closed displaced fracture of proximal phalanx of lesser toe of left foot 8/7/2017    COVID-19 virus infection 7/21/2020    Diabetes mellitus 11/2020    BS doesn't check 04/25/2022    Family history of breast cancer in female     Early breast cancer on mother's side of the family    History of 2019 novel coronavirus disease (COVID-19) 9/22/2020 July 2020 December 2020    History of HPV infection 01/01/2002    Initially had abnormal PAPs, except for 2015.    Left ovarian cyst     2 cm per CT abn & Pelvis w/o at [facility] on 12/7/19       Family History   Problem Relation Age of Onset    Hypertension Mother     Alcohol abuse Mother         Recovered    Depression Mother     Drug abuse Mother         Recovered    Alcohol abuse Father     Drug abuse Father         Active    Breast  "cancer Maternal Grandmother     Hypertension Maternal Grandmother     Glaucoma Maternal Grandmother     Cataracts Maternal Grandmother     Vision loss Maternal Grandmother         Went blind at 25    Breast cancer Maternal Aunt     Breast cancer Maternal Aunt     Breast cancer Maternal Aunt     Breast cancer Maternal Aunt     Alcohol abuse Sister         Recovered    Drug abuse Sister         Recovered    Ovarian cancer Neg Hx     Stroke Neg Hx        Social History     Socioeconomic History    Marital status:    Occupational History    Occupation: Army      Comment: [employer]   Tobacco Use    Smoking status: Never    Smokeless tobacco: Never   Substance and Sexual Activity    Alcohol use: Yes     Alcohol/week: 0.0 standard drinks     Comment: 2 drinks every 2 weeks    Drug use: Never    Sexual activity: Yes     Partners: Male   Social History Narrative    Breakfast: Protein shake (grass fed & powder CLA), water, +5 hour energy drink throughout the day    Lunch: Meat: shrimp & 1/2 sweet potato; water     Dinner: Salad with black olives, olive oil, little salt; water    Snacks: None (no sodas or diet sodas)    Eats out: 1x/mo: [employer]: grilled shrimp, sweet potato, part of a salad; water    PA: 45 min resistance 3x/wk, runs 5 mi 3-4 x/wk, yoga in the sauna "[employer]"    Water: 2 - 24 oz/d + 8 = 56 oz/d       Past Surgical History:   Procedure Laterality Date    APPENDECTOMY      BREAST RECONSTRUCTION      augmentation    TONSILLECTOMY         Review of Systems   Constitutional:  Negative for chills, fatigue and fever.   HENT:  Negative for hearing loss.    Eyes:  Negative for photophobia and visual disturbance.   Respiratory:  Negative for cough, chest tightness, shortness of breath and wheezing.    Cardiovascular:  Negative for chest pain and palpitations.   Gastrointestinal:  Negative for constipation, diarrhea, nausea and vomiting.   Endocrine: Negative for cold intolerance and heat intolerance. "   Genitourinary:  Negative for flank pain.   Musculoskeletal:  Positive for gait problem. Negative for neck pain and neck stiffness.   Neurological:  Negative for light-headedness and headaches.   Psychiatric/Behavioral:  Negative for sleep disturbance.        Objective:   There were no vitals taken for this visit.    Physical Exam    LOWER EXTREMITY PHYSICAL EXAMINATION    NEUROLOGY: Negative Tinel's Sign and negative Valleix Sign. No neurological sensations with compression of the area of Hendricks's Nerve in the area of the Abductor Hallucis muscle belly.    ORTHOPEDIC: There is mild tenderness to palpation of the area around the plantar medial calcaneal tubercle on the left foot. Pains are characterized as sharp and stabbing-like with direct palpation of the area. There is also moderate pain to palpation of the immediate plantar aspect of the heel, and moderate pains to the lateral band of the fascia. No edema is noted. No fullness is noted. There are mild pains to palpation within the plantar fascia at the arch.No defects are noted within the plantar fascia at the arch. No plantar fibromas are noted. No defects are noted within the ligament. Dorsiflexion of the MTPJs with simultaneous palpation of the fascia at the arch, does worsen and exacerbate the pains. No pains with medial to lateral compression of the heel. Equinus contracture is noted. Antalgic gait pattern is noted.         Assessment:     1. Plantar fasciitis    2. Pain in left foot    3. Contracture, left ankle          Plan:     Plantar fasciitis  -     methylPREDNISolone acetate injection 40 mg  -     dexAMETHasone injection 4 mg    Pain in left foot    Contracture, left ankle      Thorough discussion is had with the patient today, concerning the diagnosis, its etiology, and the treatment algorithm at present.     Following sterile preparation with alcohol swab and/or betadine paint, injection was given into and around the area of the LLE plantar  medial calcaneal tubercle, using 25-gauge needle. Injection consisted of approximately 0.5cc of Dexamethasone Sodium Phosphate, 0.5cc of Depo-Medrol (40mg/dL) and 0.5cc 0.50% Marcaine w/ Epinephrine. Bandage application thereafter. Patient tolerated procedure well, and without complications or complaints. Patient educated that the area of pathology, might actually be slightly more painful, due to the injection, over the course of the next one to two days.       Did discuss proper and supportive shoe gear in detail and at length with the patient.  These are shoes with firm and robust arch support; medial counter.  Shoes which only bend at the metatarsophalangeal joint and which are rigid in the midfoot and hindfoot. Patient urged to purchase running type or cross training type shoes gear which are designed for pronation control.     Stretching exercises are discussed, taught, and are demonstrated to the patient this afternoon due to concomitant diagnosis of equinus contracture. The relationship between equinus contracture and the other aforementioned pathologies are detailed and outlined to the patient. The patient does acknowledge understanding, and we will embark on a vigorous stretching algorithm for the lower extremity.           Future Appointments   Date Time Provider Department Center   6/7/2023  3:30 PM Benigno Alberto PA-C [facility]

## 2023-07-07 ENCOUNTER — PATIENT MESSAGE (OUTPATIENT)
Dept: INFECTIOUS DISEASES | Facility: CLINIC | Age: 43
End: 2023-07-07
Payer: OTHER GOVERNMENT

## 2023-07-12 ENCOUNTER — OFFICE VISIT (OUTPATIENT)
Dept: URGENT CARE | Facility: CLINIC | Age: 43
End: 2023-07-12
Payer: OTHER GOVERNMENT

## 2023-07-12 ENCOUNTER — TELEPHONE (OUTPATIENT)
Dept: URGENT CARE | Facility: CLINIC | Age: 43
End: 2023-07-12

## 2023-07-12 VITALS
WEIGHT: 196 LBS | RESPIRATION RATE: 20 BRPM | DIASTOLIC BLOOD PRESSURE: 76 MMHG | SYSTOLIC BLOOD PRESSURE: 128 MMHG | TEMPERATURE: 98 F | HEART RATE: 80 BPM | OXYGEN SATURATION: 97 % | BODY MASS INDEX: 30.76 KG/M2 | HEIGHT: 67 IN

## 2023-07-12 DIAGNOSIS — Z72.51 UNPROTECTED SEX: ICD-10-CM

## 2023-07-12 DIAGNOSIS — R09.89 SYMPTOMS OF UPPER RESPIRATORY INFECTION (URI): ICD-10-CM

## 2023-07-12 DIAGNOSIS — Z20.822 EXPOSURE TO CONFIRMED CASE OF COVID-19: ICD-10-CM

## 2023-07-12 DIAGNOSIS — N89.8 VAGINAL DISCHARGE: Primary | ICD-10-CM

## 2023-07-12 DIAGNOSIS — R21 RASH AND NONSPECIFIC SKIN ERUPTION: ICD-10-CM

## 2023-07-12 LAB
APPEARANCE UR: CLEAR
B-HCG UR QL: NEGATIVE
BACTERIA #/AREA URNS AUTO: ABNORMAL /HPF
BILIRUB UR QL STRIP.AUTO: NEGATIVE
BILIRUB UR QL STRIP: NEGATIVE
C TRACH DNA SPEC QL NAA+PROBE: NOT DETECTED
CLUE CELLS VAG QL WET PREP: ABNORMAL
COLOR UR: ABNORMAL
CTP QC/QA: YES
CTP QC/QA: YES
GLUCOSE UR QL STRIP.AUTO: NORMAL
GLUCOSE UR QL STRIP: NEGATIVE
HYALINE CASTS #/AREA URNS LPF: ABNORMAL /LPF
KETONES UR QL STRIP.AUTO: NEGATIVE
KETONES UR QL STRIP: NEGATIVE
LEUKOCYTE ESTERASE UR QL STRIP.AUTO: NEGATIVE
LEUKOCYTE ESTERASE UR QL STRIP: NEGATIVE
N GONORRHOEA DNA SPEC QL NAA+PROBE: NOT DETECTED
NITRITE UR QL STRIP.AUTO: NEGATIVE
PH UR STRIP.AUTO: 6 [PH]
PH, POC UA: 6.5
POC BLOOD, URINE: POSITIVE
POC NITRATES, URINE: NEGATIVE
PROT UR QL STRIP.AUTO: NEGATIVE
PROT UR QL STRIP: NEGATIVE
RBC #/AREA URNS AUTO: ABNORMAL /HPF
RBC UR QL AUTO: ABNORMAL
SARS-COV-2 RDRP RESP QL NAA+PROBE: NEGATIVE
SP GR UR STRIP.AUTO: 1.01
SP GR UR STRIP: 1.01 (ref 1–1.03)
SQUAMOUS #/AREA URNS LPF: ABNORMAL /HPF
T VAGINALIS VAG QL WET PREP: ABNORMAL
UROBILINOGEN UR STRIP-ACNC: 0.2 (ref 0.1–1.1)
UROBILINOGEN UR STRIP-ACNC: NORMAL
WBC #/AREA URNS AUTO: ABNORMAL /HPF
WBC #/AREA VAG WET PREP: ABNORMAL
YEAST SPEC QL WET PREP: ABNORMAL

## 2023-07-12 PROCEDURE — 99214 PR OFFICE/OUTPT VISIT, EST, LEVL IV, 30-39 MIN: ICD-10-PCS | Mod: S$PBB,,, | Performed by: NURSE PRACTITIONER

## 2023-07-12 PROCEDURE — 87210 SMEAR WET MOUNT SALINE/INK: CPT | Performed by: NURSE PRACTITIONER

## 2023-07-12 PROCEDURE — 87635 SARS-COV-2 COVID-19 AMP PRB: CPT | Mod: PBBFAC | Performed by: NURSE PRACTITIONER

## 2023-07-12 PROCEDURE — 99215 OFFICE O/P EST HI 40 MIN: CPT | Mod: PBBFAC | Performed by: NURSE PRACTITIONER

## 2023-07-12 PROCEDURE — 99214 OFFICE O/P EST MOD 30 MIN: CPT | Mod: S$PBB,,, | Performed by: NURSE PRACTITIONER

## 2023-07-12 PROCEDURE — 81003 URINALYSIS AUTO W/O SCOPE: CPT | Mod: 59,PBBFAC | Performed by: NURSE PRACTITIONER

## 2023-07-12 PROCEDURE — 63600175 PHARM REV CODE 636 W HCPCS: Performed by: NURSE PRACTITIONER

## 2023-07-12 PROCEDURE — 87591 N.GONORRHOEAE DNA AMP PROB: CPT | Performed by: NURSE PRACTITIONER

## 2023-07-12 PROCEDURE — 81001 URINALYSIS AUTO W/SCOPE: CPT | Performed by: NURSE PRACTITIONER

## 2023-07-12 PROCEDURE — 81025 URINE PREGNANCY TEST: CPT | Mod: PBBFAC | Performed by: NURSE PRACTITIONER

## 2023-07-12 PROCEDURE — 25000003 PHARM REV CODE 250: Performed by: NURSE PRACTITIONER

## 2023-07-12 RX ORDER — LIDOCAINE HYDROCHLORIDE 10 MG/ML
2 INJECTION INFILTRATION; PERINEURAL ONCE
Status: COMPLETED | OUTPATIENT
Start: 2023-07-12 | End: 2023-07-12

## 2023-07-12 RX ORDER — METRONIDAZOLE 500 MG/1
500 TABLET ORAL EVERY 12 HOURS
Qty: 14 TABLET | Refills: 0 | Status: SHIPPED | OUTPATIENT
Start: 2023-07-12 | End: 2023-07-19

## 2023-07-12 RX ORDER — CEFTRIAXONE 1 G/1
0.5 INJECTION, POWDER, FOR SOLUTION INTRAMUSCULAR; INTRAVENOUS
Status: COMPLETED | OUTPATIENT
Start: 2023-07-12 | End: 2023-07-12

## 2023-07-12 RX ORDER — TRIAMCINOLONE ACETONIDE 1 MG/G
OINTMENT TOPICAL 3 TIMES DAILY
Qty: 80 G | Refills: 0 | Status: SHIPPED | OUTPATIENT
Start: 2023-07-12 | End: 2023-11-30

## 2023-07-12 RX ADMIN — LIDOCAINE HYDROCHLORIDE 2 ML: 10 INJECTION, SOLUTION INFILTRATION; PERINEURAL at 01:07

## 2023-07-12 RX ADMIN — CEFTRIAXONE SODIUM 0.5 G: 1 INJECTION, POWDER, FOR SOLUTION INTRAMUSCULAR; INTRAVENOUS at 12:07

## 2023-07-12 NOTE — NURSING
Rocephin administed to Right Gluteal using aseptic technique. Patient observed for 15 minutes for reactions, none noted. Patient tolerated well.

## 2023-07-12 NOTE — PROGRESS NOTES
"Subjective:      Patient ID: Brionna Carrion is a 42 y.o. female.    Vitals:  height is 5' 7" (1.702 m) and weight is 88.9 kg (196 lb). Her oral temperature is 98.2 °F (36.8 °C). Her blood pressure is 128/76 and her pulse is 80. Her respiration is 20 and oxygen saturation is 97%.     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 Unsure of cause of rash. Over posterior neck, left underarm. States applied green alcohol and otc cortisone cream, no improvement. Peeling. 1 spot under left arm is painful/tender. Denies waxing, laser, new products, or sunburn.   ROS   Objective:     Physical Exam   Constitutional: She is oriented to person, place, and time. She does not appear ill. normal  HENT:   Nose: Nose normal.   Eyes: Conjunctivae are normal. Pupils are equal, round, and reactive to light.   Neck: Neck supple.   Pulmonary/Chest: Effort normal.   Abdominal: Normal appearance.   Genitourinary:         Comments: Deferred, self swabbed.     Musculoskeletal: Normal range of motion.         General: Normal range of motion.   Neurological: She is alert and oriented to person, place, and time.   Skin: Skin is rash.        Psychiatric: Her behavior is normal. Mood, judgment and thought content normal.   Nursing note and vitals reviewed.    Assessment:     1. Vaginal discharge    2. Symptoms of upper respiratory infection (URI)    3. Exposure to confirmed case of COVID-19    4. Unprotected sex    5. Rash and nonspecific skin eruption      Results for orders placed or performed in visit on 07/12/23   POCT Urinalysis, Dipstick, Automated, W/O Scope   Result Value Ref Range    POC Blood, Urine Positive (A) Negative    POC Bilirubin, Urine Negative Negative    POC Urobilinogen, Urine 0.2 0.1 - 1.1    POC Ketones, Urine Negative " Negative    POC Protein, Urine Negative Negative    POC Nitrates, Urine Negative Negative    POC Glucose, Urine Negative Negative    pH, UA 6.5     POC Specific Gravity, Urine 1.015 1.003 - 1.029    POC Leukocytes, Urine Negative Negative   POCT COVID-19 Rapid Screening   Result Value Ref Range    POC Rapid COVID Negative Negative     Acceptable Yes    POCT urine pregnancy   Result Value Ref Range    POC Preg Test, Ur Negative Negative     Acceptable Yes        Plan:       Vaginal discharge  -     Wet Prep, Genital  -     Chlamydia/GC, PCR  -     Urinalysis, Reflex to Urine Culture    Symptoms of upper respiratory infection (URI)  -     POCT Urinalysis, Dipstick, Automated, W/O Scope  -     Urinalysis, Reflex to Urine Culture  -     COVID/RSV/FLU A&B PCR    Exposure to confirmed case of COVID-19  -     POCT COVID-19 Rapid Screening  -     Urinalysis, Reflex to Urine Culture  -     COVID/RSV/FLU A&B PCR    Unprotected sex  -     POCT urine pregnancy  -     Urinalysis, Reflex to Urine Culture    Rash and nonspecific skin eruption    Other orders  -     cefTRIAXone injection 0.5 g  -     LIDOcaine HCL 10 mg/ml (1%) injection 2 mL  -     triamcinolone acetonide 0.1% (KENALOG) 0.1 % ointment; Apply topically 3 (three) times daily. Top with Aquaphor. Use sunscreen. for 7 days  Dispense: 80 g; Refill: 0        - doxycycline has been sent to your pharmacy to treat a potential bladder infection plus a potential chlamydia infection  - std tests from urine take approximately 1 day for results  - blood testing is a same day result  - you received treatment for a potential gonorrhea infection today as well, with an injection of rocephin  - do not have sex until you complete treatment and/or receive negative results  - partners should be notified and treated for any positives  - this clinic will call you for any ABNORMAL results, you will not receive a call if all results are negative/normal    See  patient education for guidance.  If you test too early for COVID, you can get a FALSE NEGATIVE result.  Stay home while you don't feel well.  Stay very hydrated.  Use OTC combo medications for cold/flu.  A PCR test is being done to double check you for COVID infection.   Result within 24 hours.    Results will post to your PORT[facility] over the next 1-5 days. Please check  your leni frequently for results and messages.   Nurse calls from our clinic can take 1-2 weeks even with abnormal results.   If something is urgent, we will call you today.

## 2023-07-12 NOTE — TELEPHONE ENCOUNTER
"----- Message from ADRIANA Leary sent at 7/12/2023  4:12 PM CDT -----  You have "BV". Bacterial Vaginosis is not an STD. It is a pH imbalance in the vagina that can cause discharge, odor, discomfort. Metronidazole (Flagyl) has been sent to your pharmacy. Take it twice a day for 7 days. Do not take bubble baths, use bath bombs, douches, or any scented tampons/pads. Do not drink any alcohol while on this medication and for 3 days after completing it, as that can cause violent illness in some people.    Flagyl sent to pharmacy - listed as [facility]?  "

## 2023-07-12 NOTE — PATIENT INSTRUCTIONS
- doxycycline has been sent to your pharmacy to treat a potential bladder infection plus a potential chlamydia infection  - std tests from urine take approximately 1 day for results  - blood testing is a same day result  - you received treatment for a potential gonorrhea infection today as well, with an injection of rocephin  - do not have sex until you complete treatment and/or receive negative results  - partners should be notified and treated for any positives  - this clinic will call you for any ABNORMAL results, you will not receive a call if all results are negative/normal    See patient education for guidance.  If you test too early for COVID, you can get a FALSE NEGATIVE result.  Stay home while you don't feel well.  Stay very hydrated.  Use OTC combo medications for cold/flu.  A PCR test is being done to double check you for COVID infection.   Result within 24 hours.    Results will post to your [facility] over the next 1-5 days. Please check  your leni frequently for results and messages.   Nurse calls from our clinic can take 1-2 weeks even with abnormal results.   If something is urgent, we will call you today.

## 2023-07-13 ENCOUNTER — TELEPHONE (OUTPATIENT)
Dept: URGENT CARE | Facility: CLINIC | Age: 43
End: 2023-07-13
Payer: OTHER GOVERNMENT

## 2023-07-13 NOTE — TELEPHONE ENCOUNTER
Called patient, no answer, message left on voicemail to contact office. Patient has seen her results and provider note on patient portal.

## 2023-07-14 ENCOUNTER — TELEPHONE (OUTPATIENT)
Dept: URGENT CARE | Facility: CLINIC | Age: 43
End: 2023-07-14
Payer: OTHER GOVERNMENT

## 2023-07-15 NOTE — TELEPHONE ENCOUNTER
----- Message from Nicki Martin sent at 7/14/2023 11:54 AM CDT -----  Patient received call . Please call [phone]

## 2023-07-25 ENCOUNTER — HOSPITAL ENCOUNTER (EMERGENCY)
Facility: HOSPITAL | Age: 43
Discharge: HOME | End: 2023-07-25
Attending: EMERGENCY MEDICINE
Payer: OTHER GOVERNMENT

## 2023-07-25 ENCOUNTER — OFFICE VISIT (OUTPATIENT)
Dept: URGENT CARE | Facility: CLINIC | Age: 43
End: 2023-07-25
Payer: OTHER GOVERNMENT

## 2023-07-25 VITALS
TEMPERATURE: 98 F | HEART RATE: 85 BPM | RESPIRATION RATE: 20 BRPM | HEIGHT: 67 IN | WEIGHT: 194 LBS | SYSTOLIC BLOOD PRESSURE: 109 MMHG | DIASTOLIC BLOOD PRESSURE: 59 MMHG | OXYGEN SATURATION: 96 % | BODY MASS INDEX: 30.45 KG/M2

## 2023-07-25 VITALS
WEIGHT: 194.25 LBS | HEART RATE: 74 BPM | BODY MASS INDEX: 30.49 KG/M2 | OXYGEN SATURATION: 98 % | SYSTOLIC BLOOD PRESSURE: 113 MMHG | RESPIRATION RATE: 16 BRPM | HEIGHT: 67 IN | TEMPERATURE: 98 F | DIASTOLIC BLOOD PRESSURE: 64 MMHG

## 2023-07-25 DIAGNOSIS — M54.9 BACK PAIN, UNSPECIFIED BACK LOCATION, UNSPECIFIED BACK PAIN LATERALITY, UNSPECIFIED CHRONICITY: Primary | ICD-10-CM

## 2023-07-25 DIAGNOSIS — M62.830 MUSCLE SPASM OF BACK: ICD-10-CM

## 2023-07-25 DIAGNOSIS — S39.012A BACK STRAIN, INITIAL ENCOUNTER: Primary | ICD-10-CM

## 2023-07-25 LAB
BILIRUB UR QL STRIP: NEGATIVE
GLUCOSE UR QL STRIP: NEGATIVE
KETONES UR QL STRIP: POSITIVE
LEUKOCYTE ESTERASE UR QL STRIP: NEGATIVE
PH, POC UA: 6.5
POC BLOOD, URINE: NEGATIVE
POC NITRATES, URINE: NEGATIVE
PROT UR QL STRIP: NEGATIVE
SP GR UR STRIP: 1.02 (ref 1–1.03)
UROBILINOGEN UR STRIP-ACNC: NORMAL (ref 0.1–1.1)

## 2023-07-25 PROCEDURE — 81003 POCT URINALYSIS, DIPSTICK, AUTOMATED, W/O SCOPE: ICD-10-PCS | Mod: QW,S$GLB,, | Performed by: NURSE PRACTITIONER

## 2023-07-25 PROCEDURE — 99213 PR OFFICE/OUTPT VISIT, EST, LEVL III, 20-29 MIN: ICD-10-PCS | Mod: S$GLB,,, | Performed by: NURSE PRACTITIONER

## 2023-07-25 PROCEDURE — 99213 OFFICE O/P EST LOW 20 MIN: CPT | Mod: S$GLB,,, | Performed by: NURSE PRACTITIONER

## 2023-07-25 PROCEDURE — 81003 URINALYSIS AUTO W/O SCOPE: CPT | Mod: QW,S$GLB,, | Performed by: NURSE PRACTITIONER

## 2023-07-25 PROCEDURE — 99284 EMERGENCY DEPT VISIT MOD MDM: CPT

## 2023-07-25 RX ORDER — KETOROLAC TROMETHAMINE 30 MG/ML
30 INJECTION, SOLUTION INTRAMUSCULAR; INTRAVENOUS
Status: DISCONTINUED | OUTPATIENT
Start: 2023-07-25 | End: 2023-07-25

## 2023-07-25 RX ORDER — PREDNISONE 50 MG/1
50 TABLET ORAL DAILY
Qty: 3 TABLET | Refills: 0 | Status: SHIPPED | OUTPATIENT
Start: 2023-07-25 | End: 2023-07-28

## 2023-07-25 RX ORDER — METAXALONE 800 MG/1
800 TABLET ORAL 3 TIMES DAILY PRN
Qty: 15 TABLET | Refills: 0 | OUTPATIENT
Start: 2023-07-25 | End: 2023-11-25

## 2023-07-25 RX ORDER — KETOROLAC TROMETHAMINE 10 MG/1
10 TABLET, FILM COATED ORAL EVERY 6 HOURS PRN
Qty: 15 TABLET | Refills: 0 | OUTPATIENT
Start: 2023-07-25 | End: 2023-08-26

## 2023-07-25 NOTE — PATIENT INSTRUCTIONS
Back Pain-Muscle Pain  Alternate heat and ice for first 48 hours then  apply heat. You may do gently stretching if tolerable.    Moist warm compresss to area several times daily.  May use a heating pad on LOW to provide heat over a towel which was dampended with warm water. DO NOT FALL ASLEEP WITH HEATING PAD ON.  Do not stay in one position to long.  When sleeping on your back place a pillow under knees to reduce tension on back.  If sleeping on your side, place pillow between knees to keep spine in better alinement.  Wear supportive shoes such as tennis shoes for support of the lower back.  Take any medication as directed.    Take the muscle relaxer as prescribed. It may cause drowsiness so do not operate a vehicle or machinery while taking this medication.     Apply over the counter lidocaine patches to the area for improved pain relief.     Apply over the counter voltarn gel to the area for improved pain relief.     Take arthritis strength Tylenol three times a day for pain in addition to the anti-inflammatory pain medication prescribed.     If you were not prescribed an anti-inflammatory medication, and if you do not have any history of stomach/intestinal ulcers, or kidney disease, or are not taking a blood thinner such as Coumadin, Plavix, Pradaxa, Eloquis, or Xaralta for example, it is OK to take over the counter Ibuprofen or Advil or Motrin or Aleve as directed.  Do not take these medications on an empty stomach.    If you were prescribed a narcotic medication, do not drive or operate heavy equipment or machinery while taking these medications.    If you lose control of your bowel and/or bladder, please go to the nearest Emergency Department immediately.  If you lose sensation in between your legs by your genitalia and/or rectum, please go to the nearest Emergency Department immediately.  If you lose control or sensation of any extremity, please go to the nearest Emergency Department immediately.

## 2023-07-25 NOTE — Clinical Note
"Brionna Benjamin" Murali was seen and treated in our emergency department on 7/25/2023.  She may return to work on 07/27/2023.       If you have any questions or concerns, please don't hesitate to call.      Benigno Meléndez NP"

## 2023-07-25 NOTE — PROGRESS NOTES
"Subjective:      Patient ID: Brionna Carrion is a 42 y.o. female.    Vitals:  height is 5' 7" (1.702 m) and weight is 88 kg (194 lb). Her temperature is 97.7 °F (36.5 °C). Her blood pressure is 109/59 (abnormal) and her pulse is 85. Her respiration is 20 and oxygen saturation is 96%.     Chief Complaint: Back Pain    Patient is a 43 yo female who presents for evaluation of back pain/spasms since she awoke at 3am. She denies trauma/injury. She was seen earlier today at the ER and prescribed oral muscle relaxer, steroids and NSAID. She took her first dose of medication about an hour ago and reports continued pain. She denies any fever, dysuria, saddle anesthesia, change in bowel/bladder function. She states pain is spasmodic in nature. She does report playing basketball 2 days ago but states she had no pain until this morning. No other concerns were voiced.     Back Pain  This is a new problem. The current episode started today. The problem occurs constantly. The problem is unchanged. The pain is present in the lumbar spine. Quality: sharp pulsating. The pain does not radiate. The pain is at a severity of 10/10. The pain is severe. The pain is The same all the time. Exacerbated by: movement. Stiffness is present All day. Pertinent negatives include no abdominal pain, bladder incontinence, bowel incontinence, chest pain, dysuria, fever, headaches, leg pain, numbness, paresis, paresthesias, pelvic pain, perianal numbness, tingling, weakness or weight loss. She has tried NSAIDs and muscle relaxant for the symptoms. The treatment provided no relief.     Constitution: Negative for chills and fever.   Cardiovascular:  Negative for chest pain.   Gastrointestinal:  Negative for abdominal pain, vomiting, constipation, diarrhea and bowel incontinence.   Genitourinary:  Negative for dysuria, urine decreased, bladder incontinence and pelvic pain.   Musculoskeletal:  Positive for back pain and muscle cramps.   Neurological:  Negative " for headaches, numbness, tingling and tremors.    Objective:     Physical Exam   Constitutional: She is oriented to person, place, and time. She appears well-developed. She is cooperative.   HENT:   Head: Normocephalic and atraumatic.   Ears:   Right Ear: Hearing and external ear normal.   Left Ear: Hearing and external ear normal.   Nose: Nose normal. No mucosal edema or nasal deformity. No epistaxis. Right sinus exhibits no maxillary sinus tenderness and no frontal sinus tenderness. Left sinus exhibits no maxillary sinus tenderness and no frontal sinus tenderness.   Mouth/Throat: Uvula is midline, oropharynx is clear and moist and mucous membranes are normal. No trismus in the jaw. Normal dentition. No uvula swelling.   Eyes: Conjunctivae and lids are normal.   Neck: Trachea normal and phonation normal. Neck supple.   Cardiovascular: Normal rate, regular rhythm, normal heart sounds and normal pulses.   Pulmonary/Chest: Effort normal and breath sounds normal.   Abdominal: Normal appearance and bowel sounds are normal. Soft.   Musculoskeletal: Normal range of motion.         General: Normal range of motion.      Thoracic back: Normal.      Lumbar back: She exhibits spasm. She exhibits no tenderness, no bony tenderness, no swelling and no deformity.      Comments: Patient reports decreased pain with palpation.    Neurological: She is alert and oriented to person, place, and time. She exhibits normal muscle tone.   Skin: Skin is warm, dry and intact.   Psychiatric: Her speech is normal and behavior is normal. Judgment and thought content normal.   Nursing note and vitals reviewed.    Assessment:     1. Back pain, unspecified back location, unspecified back pain laterality, unspecified chronicity    2. Muscle spasm of back        Plan:       Back pain, unspecified back location, unspecified back pain laterality, unspecified chronicity  -     POCT Urinalysis, Dipstick, Automated, W/O Scope    Muscle spasm of  back          Medical Decision Making:   History:   Old Records Summarized: records from clinic visits.       <> Summary of Records: Reviewed ER visit notes  Initial Assessment:   Nontoxic appearing 41 yo female c/o back pain with muscle spasm.  After complete evaluation, including thorough history and physical exam, the patient's symptoms are most likely due to muscular  injury. Urinalysis negative in clinic. There are no concerning features on physical exam to suggest fracture (no trauma, no bony tenderness to palpation), cauda equina (no bowel or urinary incontinence/retention, no saddle anesthesia, no distal weakness), AAA, viscus perforation, osteomyelitis or epidural abscess (no IVDU, vertebral tenderness), renal colic, pyelonephritis (afebrile, no CVAT, no urinary symptoms). Vital signs do not suggest sepsis. Imaging is negative for acute fracture or concerning findings. Patient is without signs of acute distress and vital signs are stable. Patient is discharged with instructions to continue prescription for muscle relaxant, anti-inflammatory pain relief, and recommendations for supportive care. Return/ER precautions were given.     Clinical Tests:   Lab Tests: Reviewed  The following lab test(s) were unremarkable: Urinalysis       <> Summary of Lab: Negative       Results for orders placed or performed in visit on 07/25/23   POCT Urinalysis, Dipstick, Automated, W/O Scope   Result Value Ref Range    POC Blood, Urine Negative Negative    POC Bilirubin, Urine Negative Negative    POC Urobilinogen, Urine normal 0.1 - 1.1    POC Ketones, Urine Positive (A) Negative    POC Protein, Urine Negative Negative    POC Nitrates, Urine Negative Negative    POC Glucose, Urine Negative Negative    pH, UA 6.5     POC Specific Gravity, Urine 1.020 1.003 - 1.029    POC Leukocytes, Urine Negative Negative     Patient Instructions   Back Pain-Muscle Pain  Alternate heat and ice for first 48 hours then  apply heat. You may do  gently stretching if tolerable.    Moist warm compresss to area several times daily.  May use a heating pad on LOW to provide heat over a towel which was dampended with warm water. DO NOT FALL ASLEEP WITH HEATING PAD ON.  Do not stay in one position to long.  When sleeping on your back place a pillow under knees to reduce tension on back.  If sleeping on your side, place pillow between knees to keep spine in better alinement.  Wear supportive shoes such as tennis shoes for support of the lower back.  Take any medication as directed.    Take the muscle relaxer as prescribed. It may cause drowsiness so do not operate a vehicle or machinery while taking this medication.     Apply over the counter lidocaine patches to the area for improved pain relief.     Apply over the counter voltarn gel to the area for improved pain relief.     Take arthritis strength Tylenol three times a day for pain in addition to the anti-inflammatory pain medication prescribed.     If you were not prescribed an anti-inflammatory medication, and if you do not have any history of stomach/intestinal ulcers, or kidney disease, or are not taking a blood thinner such as Coumadin, Plavix, Pradaxa, Eloquis, or Xaralta for example, it is OK to take over the counter Ibuprofen or Advil or Motrin or Aleve as directed.  Do not take these medications on an empty stomach.    If you were prescribed a narcotic medication, do not drive or operate heavy equipment or machinery while taking these medications.    If you lose control of your bowel and/or bladder, please go to the nearest Emergency Department immediately.  If you lose sensation in between your legs by your genitalia and/or rectum, please go to the nearest Emergency Department immediately.  If you lose control or sensation of any extremity, please go to the nearest Emergency Department immediately.

## 2023-07-25 NOTE — ED PROVIDER NOTES
Encounter Date: 7/25/2023       History     Chief Complaint   Patient presents with    Back Pain     Pt presents to ED this am CO spasm type pain to upper L spine that started apprx 3 hrs PTA. Denies fall, injury, trauma, lifting, or dysuria       42-year-old female with complaint of lower back pain and spasms since yesterday.  Patient reports that she was playing basketball a few days ago but denies any definite injury or excessive strain.  No loss of bowel or bladder function.  No fever or chills.  Patient reports pain comes goes and has spasm type pain in lower back that is reported as severe.      Review of patient's allergies indicates:   Allergen Reactions    House dust mite Tinitus     Past Medical History:   Diagnosis Date    Abnormal Pap smear     HPV/ biopsy-- never had a normal pap    Allergy     year around; tried OTC Zyrtec, Allegra, which makes her sleepy; increased water has helped partially    Closed displaced fracture of proximal phalanx of lesser toe of left foot 8/7/2017    COVID-19 virus infection 7/21/2020    Diabetes mellitus 11/2020    BS doesn't check 04/25/2022    Family history of breast cancer in female     Early breast cancer on mother's side of the family    History of 2019 novel coronavirus disease (COVID-19) 9/22/2020 July 2020 December 2020    History of HPV infection 01/01/2002    Initially had abnormal PAPs, except for 2015.    Left ovarian cyst     2 cm per CT abn & Pelvis w/o at [facility] on 12/7/19     Past Surgical History:   Procedure Laterality Date    APPENDECTOMY      BREAST RECONSTRUCTION      augmentation    TONSILLECTOMY       Family History   Problem Relation Age of Onset    Hypertension Mother     Alcohol abuse Mother         Recovered    Depression Mother     Drug abuse Mother         Recovered    Alcohol abuse Father     Drug abuse Father         Active    Breast cancer Maternal Grandmother     Hypertension Maternal Grandmother     Glaucoma Maternal Grandmother      Cataracts Maternal Grandmother     Vision loss Maternal Grandmother         Went blind at 25    Breast cancer Maternal Aunt     Breast cancer Maternal Aunt     Breast cancer Maternal Aunt     Breast cancer Maternal Aunt     Alcohol abuse Sister         Recovered    Drug abuse Sister         Recovered    Ovarian cancer Neg Hx     Stroke Neg Hx      Social History     Tobacco Use    Smoking status: Never    Smokeless tobacco: Never   Substance Use Topics    Alcohol use: Yes     Alcohol/week: 0.0 standard drinks     Comment: 2 drinks every 2 weeks    Drug use: Never     Review of Systems   Constitutional:  Negative for fever.   HENT:  Negative for sore throat.    Respiratory:  Negative for shortness of breath.    Cardiovascular:  Negative for chest pain.   Gastrointestinal:  Negative for nausea.   Genitourinary:  Negative for dysuria.   Musculoskeletal:  Positive for back pain.   Skin:  Negative for rash.   Neurological:  Negative for weakness.   Hematological:  Does not bruise/bleed easily.     Physical Exam     Initial Vitals [07/25/23 0624]   BP Pulse Resp Temp SpO2   113/64 74 16 97.5 °F (36.4 °C) 98 %      MAP       --         Physical Exam    Nursing note and vitals reviewed.  Constitutional: She appears well-developed and well-nourished.   HENT:   Head: Normocephalic and atraumatic.   Eyes: Conjunctivae and EOM are normal. Pupils are equal, round, and reactive to light.   Neck: Neck supple.   Normal range of motion.  Cardiovascular:  Normal rate, regular rhythm, normal heart sounds and intact distal pulses.           Pulmonary/Chest: Breath sounds normal.   Abdominal: Abdomen is soft. There is no abdominal tenderness. There is no rebound and no guarding.   Musculoskeletal:         General: Normal range of motion.      Cervical back: Normal range of motion and neck supple.      Comments: No spine tenderness, pain with ROM of lumbar spine     Neurological: She is alert and oriented to person, place, and time. She  has normal strength and normal reflexes.   Skin: Skin is warm and dry.   Psychiatric: She has a normal mood and affect. Her behavior is normal. Thought content normal.       ED Course   Procedures  Labs Reviewed   HIV 1 / 2 ANTIBODY   HEPATITIS C ANTIBODY   HEP C VIRUS HOLD SPECIMEN          Imaging Results    None          Medications - No data to display                           Clinical Impression:   Final diagnoses:  [S39.012A] Back strain, initial encounter (Primary)        ED Disposition Condition    Discharge Stable          ED Prescriptions       Medication Sig Dispense Start Date End Date Auth. Provider    metaxalone (SKELAXIN) 800 MG tablet Take 1 tablet (800 mg total) by mouth 3 (three) times daily as needed for Pain. 15 tablet 7/25/2023 -- Benigno Meléndez NP    ketorolac (TORADOL) 10 mg tablet Take 1 tablet (10 mg total) by mouth every 6 (six) hours as needed for Pain. 15 tablet 7/25/2023 -- Benigno Meléndez NP    predniSONE (DELTASONE) 50 MG Tab Take 1 tablet (50 mg total) by mouth once daily. for 3 days 3 tablet 7/25/2023 7/28/2023 Benigno Meléndez NP          Follow-up Information       Follow up With Specialties Details Why Contact Info    Radha Griffiths MD Family Medicine Schedule an appointment as soon as possible for a visit in 2 days  [address] [place] [address] [address] [place] [id number]  [phone]               Benigno Meléndez NP  07/25/23 9018

## 2023-07-28 ENCOUNTER — TELEPHONE (OUTPATIENT)
Dept: URGENT CARE | Facility: CLINIC | Age: 43
End: 2023-07-28
Payer: OTHER GOVERNMENT

## 2023-08-20 ENCOUNTER — PATIENT MESSAGE (OUTPATIENT)
Dept: ADMINISTRATIVE | Facility: HOSPITAL | Age: 43
End: 2023-08-20
Payer: OTHER GOVERNMENT

## 2023-08-21 ENCOUNTER — PATIENT OUTREACH (OUTPATIENT)
Dept: ADMINISTRATIVE | Facility: HOSPITAL | Age: 43
End: 2023-08-21
Payer: OTHER GOVERNMENT

## 2023-08-21 DIAGNOSIS — Z12.31 ENCOUNTER FOR SCREENING MAMMOGRAM FOR BREAST CANCER: ICD-10-CM

## 2023-08-21 DIAGNOSIS — Z12.11 ENCOUNTER FOR COLORECTAL CANCER SCREENING: Primary | ICD-10-CM

## 2023-08-21 DIAGNOSIS — Z12.12 ENCOUNTER FOR COLORECTAL CANCER SCREENING: Primary | ICD-10-CM

## 2023-08-21 NOTE — PROGRESS NOTES
Pt responded to portal questionnaire.  Mammo ordered.  LM for pt to call or go online to schedule mammo appt.

## 2023-08-24 ENCOUNTER — OFFICE VISIT (OUTPATIENT)
Dept: URGENT CARE | Facility: CLINIC | Age: 43
End: 2023-08-24
Payer: OTHER GOVERNMENT

## 2023-08-24 VITALS
BODY MASS INDEX: 31.55 KG/M2 | WEIGHT: 201 LBS | RESPIRATION RATE: 18 BRPM | OXYGEN SATURATION: 97 % | HEART RATE: 83 BPM | DIASTOLIC BLOOD PRESSURE: 80 MMHG | HEIGHT: 67 IN | TEMPERATURE: 98 F | SYSTOLIC BLOOD PRESSURE: 117 MMHG

## 2023-08-24 DIAGNOSIS — J02.9 SORE THROAT: ICD-10-CM

## 2023-08-24 DIAGNOSIS — J06.9 UPPER RESPIRATORY TRACT INFECTION, UNSPECIFIED TYPE: Primary | ICD-10-CM

## 2023-08-24 LAB
CTP QC/QA: YES
MOLECULAR STREP A: NEGATIVE
POC MOLECULAR INFLUENZA A AGN: NEGATIVE
POC MOLECULAR INFLUENZA B AGN: NEGATIVE
SARS-COV-2 RDRP RESP QL NAA+PROBE: NEGATIVE

## 2023-08-24 PROCEDURE — 87635 SARS-COV-2 COVID-19 AMP PRB: CPT | Mod: PBBFAC | Performed by: NURSE PRACTITIONER

## 2023-08-24 PROCEDURE — 87502 INFLUENZA DNA AMP PROBE: CPT | Mod: PBBFAC | Performed by: NURSE PRACTITIONER

## 2023-08-24 PROCEDURE — 87651 STREP A DNA AMP PROBE: CPT | Mod: PBBFAC | Performed by: NURSE PRACTITIONER

## 2023-08-24 PROCEDURE — 99213 OFFICE O/P EST LOW 20 MIN: CPT | Mod: S$PBB,,, | Performed by: NURSE PRACTITIONER

## 2023-08-24 PROCEDURE — 99213 PR OFFICE/OUTPT VISIT, EST, LEVL III, 20-29 MIN: ICD-10-PCS | Mod: S$PBB,,, | Performed by: NURSE PRACTITIONER

## 2023-08-24 PROCEDURE — 99214 OFFICE O/P EST MOD 30 MIN: CPT | Mod: PBBFAC | Performed by: NURSE PRACTITIONER

## 2023-08-24 RX ORDER — FLUTICASONE PROPIONATE 50 MCG
2 SPRAY, SUSPENSION (ML) NASAL DAILY
Qty: 16 G | Refills: 2 | Status: SHIPPED | OUTPATIENT
Start: 2023-08-24

## 2023-08-24 NOTE — LETTER
August 24, 2023      [facility]  [facility] [facility] [address]  Phone: [phone]       Patient: Brionna Carrion   YOB: 1980  Date of Visit: 08/24/2023    To Whom It May Concern:    Cindy Carrion  was at [facility] on 08/24/2023. The patient may return to work/school on 8/28/2023 with no restrictions. If you have any questions or concerns, or if I can be of further assistance, please do not hesitate to contact me.    Sincerely,    ADRIANA Santos

## 2023-08-25 NOTE — PATIENT INSTRUCTIONS
ER precautions given  Discussed symptomatic treatment and when to return for eval.  - OTC cold/flu products as desired for symptoms  - Plenty of fluids  - Home from work/school  - Tylenol or Motrin for pain/fever  - Flu/COVID/Strep tests NEGATIVE    Go to the ER if you experience chest pain with shortness of breath, shortness of breath when moving around your house, high fevers 103.0+, excessive vomiting/diarrhea, or general distress.

## 2023-08-25 NOTE — PROGRESS NOTES
"Subjective:      Patient ID: Brionna Carrion is a 42 y.o. female.    Vitals:  height is 5' 7" (1.702 m) and weight is 91.2 kg (201 lb). Her oral temperature is 98.4 °F (36.9 °C). Her blood pressure is 117/80 and her pulse is 83. Her respiration is 18 and oxygen saturation is 97%.     Chief Complaint: Cough, Nasal Congestion, Generalized Body Aches, Headache, Fever, flu exposure  , and covid exposure    HPI as stated in CC. Pt reports taking tylenol with minimal relief. Symptoms began 2 days ago. Denies fever, shortness of breath and chest pain.     Constitution: Positive for fever.   Respiratory:  Positive for cough.    Neurological:  Positive for headaches.      Objective:     Physical Exam   Constitutional: She is oriented to person, place, and time.  Non-toxic appearance. She does not appear ill. No distress.   HENT:   Head: Normocephalic.   Ears:   Right Ear: Tympanic membrane normal.   Left Ear: Tympanic membrane normal.   Nose: Rhinorrhea and congestion present.   Mouth/Throat: Posterior oropharyngeal erythema present. No oropharyngeal exudate.   Eyes: Conjunctivae are normal.   Cardiovascular: Normal rate and normal pulses.   Pulmonary/Chest: Effort normal and breath sounds normal. No respiratory distress.   Abdominal: Normal appearance and bowel sounds are normal.   Musculoskeletal: Normal range of motion.         General: Normal range of motion.   Lymphadenopathy:     She has no cervical adenopathy.   Neurological: no focal deficit. She is alert and oriented to person, place, and time.   Skin: Skin is warm, dry and not diaphoretic. Capillary refill takes less than 2 seconds.   Psychiatric: Her behavior is normal. Mood, judgment and thought content normal.   Nursing note and vitals reviewed.      Assessment:     1. Symptoms of upper respiratory infection (URI)    2. Sore throat      Results for orders placed or performed in visit on 08/24/23   POCT COVID-19 Rapid Screening   Result Value Ref Range    POC Rapid " COVID Negative Negative     Acceptable Yes    POCT Influenza A/B Molecular   Result Value Ref Range    POC Molecular Influenza A Ag Negative Negative, Not Reported    POC Molecular Influenza B Ag Negative Negative, Not Reported     Acceptable Yes    POCT Strep A, Molecular   Result Value Ref Range    Molecular Strep A, POC Negative Negative     Acceptable Yes          Plan:   ER precautions given  Discussed symptomatic treatment and when to return for eval.  - OTC cold/flu products as desired for symptoms  - Plenty of fluids  - Home from work/school  - Tylenol or Motrin for pain/fever  - Flu/COVID/Strep tests NEGATIVE    Go to the ER if you experience chest pain with shortness of breath, shortness of breath when moving around your house, high fevers 103.0+, excessive vomiting/diarrhea, or general distress.     Symptoms of upper respiratory infection (URI)  -     POCT COVID-19 Rapid Screening  -     POCT Influenza A/B Molecular    Sore throat  -     POCT Strep A, Molecular    Other orders  -     fluticasone propionate (FLONASE) 50 mcg/actuation nasal spray; 2 sprays (100 mcg total) by Each Nostril route once daily.  Dispense: 16 g; Refill: 2

## 2023-08-26 ENCOUNTER — HOSPITAL ENCOUNTER (EMERGENCY)
Facility: HOSPITAL | Age: 43
Discharge: HOME | End: 2023-08-26
Attending: EMERGENCY MEDICINE
Payer: OTHER GOVERNMENT

## 2023-08-26 VITALS
HEIGHT: 67 IN | BODY MASS INDEX: 30.52 KG/M2 | OXYGEN SATURATION: 98 % | TEMPERATURE: 98 F | WEIGHT: 194.44 LBS | HEART RATE: 80 BPM | SYSTOLIC BLOOD PRESSURE: 118 MMHG | RESPIRATION RATE: 18 BRPM | DIASTOLIC BLOOD PRESSURE: 84 MMHG

## 2023-08-26 DIAGNOSIS — R51.9 NONINTRACTABLE HEADACHE, UNSPECIFIED CHRONICITY PATTERN, UNSPECIFIED HEADACHE TYPE: Primary | ICD-10-CM

## 2023-08-26 PROCEDURE — 96361 HYDRATE IV INFUSION ADD-ON: CPT

## 2023-08-26 PROCEDURE — 99284 EMERGENCY DEPT VISIT MOD MDM: CPT | Mod: 25

## 2023-08-26 PROCEDURE — 25000003 PHARM REV CODE 250: Performed by: NURSE PRACTITIONER

## 2023-08-26 PROCEDURE — 96375 TX/PRO/DX INJ NEW DRUG ADDON: CPT

## 2023-08-26 PROCEDURE — 96374 THER/PROPH/DIAG INJ IV PUSH: CPT

## 2023-08-26 PROCEDURE — 63600175 PHARM REV CODE 636 W HCPCS: Performed by: NURSE PRACTITIONER

## 2023-08-26 RX ORDER — METOCLOPRAMIDE 10 MG/1
10 TABLET ORAL EVERY 6 HOURS PRN
Qty: 30 TABLET | Refills: 0 | Status: SHIPPED | OUTPATIENT
Start: 2023-08-26 | End: 2023-11-30

## 2023-08-26 RX ORDER — DIPHENHYDRAMINE HYDROCHLORIDE 50 MG/ML
12.5 INJECTION INTRAMUSCULAR; INTRAVENOUS
Status: COMPLETED | OUTPATIENT
Start: 2023-08-26 | End: 2023-08-26

## 2023-08-26 RX ORDER — KETOROLAC TROMETHAMINE 30 MG/ML
15 INJECTION, SOLUTION INTRAMUSCULAR; INTRAVENOUS
Status: COMPLETED | OUTPATIENT
Start: 2023-08-26 | End: 2023-08-26

## 2023-08-26 RX ORDER — ONDANSETRON 2 MG/ML
8 INJECTION INTRAMUSCULAR; INTRAVENOUS
Status: DISCONTINUED | OUTPATIENT
Start: 2023-08-26 | End: 2023-08-26 | Stop reason: HOSPADM

## 2023-08-26 RX ORDER — METOCLOPRAMIDE HYDROCHLORIDE 5 MG/ML
10 INJECTION INTRAMUSCULAR; INTRAVENOUS
Status: COMPLETED | OUTPATIENT
Start: 2023-08-26 | End: 2023-08-26

## 2023-08-26 RX ORDER — KETOROLAC TROMETHAMINE 10 MG/1
10 TABLET, FILM COATED ORAL EVERY 6 HOURS PRN
Qty: 15 TABLET | Refills: 0 | OUTPATIENT
Start: 2023-08-26 | End: 2023-11-25

## 2023-08-26 RX ORDER — SODIUM CHLORIDE 9 MG/ML
1000 INJECTION, SOLUTION INTRAVENOUS
Status: COMPLETED | OUTPATIENT
Start: 2023-08-26 | End: 2023-08-26

## 2023-08-26 RX ADMIN — KETOROLAC TROMETHAMINE 15 MG: 30 INJECTION, SOLUTION INTRAMUSCULAR; INTRAVENOUS at 10:08

## 2023-08-26 RX ADMIN — METOCLOPRAMIDE 10 MG: 5 INJECTION, SOLUTION INTRAMUSCULAR; INTRAVENOUS at 10:08

## 2023-08-26 RX ADMIN — DIPHENHYDRAMINE HYDROCHLORIDE 12.5 MG: 50 INJECTION, SOLUTION INTRAMUSCULAR; INTRAVENOUS at 10:08

## 2023-08-26 RX ADMIN — SODIUM CHLORIDE 1000 ML: 9 INJECTION, SOLUTION INTRAVENOUS at 10:08

## 2023-08-26 NOTE — ED PROVIDER NOTES
Encounter Date: 8/26/2023       History     Chief Complaint   Patient presents with    Headache     Pt c/o migraine x 2 days, has been to urgent care, but migraine is still there.      42-year-old female with complaint left-sided headache for the past 2 days.  Reports nausea and vomiting yesterday.  No fever or chills.  No other complaints.        Review of patient's allergies indicates:   Allergen Reactions    House dust mite Tinitus     Past Medical History:   Diagnosis Date    Abnormal Pap smear     HPV/ biopsy-- never had a normal pap    Allergy     year around; tried OTC Zyrtec, Allegra, which makes her sleepy; increased water has helped partially    Closed displaced fracture of proximal phalanx of lesser toe of left foot 8/7/2017    COVID-19 virus infection 7/21/2020    Diabetes mellitus 11/2020    BS doesn't check 04/25/2022    Family history of breast cancer in female     Early breast cancer on mother's side of the family    History of 2019 novel coronavirus disease (COVID-19) 9/22/2020 July 2020 December 2020    History of HPV infection 01/01/2002    Initially had abnormal PAPs, except for 2015.    Left ovarian cyst     2 cm per CT abn & Pelvis w/o at [facility] on 12/7/19     Past Surgical History:   Procedure Laterality Date    APPENDECTOMY      BREAST RECONSTRUCTION      augmentation    TONSILLECTOMY       Family History   Problem Relation Age of Onset    Hypertension Mother     Alcohol abuse Mother         Recovered    Depression Mother     Drug abuse Mother         Recovered    Alcohol abuse Father     Drug abuse Father         Active    Breast cancer Maternal Grandmother     Hypertension Maternal Grandmother     Glaucoma Maternal Grandmother     Cataracts Maternal Grandmother     Vision loss Maternal Grandmother         Went blind at 25    Breast cancer Maternal Aunt     Breast cancer Maternal Aunt     Breast cancer Maternal Aunt     Breast cancer Maternal Aunt     Alcohol abuse Sister         Recovered     Drug abuse Sister         Recovered    Ovarian cancer Neg Hx     Stroke Neg Hx      Social History     Tobacco Use    Smoking status: Never    Smokeless tobacco: Never   Substance Use Topics    Alcohol use: Yes     Alcohol/week: 0.0 standard drinks of alcohol     Comment: 2 drinks every 2 weeks    Drug use: Never     Review of Systems   Constitutional:  Negative for fever.   HENT:  Negative for sore throat.    Respiratory:  Negative for shortness of breath.    Cardiovascular:  Negative for chest pain.   Gastrointestinal:  Negative for nausea.   Genitourinary:  Negative for dysuria.   Musculoskeletal:  Negative for back pain.   Skin:  Negative for rash.   Neurological:  Positive for headaches. Negative for weakness.   Hematological:  Does not bruise/bleed easily.       Physical Exam     Initial Vitals [08/26/23 0953]   BP Pulse Resp Temp SpO2   117/87 84 16 98.2 °F (36.8 °C) 98 %      MAP       --         Physical Exam    Nursing note and vitals reviewed.  Constitutional: She appears well-developed and well-nourished.   HENT:   Head: Normocephalic and atraumatic.   Eyes: Conjunctivae and EOM are normal. Pupils are equal, round, and reactive to light.   Neck: Neck supple.   Normal range of motion.  Cardiovascular:  Normal rate, regular rhythm, normal heart sounds and intact distal pulses.           Pulmonary/Chest: Breath sounds normal.   Abdominal: Abdomen is soft. There is no abdominal tenderness. There is no rebound and no guarding.   Musculoskeletal:         General: Normal range of motion.      Cervical back: Normal range of motion and neck supple.     Neurological: She is alert and oriented to person, place, and time. She has normal strength and normal reflexes.   Skin: Skin is warm and dry.   Psychiatric: She has a normal mood and affect. Her behavior is normal. Thought content normal.         ED Course   Procedures  Labs Reviewed - No data to display       Imaging Results    None          Medications    ondansetron injection 8 mg (has no administration in time range)   0.9%  NaCl infusion (1,000 mLs Intravenous New Bag 8/26/23 1051)   metoclopramide HCl injection 10 mg (10 mg Intravenous Given by Other 8/26/23 1045)   diphenhydrAMINE injection 12.5 mg (12.5 mg Intravenous Given by Other 8/26/23 1046)   ketorolac injection 15 mg (15 mg Intravenous Given by Other 8/26/23 1042)     Medical Decision Making  Risk  Prescription drug management.    11:35 AM  Patient reports no headache at present.  Patient requesting discharge.                           Clinical Impression:   Final diagnoses:  [R51.9] Nonintractable headache, unspecified chronicity pattern, unspecified headache type (Primary)        ED Disposition Condition    Discharge Stable          ED Prescriptions       Medication Sig Dispense Start Date End Date Auth. Provider    metoclopramide HCl (REGLAN) 10 MG tablet Take 1 tablet (10 mg total) by mouth every 6 (six) hours as needed (headache). 30 tablet 8/26/2023 -- Benigno Meléndez NP    ketorolac (TORADOL) 10 mg tablet Take 1 tablet (10 mg total) by mouth every 6 (six) hours as needed for Pain. 15 tablet 8/26/2023 -- Benigno Meléndez NP          Follow-up Information       Follow up With Specialties Details Why Contact Info    Radha Griffiths MD Family Medicine Schedule an appointment as soon as possible for a visit  As needed [id number] [facility] [address] [place] [address]  [phone]               Benigno Meléndez NP  08/26/23 3850

## 2023-08-29 ENCOUNTER — PATIENT MESSAGE (OUTPATIENT)
Dept: OTOLARYNGOLOGY | Facility: CLINIC | Age: 43
End: 2023-08-29

## 2023-08-29 ENCOUNTER — TELEPHONE (OUTPATIENT)
Dept: OTOLARYNGOLOGY | Facility: CLINIC | Age: 43
End: 2023-08-29
Payer: OTHER GOVERNMENT

## 2023-09-13 NOTE — PROGRESS NOTES
Attempted to contact pt to schedule mammo and annual exam.  No answer, voice mail box full, unable to leave message.

## 2023-09-21 ENCOUNTER — OFFICE VISIT (OUTPATIENT)
Dept: PODIATRY | Facility: CLINIC | Age: 43
End: 2023-09-21
Payer: OTHER GOVERNMENT

## 2023-09-21 ENCOUNTER — HOSPITAL ENCOUNTER (OUTPATIENT)
Dept: RADIOLOGY | Facility: HOSPITAL | Age: 43
Discharge: HOME | End: 2023-09-21
Attending: PODIATRIST
Payer: OTHER GOVERNMENT

## 2023-09-21 DIAGNOSIS — M76.72 PERONEAL TENDINITIS, LEFT: ICD-10-CM

## 2023-09-21 DIAGNOSIS — M25.571 PAIN IN RIGHT ANKLE AND JOINTS OF RIGHT FOOT: ICD-10-CM
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
## 2023-09-21 DIAGNOSIS — M25.572 PAIN IN LEFT ANKLE AND JOINTS OF LEFT FOOT: ICD-10-CM

## 2023-09-21 DIAGNOSIS — S96.912S REPETITIVE STRAIN INJURY OF LEFT FOOT, SEQUELA: ICD-10-CM
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
## 2023-09-21 DIAGNOSIS — M76.821 POSTERIOR TIBIAL TENDINITIS, RIGHT: Primary | ICD-10-CM

## 2023-09-21 DIAGNOSIS — S96.912S OVERUSE SYNDROME OF FOOT, LEFT, SEQUELA: ICD-10-CM

## 2023-09-21 DIAGNOSIS — M72.2 PLANTAR FASCIITIS: ICD-10-CM

## 2023-09-21 DIAGNOSIS — M76.821 POSTERIOR TIBIAL TENDINITIS, RIGHT: ICD-10-CM

## 2023-09-21 PROCEDURE — 99999 PR PBB SHADOW E&M-EST. PATIENT-LVL III: CPT | Mod: PBBFAC,,, | Performed by: PODIATRIST

## 2023-09-21 PROCEDURE — 73630 X-RAY EXAM OF FOOT: CPT | Mod: 26,50,, | Performed by: RADIOLOGY

## 2023-09-21 PROCEDURE — 99999 PR PBB SHADOW E&M-EST. PATIENT-LVL III: ICD-10-PCS | Mod: PBBFAC,,, | Performed by: PODIATRIST

## 2023-09-21 PROCEDURE — 99213 OFFICE O/P EST LOW 20 MIN: CPT | Mod: PBBFAC | Performed by: PODIATRIST

## 2023-09-21 PROCEDURE — 73630 X-RAY EXAM OF FOOT: CPT | Mod: TC,50

## 2023-09-21 PROCEDURE — 99214 PR OFFICE/OUTPT VISIT, EST, LEVL IV, 30-39 MIN: ICD-10-PCS | Mod: S$PBB,,, | Performed by: PODIATRIST

## 2023-09-21 PROCEDURE — 99214 OFFICE O/P EST MOD 30 MIN: CPT | Mod: S$PBB,,, | Performed by: PODIATRIST

## 2023-09-21 PROCEDURE — 73630 XR FOOT COMPLETE 3 VIEW BILATERAL: ICD-10-PCS | Mod: 26,50,, | Performed by: RADIOLOGY

## 2023-09-21 RX ORDER — DICLOFENAC SODIUM 75 MG/1
75 TABLET, DELAYED RELEASE ORAL 2 TIMES DAILY
Qty: 60 TABLET | Refills: 1 | Status: SHIPPED | OUTPATIENT
Start: 2023-09-21 | End: 2023-10-21

## 2023-09-21 NOTE — PROGRESS NOTES
Subjective:       Patient ID: Brionna Carrion is a 42 y.o. female.    Chief Complaint: Plantar Fasciitis (Plantar fascitis, rates pain 7, nondiabetic, pt wears tennis shoes)      HPI: Brionna Carrion presents to the clinic today for evaluation concerning stated moderate pains to the left and right foot/ankle at the medial and lateral aspect. Patient states pains are approx. 7/10. Pains are described as sharp and achy. Pains have been present for duration of several weeks. Patient states the pains are exacerbated with walking and standing and prolonged activities. States no prior medical evaluation by a MD/DO/DPM/NP. States seldom NSAIDs. Trauma is not stated.  Patient's Primary Care Provider is Radha Griffiths MD. Is in the  and typically runs 4 miles daily, 4-5 days a weeks. Over the course of the past 2 months, due to pains and debility, she is only able to run 1.5-2.0 max, once or twice a week. States a recent ED visit and was Rx PO Toradol (for a separate issue), made her foot pains completely resolve.     Review of patient's allergies indicates:   Allergen Reactions    House dust mite Tinitus       Past Medical History:   Diagnosis Date    Abnormal Pap smear     HPV/ biopsy-- never had a normal pap    Allergy     year around; tried OTC Zyrtec, Allegra, which makes her sleepy; increased water has helped partially    Closed displaced fracture of proximal phalanx of lesser toe of left foot 8/7/2017    COVID-19 virus infection 7/21/2020    Diabetes mellitus 11/2020    BS doesn't check 04/25/2022    Family history of breast cancer in female     Early breast cancer on mother's side of the family    History of 2019 novel coronavirus disease (COVID-19) 9/22/2020 July 2020 December 2020    History of HPV infection 01/01/2002    Initially had abnormal PAPs, except for 2015.    Left ovarian cyst     2 cm per CT abn & Pelvis w/o at [facility] on 12/7/19       Family History   Problem Relation Age of Onset    Hypertension  "Mother     Alcohol abuse Mother         Recovered    Depression Mother     Drug abuse Mother         Recovered    Alcohol abuse Father     Drug abuse Father         Active    Breast cancer Maternal Grandmother     Hypertension Maternal Grandmother     Glaucoma Maternal Grandmother     Cataracts Maternal Grandmother     Vision loss Maternal Grandmother         Went blind at 25    Breast cancer Maternal Aunt     Breast cancer Maternal Aunt     Breast cancer Maternal Aunt     Breast cancer Maternal Aunt     Alcohol abuse Sister         Recovered    Drug abuse Sister         Recovered    Ovarian cancer Neg Hx     Stroke Neg Hx        Social History     Socioeconomic History    Marital status:    Occupational History    Occupation: [employer]      Comment:  [employer]   Tobacco Use    Smoking status: Never    Smokeless tobacco: Never   Substance and Sexual Activity    Alcohol use: Yes     Alcohol/week: 0.0 standard drinks of alcohol     Comment: 2 drinks every 2 weeks    Drug use: Never    Sexual activity: Yes     Partners: Male   Social History Narrative    Breakfast: Protein shake (grass fed & powder CLA), water, +5 hour energy drink throughout the day    Lunch: Meat: shrimp & 1/2 sweet potato; water     Dinner: Salad with black olives, olive oil, little salt; water    Snacks: None (no sodas or diet sodas)    Eats out: 1x/mo: [place]: grilled shrimp, sweet potato, part of a salad; water    PA: 45 min resistance 3x/wk, runs 5 mi 3-4 x/wk, yoga in the sauna "[employer]"    Water: 2 - 24 oz/d + 8 = 56 oz/d       Past Surgical History:   Procedure Laterality Date    APPENDECTOMY      BREAST RECONSTRUCTION      augmentation    TONSILLECTOMY         Review of Systems   Constitutional:  Negative for chills, fatigue and fever.   HENT:  Negative for hearing loss.    Eyes:  Negative for photophobia and visual disturbance.   Respiratory:  Negative for cough, chest tightness, shortness of breath and wheezing.  "   Cardiovascular:  Negative for chest pain and palpitations.   Gastrointestinal:  Negative for constipation, diarrhea, nausea and vomiting.   Endocrine: Negative for cold intolerance and heat intolerance.   Genitourinary:  Negative for flank pain.   Musculoskeletal:  Positive for gait problem. Negative for neck pain and neck stiffness.   Neurological:  Negative for light-headedness and headaches.   Psychiatric/Behavioral:  Negative for sleep disturbance.          Objective:   There were no vitals taken for this visit.    Physical Exam  LOWER EXTREMITY PHYSICAL EXAMINATION    DERMATOLOGY: Skin is supple, dry and intact.     ORTHOPEDIC (RLE): PT tendon pains with weakness with isolation. Rectus foot type is noted.    ORTHOPEDIC (LLE): PB and PL tenderness is noted. Weakness with isolation. Mild CC joint pains are noted. No STJ pains are noted. Rectus foot type is noted.     Assessment:     1. Posterior tibial tendinitis, right    2. Peroneal tendinitis, left    3. Pain in left ankle and joints of left foot    4. Pain in right ankle and joints of right foot    5. Repetitive strain injury of left foot, sequela    6. Overuse syndrome of foot, left, sequela    7. Plantar fasciitis          Plan:     Posterior tibial tendinitis, right  -     X-Ray Foot Complete Bilateral; Future; Expected date: 09/21/2023    Peroneal tendinitis, left  -     X-Ray Foot Complete Bilateral; Future; Expected date: 09/21/2023    Pain in left ankle and joints of left foot  -     X-Ray Foot Complete Bilateral; Future; Expected date: 09/21/2023  -     diclofenac (VOLTAREN) 75 MG EC tablet; Take 1 tablet (75 mg total) by mouth 2 (two) times daily.  Dispense: 60 tablet; Refill: 1    Pain in right ankle and joints of right foot  -     X-Ray Foot Complete Bilateral; Future; Expected date: 09/21/2023  -     diclofenac (VOLTAREN) 75 MG EC tablet; Take 1 tablet (75 mg total) by mouth 2 (two) times daily.  Dispense: 60 tablet; Refill: 1    Repetitive  strain injury of left foot, sequela    Overuse syndrome of foot, left, sequela    Plantar fasciitis      Thorough discussion is had with the patient today, concerning the diagnosis, its etiology, and the treatment algorithm at present.     Please obtain XR of B/L Foot.    Cessation from physical activities for at least 3 weeks.    Did discuss proper and supportive shoe gear in detail and at length with the patient.  These are shoes with firm and robust arch support; medial counter.  Shoes which only bend at the metatarsophalangeal joint and which are rigid in the midfoot and hindfoot. Patient urged to purchase running type or cross training type shoes gear which are designed for pronation control.             Future Appointments   Date Time Provider Department Center   10/16/2023 10:45 AM Bolivar Sterling MD [facility]

## 2023-09-27 DIAGNOSIS — M54.2 NECK PAIN: Primary | ICD-10-CM

## 2023-11-30 ENCOUNTER — OFFICE VISIT (OUTPATIENT)
Dept: OBSTETRICS AND GYNECOLOGY | Facility: CLINIC | Age: 43
End: 2023-11-30
Payer: OTHER GOVERNMENT

## 2023-11-30 VITALS
HEIGHT: 67 IN | WEIGHT: 183.44 LBS | BODY MASS INDEX: 28.79 KG/M2 | SYSTOLIC BLOOD PRESSURE: 108 MMHG | DIASTOLIC BLOOD PRESSURE: 60 MMHG

## 2023-11-30 DIAGNOSIS — Z31.69 ENCOUNTER FOR PRECONCEPTION CONSULTATION: ICD-10-CM

## 2023-11-30 DIAGNOSIS — Z01.419 ROUTINE GYNECOLOGICAL EXAMINATION: Primary | ICD-10-CM

## 2023-11-30 PROCEDURE — 99999 PR PBB SHADOW E&M-EST. PATIENT-LVL III: ICD-10-PCS | Mod: PBBFAC,,, | Performed by: NURSE PRACTITIONER

## 2023-11-30 PROCEDURE — 99213 OFFICE O/P EST LOW 20 MIN: CPT | Mod: PBBFAC | Performed by: NURSE PRACTITIONER

## 2023-11-30 PROCEDURE — 99999 PR PBB SHADOW E&M-EST. PATIENT-LVL III: CPT | Mod: PBBFAC,,, | Performed by: NURSE PRACTITIONER

## 2023-11-30 PROCEDURE — 99396 PREV VISIT EST AGE 40-64: CPT | Mod: S$PBB,,, | Performed by: NURSE PRACTITIONER

## 2023-11-30 PROCEDURE — 99396 PR PREVENTIVE VISIT,EST,40-64: ICD-10-PCS | Mod: S$PBB,,, | Performed by: NURSE PRACTITIONER

## 2023-11-30 NOTE — PROGRESS NOTES
"  Subjective:       Patient ID: Brionna Carrion is a 42 y.o. female.    Chief Complaint:  Well Woman      History of Present Illness  HPI  Desires preconception counseling to ensure she is ok to have children at her age  Mammogram ordered by Dr. Griffiths     Health Maintenance   Topic Date Due    TETANUS VACCINE  Never done    Mammogram  Never done    Hepatitis C Screening  Completed    Lipid Panel  Completed     GYN & OB History  Patient's last menstrual period was 2023.   Date of Last Pap: 2021    OB History    Para Term  AB Living   3 2 2   1 2   SAB IAB Ectopic Multiple Live Births     1     2      # Outcome Date GA Lbr Greg/2nd Weight Sex Delivery Anes PTL Lv   3 IAB            2 Term      Vag-Spont   [place]   1 Term      Vag-Spont   [place]       Review of Systems  Review of Systems        Objective:   /60   Ht 5' 7" (1.702 m)   Wt 83.2 kg (183 lb 6.8 oz)   LMP 2023   BMI 28.73 kg/m²    Physical Exam:   Constitutional: She is oriented to person, place, and time. She appears well-developed and well-nourished.                       Musculoskeletal: Moves all extremeties.       Neurological: She is alert and oriented to person, place, and time.    Skin: Skin is warm and dry.    Psychiatric: She has a normal mood and affect. Her behavior is normal. Judgment and thought content normal.        Assessment:        1. Routine gynecological examination    2. Encounter for preconception consultation                Plan:            Brionna was seen today for well woman.    Diagnoses and all orders for this visit:    Routine gynecological examination    Encounter for preconception consultation    Refer to GYN MD for preconception consultation visit   Return to clinic after using calendar method for 6 months for infertility management if needed    "

## 2024-01-29 ENCOUNTER — PATIENT MESSAGE (OUTPATIENT)
Dept: OTOLARYNGOLOGY | Facility: CLINIC | Age: 44
End: 2024-01-29
Payer: OTHER GOVERNMENT

## 2024-02-13 ENCOUNTER — TELEPHONE (OUTPATIENT)
Dept: OBSTETRICS AND GYNECOLOGY | Facility: CLINIC | Age: 44
End: 2024-02-13
Payer: OTHER GOVERNMENT

## 2024-03-26 ENCOUNTER — TELEPHONE (OUTPATIENT)
Dept: PAIN MEDICINE | Facility: CLINIC | Age: 44
End: 2024-03-26
Payer: OTHER GOVERNMENT

## 2024-03-26 NOTE — TELEPHONE ENCOUNTER
Reached out to patient to reschedule appointment because the provider will be out of clinic. No answer. Left message on patients voice mail to call back at earliest convenience to schedule robert Reynolds  Medical

## 2024-03-27 ENCOUNTER — TELEPHONE (OUTPATIENT)
Dept: PAIN MEDICINE | Facility: CLINIC | Age: 44
End: 2024-03-27
Payer: OTHER GOVERNMENT

## 2024-03-28 ENCOUNTER — TELEPHONE (OUTPATIENT)
Dept: PAIN MEDICINE | Facility: CLINIC | Age: 44
End: 2024-03-28
Payer: OTHER GOVERNMENT

## 2024-03-28 NOTE — TELEPHONE ENCOUNTER
Reached out to patient to reschedule appointment because the provider will be out of clinic. No answer. No v.m set up.      Santiago Reynolds  Medical Assistant

## 2024-04-01 ENCOUNTER — TELEPHONE (OUTPATIENT)
Dept: PAIN MEDICINE | Facility: CLINIC | Age: 44
End: 2024-04-01
Payer: OTHER GOVERNMENT

## 2024-04-01 NOTE — TELEPHONE ENCOUNTER
Reached out to patient to reschedule appointment because the provider will be out of clinic. No answer. No v.m set up    Santiago Cotton  Medical Assistant

## 2024-04-03 ENCOUNTER — PATIENT MESSAGE (OUTPATIENT)
Dept: PULMONOLOGY | Facility: CLINIC | Age: 44
End: 2024-04-03
Payer: OTHER GOVERNMENT

## 2024-04-11 ENCOUNTER — OFFICE VISIT (OUTPATIENT)
Dept: OPHTHALMOLOGY | Facility: CLINIC | Age: 44
End: 2024-04-11
Payer: OTHER GOVERNMENT

## 2024-04-11 ENCOUNTER — TELEPHONE (OUTPATIENT)
Dept: PAIN MEDICINE | Facility: CLINIC | Age: 44
End: 2024-04-11
Payer: OTHER GOVERNMENT

## 2024-04-11 DIAGNOSIS — M35.01 KERATITIS SICCA, BOTH EYES: Primary | ICD-10-CM

## 2024-04-11 DIAGNOSIS — H52.7 REFRACTIVE ERRORS: ICD-10-CM

## 2024-04-11 PROCEDURE — 99212 OFFICE O/P EST SF 10 MIN: CPT | Mod: PBBFAC | Performed by: OPTOMETRIST

## 2024-04-11 PROCEDURE — 92015 DETERMINE REFRACTIVE STATE: CPT | Mod: ,,, | Performed by: OPTOMETRIST

## 2024-04-11 PROCEDURE — 92014 COMPRE OPH EXAM EST PT 1/>: CPT | Mod: S$PBB,,, | Performed by: OPTOMETRIST

## 2024-04-11 PROCEDURE — 99999 PR PBB SHADOW E&M-EST. PATIENT-LVL II: CPT | Mod: PBBFAC,,, | Performed by: OPTOMETRIST

## 2024-04-11 NOTE — PROGRESS NOTES
SUBJECTIVE  Brionna Carrion is 43 y.o. female  Uncorrected distance visual acuity was 20/20 in the right eye and 20/40 in the left eye. Uncorrected near visual acuity was J4 in the right eye and J5 in the left eye.   Chief Complaint   Patient presents with    Concerns About Ocular Health     Pt has recent dx of eczema around her left eye. Pt has noticed vision changes.            HPI     Concerns About Ocular Health     Additional comments: Pt has recent dx of eczema around her left eye. Pt   has noticed vision changes.             Comments    Patient notice a recent change with left eye vision in the past month, and   seeing spots/floaters with both eyes but no flashes of light.  No ocular pain/discomfort and not using any otc drops.  Wear otc readers but does not know the power and did not bring them today.             Last edited by Anuel Shankar, OD on 4/11/2024  9:28 AM.         Assessment /Plan :  1. Keratitis sicca, both eyes   Worksheet given. Discussed Dry Eyes in detail including Artificial Tears, lubricants, and Omega 3 Fish Oils.     2. Refractive errors   No pathology.      RTC if no improvement or worsens. Follow up with Derm as scheduled

## 2024-04-18 ENCOUNTER — TELEPHONE (OUTPATIENT)
Dept: PAIN MEDICINE | Facility: CLINIC | Age: 44
End: 2024-04-18
Payer: OTHER GOVERNMENT

## 2024-04-25 ENCOUNTER — OFFICE VISIT (OUTPATIENT)
Dept: PSYCHIATRY | Facility: CLINIC | Age: 44
End: 2024-04-25
Payer: OTHER GOVERNMENT

## 2024-04-25 DIAGNOSIS — F51.05 INSOMNIA DUE TO MENTAL DISORDER: ICD-10-CM

## 2024-04-25 DIAGNOSIS — F43.9 STRESS: ICD-10-CM

## 2024-04-25 DIAGNOSIS — F31.60 BIPOLAR AFFECTIVE DISORDER, CURRENT EPISODE MIXED, WITHOUT PSYCHOTIC FEATURES: Primary | ICD-10-CM

## 2024-04-25 DIAGNOSIS — F90.2 ATTENTION DEFICIT HYPERACTIVITY DISORDER (ADHD), COMBINED TYPE: ICD-10-CM

## 2024-04-25 DIAGNOSIS — F41.1 GENERALIZED ANXIETY DISORDER: ICD-10-CM

## 2024-04-25 PROCEDURE — 99215 OFFICE O/P EST HI 40 MIN: CPT | Mod: 95,,, | Performed by: PSYCHOLOGIST

## 2024-04-25 RX ORDER — FLUOXETINE 10 MG/1
10 CAPSULE ORAL DAILY
Qty: 30 CAPSULE | Refills: 2 | Status: SHIPPED | OUTPATIENT
Start: 2024-04-25 | End: 2024-07-24

## 2024-04-25 RX ORDER — ARIPIPRAZOLE 2 MG/1
2 TABLET ORAL DAILY
Qty: 30 TABLET | Refills: 2 | Status: SHIPPED | OUTPATIENT
Start: 2024-04-25 | End: 2024-07-24

## 2024-04-25 NOTE — PROGRESS NOTES
"MEDICATION MANAGEMENT SESSION: VIRTUAL    Name: Brionna Carrion  Age: 43 y.o.  : 1980    Preferred Name: Brionna    Referring provider: Anupam Paul LCSW    Reason for Visit:  Medication follow up    Summary of Visit:  Pt last seen for initial visit on 22. She reports she never followed up because she wasn't ready to accept that she had mental health problems and didn't want to be on medication. She is now at a point where symptoms are so problematic, she's willing to accept diagnosis and medication.  Pt was prescribed Lamictal 25 mg qd (with instructions for slow titration schedule) and Trazodone 50 mg qhs. She reports she didn't like how she felt on Lamictal so didn't take it for long and discontinued Trazodone at about the same time. The only other mood stabilizer she has taken in the past is Lithium as a teenager when her parents thought she was manic.     She reports current labile mood ("up and down") and some thoughts of high-risk activities but no impulsive behavior that come in "waves" of 2-3 days. She complains of high daily levels of anxiety, constant worry, and feeling on edge. She has not experienced panic attacks in awhile though she used to. Discussed with pt Abilify 2 mg qd and Prozac 10 mg qd and reviewed associated benefits and risks of both medications.    Current Symptoms:   ADHD: fidgety, leaves seat, on the go/driven, overtalkative, no follow-through, avoids effortful tasks, easily distracted   Depressive Disorder: tired/fatigued, worthlessness, hopelessness, tearfulness/crying   Anxiety Disorder: anxiety/nervousness, hyperarousal symptoms, fatigue, irritability, excessive worry   Panic Disorder: denied   Manic Disorder: expansive mood, irritable mood, increased distractability, hyperverbal, racing thoughts, reckless/risk-taking behavior   Psychotic Disorder: denied   Substance Use:  Alcohol: stopped drinking couple weeks ago because was getting to be pattern (bottle of wine each night) " "        7/29/2020     6:47 PM   GAD7   1. Feeling nervous, anxious, or on edge?    2. Not being able to stop or control worrying?    3. Worrying too much about different things?    4. Trouble relaxing?    5. Being so restless that it is hard to sit still?    6. Becoming easily annoyed or irritable?    7. Feeling afraid as if something awful might happen?    LAURA-7 Score        Information is confidential and restricted. Go to Review Flowsheets to unlock data.     Review of Systems   Constitutional:  Positive for fatigue. Negative for activity change, appetite change and unexpected weight change.   Respiratory:  Negative for shortness of breath.    Psychiatric/Behavioral:  Positive for decreased concentration and dysphoric mood. Negative for agitation, behavioral problems, confusion, hallucinations, self-injury, sleep disturbance and suicidal ideas. The patient is nervous/anxious. The patient is not hyperactive.       Constitutional:  Vitals:  Most recent vital signs were reviewed.   Last 3 sets of Vitals        8/26/2023     9:53 AM 11/25/2023     3:32 AM 11/30/2023     8:41 AM   Vitals - 1 value per visit   SYSTOLIC 117 104 108   DIASTOLIC 87 63 60   Pulse 84 77    Temp 98.2 °F (36.8 °C) 97.9 °F (36.6 °C)    Resp 16 19    SPO2 98 % 96 %    Weight (lb) 194.45 183.86 183.42   Weight (kg) 88.2 83.4 83.2   Height 5' 7" (1.702 m) 5' 7" (1.702 m) 5' 7" (1.702 m)   BMI (Calculated) 30.4 28.8 28.7   Pain Score   Zero          Psychiatric:  Oriented: x 3   Attitude: cooperative   Eye Contact: good   Behavior: wnl   Mood: "all over the place"  Affect: appropriate range   Attention: intact   Concentration: grossly intact   Thought Process: goal directed   Speech: intelligible  Volume: WNL   Quantity: WNL   Rhythm: WNL  Insight: fair to good   Threats: no SI / HI   Memory: Grossly intact  Psychosis: denies all   Estimate of Intellectual Function: average   Judgment: fair  Relevant Elements of Neurological Exam: normal gait "     Allergy Review:   Review of patient's allergies indicates:   Allergen Reactions    House dust mite Tinitus      Medical Problem List:   Patient Active Problem List   Diagnosis    Chronic neck pain    Family history of breast cancer in female    Seasonal allergies    Contraceptive management    Stress    PAC (premature atrial contraction)    Chronic left-sided thoracic back pain      Encounter Diagnoses   Name Primary?    Bipolar affective disorder, current episode mixed, without psychotic features Yes    Attention deficit hyperactivity disorder (ADHD), combined type     Insomnia due to mental disorder     Stress     Generalized anxiety disorder       PLAN:  Medication Management: Continue current medications. Discussed risks, benefits, and alternatives to treatment plan documented above with patient. I answered all patient questions related to this plan, and patient expressed understanding and agreement.      Follow up in about 4 weeks (around 5/23/2024) for Medication follow up.     Medication List with Changes/Refills   New Medications    ARIPIPRAZOLE (ABILIFY) 2 MG TAB    Take 1 tablet (2 mg total) by mouth once daily.    FLUOXETINE 10 MG CAPSULE    Take 1 capsule (10 mg total) by mouth once daily.   Current Medications    FLUTICASONE PROPIONATE (FLONASE) 50 MCG/ACTUATION NASAL SPRAY    2 sprays (100 mcg total) by Each Nostril route once daily.    PREDNISONE (DELTASONE) 20 MG TABLET    Take 60 mg (3 tablets) x 4 days, then 40 mg (2 tablets) x 4 days, then 20 mg (1 tablet) x 4 days.    PROAIR HFA 90 MCG/ACTUATION INHALER    INHALE 2 PUFFS INTO THE LUNGS Q 4 HOURS PRN FOR SOB OR WHEEZING      Time spent with pt including note preparation: 40 minutes     Freda Tripathi, PhD, MP  Medical Psychologist

## 2024-05-13 ENCOUNTER — TELEPHONE (OUTPATIENT)
Dept: PSYCHIATRY | Facility: CLINIC | Age: 44
End: 2024-05-13
Payer: OTHER GOVERNMENT

## 2024-05-15 ENCOUNTER — OFFICE VISIT (OUTPATIENT)
Dept: PSYCHIATRY | Facility: CLINIC | Age: 44
End: 2024-05-15
Payer: OTHER GOVERNMENT

## 2024-05-15 DIAGNOSIS — R69 DIAGNOSIS DEFERRED: Primary | ICD-10-CM

## 2024-05-15 PROCEDURE — 99211 OFF/OP EST MAY X REQ PHY/QHP: CPT | Mod: PBBFAC | Performed by: SOCIAL WORKER

## 2024-05-15 PROCEDURE — 99499 UNLISTED E&M SERVICE: CPT | Mod: S$PBB,,, | Performed by: SOCIAL WORKER

## 2024-05-15 PROCEDURE — 99999 PR PBB SHADOW E&M-EST. PATIENT-LVL I: CPT | Mod: PBBFAC,,, | Performed by: SOCIAL WORKER
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
## 2024-05-28 ENCOUNTER — OFFICE VISIT (OUTPATIENT)
Dept: FAMILY MEDICINE | Facility: CLINIC | Age: 44
End: 2024-05-28
Payer: OTHER GOVERNMENT

## 2024-05-28 ENCOUNTER — TELEPHONE (OUTPATIENT)
Dept: FAMILY MEDICINE | Facility: CLINIC | Age: 44
End: 2024-05-28
Payer: OTHER GOVERNMENT

## 2024-05-28 VITALS
WEIGHT: 193.81 LBS | BODY MASS INDEX: 30.42 KG/M2 | HEART RATE: 100 BPM | DIASTOLIC BLOOD PRESSURE: 82 MMHG | OXYGEN SATURATION: 95 % | TEMPERATURE: 98 F | SYSTOLIC BLOOD PRESSURE: 120 MMHG | HEIGHT: 67 IN

## 2024-05-28 DIAGNOSIS — M54.2 CHRONIC NECK PAIN: ICD-10-CM

## 2024-05-28 DIAGNOSIS — G89.29 CHRONIC LEFT-SIDED THORACIC BACK PAIN: Primary | ICD-10-CM

## 2024-05-28 DIAGNOSIS — M54.50 CHRONIC BILATERAL LOW BACK PAIN WITHOUT SCIATICA: ICD-10-CM

## 2024-05-28 DIAGNOSIS — G89.29 CHRONIC NECK PAIN: ICD-10-CM

## 2024-05-28 DIAGNOSIS — Z87.42 HX OF ABNORMAL CERVICAL PAP SMEAR: ICD-10-CM

## 2024-05-28 DIAGNOSIS — G89.29 CHRONIC BILATERAL LOW BACK PAIN WITHOUT SCIATICA: ICD-10-CM

## 2024-05-28 DIAGNOSIS — M54.6 CHRONIC LEFT-SIDED THORACIC BACK PAIN: Primary | ICD-10-CM

## 2024-05-28 PROBLEM — Z30.9 CONTRACEPTIVE MANAGEMENT: Status: RESOLVED | Noted: 2020-09-22 | Resolved: 2024-05-28

## 2024-05-28 PROBLEM — F43.9 STRESS: Status: RESOLVED | Noted: 2021-11-10 | Resolved: 2024-05-28

## 2024-05-28 PROCEDURE — 99999 PR PBB SHADOW E&M-EST. PATIENT-LVL IV: CPT | Mod: PBBFAC,,, | Performed by: FAMILY MEDICINE

## 2024-05-28 PROCEDURE — 99214 OFFICE O/P EST MOD 30 MIN: CPT | Mod: S$PBB,,, | Performed by: FAMILY MEDICINE

## 2024-05-28 PROCEDURE — 99214 OFFICE O/P EST MOD 30 MIN: CPT | Mod: PBBFAC,PO | Performed by: FAMILY MEDICINE

## 2024-05-28 RX ORDER — IBUPROFEN 800 MG/1
800 TABLET ORAL 3 TIMES DAILY
Qty: 60 TABLET | Refills: 1 | Status: SHIPPED | OUTPATIENT
Start: 2024-05-28

## 2024-05-28 NOTE — PROGRESS NOTES
CHIEF COMPLAINT:  This is a 43-year-old female complaining of back pain.    SUBJECTIVE:  The patient is new to me.  She complains of chronic pain in back for 1-2 years for which she has been evaluated by PCP and urgent care.  She has appointment to see pain management specialist in July.  Patient complains of chronic pain in left upper back/shoulder blade region which radiates into left neck and shoulder.  More recently she has been experiencing pain in  bilateral lower back which she describes as sharp in quality.  Patient rates her pain 10/10 on the pain scale.  Patient is employed in the  with [employer] as a .  She intends to retire from the  in April 2025.  She has been signed up for leadership changing course that requires physical training such as running, pushups in the lifting which she is unable to do.  She requests excuse to avoid physical training until evaluated by pain management.  The patient denies radiation of pain into arms or legs, numbness, tingling or weakness in limb.  X-rays of C-spine and thoracic spine showed degenerative changes.    The patient has a history of abnormal Pap smears and ovarian cysts.  She is followed by OBGYN.  Last Pap showed ASCUS with negative HPV in November 2021.    ROS:  GENERAL: Patient denies fever, chills, night sweats.  Patient denies weight gain or loss. Patient denies anorexia, fatigue, weakness or swollen glands.  SKIN: Patient denies rash.  HEENT: Patient denies sore throat, ear pain, hearing loss, nasal congestion, or runny nose. Patient denies visual disturbance, eye irritation or discharge.  LUNGS: Patient denies cough, wheeze or hemoptysis.  CARDIOVASCULAR: Patient denies chest pain, shortness of breath, palpitations, syncope or lower extremity edema.  GI: Patient denies abdominal pain, nausea, vomiting, diarrhea, constipation, blood in stool or melena.  GENITOURINARY: Patient denies pelvic pain, vaginal discharge, itch or  odor. Patient denies irregular vaginal bleeding.  Patient denies dysuria, frequency, hematuria, nocturia, urgency or incontinence.  BREASTS: Patient denies breast pain, mass or nipple discharge.  MUSCULOSKELETAL: Patient denies joint pain, swelling, redness or warmth.  Positive for back and neck pain.  NEUROLOGIC: Patient denies headache, vertigo, paresthesias, weakness in limb, dysarthria, dysphagia or abnormality of gait.  PSYCHIATRIC: Patient denies depression, anxiety or memory loss.    OBJECTIVE:   GENERAL: Well-developed well-nourished overweight female alert and oriented x3 in no acute distress.  Memory, judgment and cognition without deficit.  SKIN: Clear without rash.  Normal color and tone.  HEENT: Eyes: Clear conjunctivae.  No scleral icterus.  NECK: Supple, normal range of motion.  No lymphadenopathy.  No JVD.  LUNGS:  Normal respiratory effort.  BACK: No CVA or spinal tenderness.  No paraspinous muscle tenderness or spasm.  Normal range of motion in flexion and extension.  EXTREMITIES: Without cyanosis, clubbing or edema.  Distal pulses 2+ and equal.  Normal range of motion in all extremities.  No joint effusion, erythema or warmth.  NEUROLOGIC:  Motor strength equal bilaterally.  Sensation normal to touch.  Deep tendon reflexes 2+ and equal.  Gait without abnormality.  No tremor.      ASSESSMENT:  1. Chronic left-sided thoracic back pain    2. Chronic neck pain    3. Chronic bilateral low back pain without sciatica    4. Hx of abnormal cervical Pap smear      PLAN:  1. Apply moist heat and/or ice q.i.d. for 15-20 minutes.    2. Ibuprofen 800 mg 3 times daily with food.    3. Physical therapy.    4. Keep appointment with pain management.    5. Letter composed it is using patient from physical training due to chronic back issues.    6. Follow-up no improvement or worsening symptoms.    30 minutes of total time spent on the encounter, which includes face to face time and non-face to face time preparing to  see the patient (eg, review of tests), Obtaining and/or reviewing separately obtained history, Documenting clinical information in the electronic or other health record, Independently interpreting results (not separately reported) and communicating results to the patient/family/caregiver, or Care coordination (not separately reported).     This note is generated with speech recognition software and is subject to transcription error and sound alike phrases that may be missed by proofreading.

## 2024-05-28 NOTE — LETTER
May 28, 2024      [facility]  [address] [place] [address]  Phone: [phone]  Fax: [phone]       Patient: Brionna Carrion   YOB: 1980  Date of Visit: 05/28/2024    To Whom It May Concern:    Cindy Carrion  was at [facility] on 05/28/2024. Brionna Carrion is under my care. Due to chronic back condition, she cannot lift over 10 lbs, run, or do push ups until evaluated by back specialist on July 30, 2024.     If you have any further questions, please feel free to contact me.       Sincerely,      Liza Virk MD

## 2024-05-28 NOTE — LETTER
May 28, 2024      [facility]  [address] [place] [address]  Phone: [phone]  Fax: [phone]       Patient: Brionna Carrion   YOB: 1980  Date of Visit: 05/28/2024    To Whom It May Concern:    Cindy Carrion  was at [facility] on 05/28/2024. Brionna Carrion is under my care. Due to chronic back condition, she cannot lift over 10 lbs, run, or do push ups until evaluated by back specialist on July 30, 2024.     If you have any further questions, please feel free to contact me.       Sincerely,        Franklyn Hobson LPN

## 2024-06-17 ENCOUNTER — OFFICE VISIT (OUTPATIENT)
Dept: URGENT CARE | Facility: CLINIC | Age: 44
End: 2024-06-17
Payer: OTHER GOVERNMENT

## 2024-06-17 VITALS
DIASTOLIC BLOOD PRESSURE: 80 MMHG | OXYGEN SATURATION: 97 % | BODY MASS INDEX: 31.08 KG/M2 | WEIGHT: 198 LBS | HEIGHT: 67 IN | SYSTOLIC BLOOD PRESSURE: 121 MMHG | TEMPERATURE: 98 F | RESPIRATION RATE: 16 BRPM | HEART RATE: 67 BPM

## 2024-06-17 DIAGNOSIS — H65.93 MIDDLE EAR EFFUSION, BILATERAL: ICD-10-CM

## 2024-06-17 DIAGNOSIS — H65.03 NON-RECURRENT ACUTE SEROUS OTITIS MEDIA OF BOTH EARS: Primary | ICD-10-CM

## 2024-06-17 DIAGNOSIS — R09.81 NASAL CONGESTION: ICD-10-CM

## 2024-06-17 PROCEDURE — 99213 OFFICE O/P EST LOW 20 MIN: CPT | Mod: S$PBB,,,

## 2024-06-17 PROCEDURE — 99214 OFFICE O/P EST MOD 30 MIN: CPT | Mod: PBBFAC

## 2024-06-17 RX ORDER — PREDNISONE 20 MG/1
20 TABLET ORAL 2 TIMES DAILY
Qty: 10 TABLET | Refills: 0 | Status: SHIPPED | OUTPATIENT
Start: 2024-06-17 | End: 2024-06-22

## 2024-06-17 RX ORDER — FLUTICASONE PROPIONATE 50 MCG
2 SPRAY, SUSPENSION (ML) NASAL DAILY
Qty: 16 G | Refills: 2 | Status: SHIPPED | OUTPATIENT
Start: 2024-06-17

## 2024-06-18 NOTE — PROGRESS NOTES
"Subjective:       Patient ID: Brionna Carrion is a 43 y.o. female.    Vitals:  height is 5' 7" (1.702 m) and weight is 89.8 kg (198 lb). Her oral temperature is 97.8 °F (36.6 °C). Her blood pressure is 121/80 and her pulse is 67. Her respiration is 16 and oxygen saturation is 97%.     Chief Complaint: URI (Cough, nasal congestion and bilateral ear pain, left ear worse x 1 week.)    44 y/o white female presents to clinic alone, reports symptoms x 2 days, admits to recent cold symptoms onset last week which have mostly resolved. Denies any ear trauma or injury, states left ear is muffled. Takes Zyrtec Daily.     URI   Associated symptoms include congestion and ear pain. Pertinent negatives include no coughing, sinus pain or sore throat.       Constitution: Negative for fever.   HENT:  Positive for ear pain, hearing loss and congestion. Negative for ear discharge, foreign body in ear, tinnitus, facial swelling, facial trauma, sinus pain and sore throat.    Respiratory:  Negative for cough.    Allergic/Immunologic: Positive for seasonal allergies.   Neurological:  Positive for dizziness.       Objective:      Physical Exam   Constitutional: She is oriented to person, place, and time. She is cooperative. She is easily aroused. She does not appear ill. awake  HENT:   Head: Normocephalic and atraumatic.   Ears:   Right Ear: Hearing, external ear and ear canal normal. A middle ear effusion is present.   Left Ear: External ear and ear canal normal. A middle ear effusion is present.   Nose: Mucosal edema present.   Mouth/Throat: Uvula is midline, oropharynx is clear and moist and mucous membranes are normal. Tonsils are 0 on the right. Tonsils are 0 on the left.   Eyes: Lids are normal.   Neck: Neck supple.   Pulmonary/Chest: Effort normal.   Abdominal: Normal appearance.   Neurological: She is alert, oriented to person, place, and time and easily aroused. Gait normal. GCS eye subscore is 4. GCS verbal subscore is 5. GCS motor " subscore is 6.   Skin: Skin is warm, dry and intact. Capillary refill takes less than 2 seconds.   Psychiatric: Her behavior is normal.   Nursing note and vitals reviewed.        Assessment:       1. Non-recurrent acute serous otitis media of both ears    2. Middle ear effusion, bilateral    3. Nasal congestion          Plan:     No signs of infection, encouraged patient to avoid allergies triggers or irritants, may benefit from antihistamine + decongestant( no > 7 days) Ensure she remains hydrated, return to clinic or follow up with PCP if symptoms does not improve with recommendations.     Non-recurrent acute serous otitis media of both ears    Middle ear effusion, bilateral  -     predniSONE (DELTASONE) 20 MG tablet; Take 1 tablet (20 mg total) by mouth 2 (two) times daily. for 5 days  Dispense: 10 tablet; Refill: 0    Nasal congestion  -     predniSONE (DELTASONE) 20 MG tablet; Take 1 tablet (20 mg total) by mouth 2 (two) times daily. for 5 days  Dispense: 10 tablet; Refill: 0  -     fluticasone propionate (FLONASE) 50 mcg/actuation nasal spray; 2 sprays (100 mcg total) by Each Nostril route once daily.  Dispense: 16 g; Refill: 2

## 2024-07-13 PROBLEM — Z76.89 ENCOUNTER TO ESTABLISH CARE WITH NEW DOCTOR: Status: ACTIVE | Noted: 2020-09-22

## 2024-07-29 ENCOUNTER — TELEPHONE (OUTPATIENT)
Dept: PSYCHIATRY | Facility: CLINIC | Age: 44
End: 2024-07-29
Payer: OTHER GOVERNMENT

## 2024-07-29 ENCOUNTER — TELEPHONE (OUTPATIENT)
Dept: PAIN MEDICINE | Facility: CLINIC | Age: 44
End: 2024-07-29
Payer: OTHER GOVERNMENT

## 2024-07-29 NOTE — PROGRESS NOTES
New Patient Chronic Pain Note (Initial Visit)    Referring Physician: Emmanuel Bernal    PCP: Liza Virk MD    Chief Complaint: No chief complaint on file.       SUBJECTIVE:    Brionna Carrion is a 43 y.o. female with past medical history significant for seasonal allergies who presents to the clinic for the evaluation of bilateral neck and periscapular pain.  Patient reports pain began approximately 4 months prior.  Of note patient has been enrolled in the [employer] for several years with rigorous physical therapy routine which may have precipitated these symptoms.  Today she reports pain which is constant which is rated a 7/10.  Patient reports pain in bilateral cervical paraspinous musculature which can radiate to the left periscapular territory in C5-8 dermatomal distribution.  She denies more distal radiculopathy to the upper extremities or hands but does report pain in bilateral elbows and wrists.  Patient reports her significant pain has affected activities of daily living as well as her career.  She reports she is no longer able to ride a bicycle, run or road.  Pain is marginally improved with taking a hot bath or stretching.  Patient has trialed ibuprofen without significant improvement.  She has performed physician directed physical therapy exercises over the last 8 weeks from 05/03/2024 through 07/03/2024 for neck and radiating pain with ineffective relief.  She has not trialed membrane stabilizing agents or antispasmodics or interventional treatment.  She does report generalized weakness in the upper extremities and hands but denies myelopathic signs such as compromise in hand  strength or dexterity.    Patient reports significant motor weakness.  Patient denies night fever/night sweats, urinary incontinence, bowel incontinence, significant weight loss, and loss of sensations.      Pain Disability Index Review:         7/30/2024     1:58 PM   Last 3 PDI Scores   Pain Disability Index (PDI) 34        Non-Pharmacologic Treatments:  Physical Therapy/Home Exercise: yes  Ice/Heat:yes  TENS: no  Acupuncture: no  Massage: yes  Chiropractic: no    Other: no      Pain Medications:  - Adjuvant Medications: Advil,Motrin ( Ibuprofen)    Pain Procedures:   None    Past Medical History:   Diagnosis Date    Abnormal Pap smear     HPV/ biopsy-- never had a normal pap    Closed displaced fracture of proximal phalanx of lesser toe of left foot 08/07/2017    History of HPV infection 01/01/2002    Initially had abnormal PAPs, except for 2015.    Left ovarian cyst     2 cm per CT abn & Pelvis w/o at [facility] on 12/7/19     Past Surgical History:   Procedure Laterality Date    APPENDECTOMY      BREAST RECONSTRUCTION      augmentation    TONSILLECTOMY       Review of patient's allergies indicates:   Allergen Reactions    House dust mite Tinitus       Current Outpatient Medications   Medication Sig    fluticasone propionate (FLONASE) 50 mcg/actuation nasal spray 2 sprays (100 mcg total) by Each Nostril route once daily.    ibuprofen (ADVIL,MOTRIN) 800 MG tablet Take 1 tablet (800 mg total) by mouth 3 (three) times daily.    PROAIR HFA 90 mcg/actuation inhaler INHALE 2 PUFFS INTO THE LUNGS Q 4 HOURS PRN FOR SOB OR WHEEZING    ARIPiprazole (ABILIFY) 2 MG Tab Take 1 tablet (2 mg total) by mouth once daily.    FLUoxetine 10 MG capsule Take 1 capsule (10 mg total) by mouth once daily.    methocarbamoL (ROBAXIN) 500 MG Tab Take 1 tablet (500 mg total) by mouth 2 (two) times daily as needed (muscle spasms).    nabumetone (RELAFEN) 500 MG tablet Take 1 tablet (500 mg total) by mouth 2 (two) times daily as needed for Pain. Take with food.     No current facility-administered medications for this visit.         ROS:  GENERAL:  No weight loss, malaise or fevers.  HEENT:   No recent changes in vision or hearing  NECK:  Negative for lumps, no difficulty with swallowing.  RESPIRATORY:  Negative for cough, wheezing or shortness of breath, patient  "denies any recent URI.  CARDIOVASCULAR:  Negative for chest pain or palpitations.  GI:  Negative for abdominal discomfort, blood in stools or black stools or change in bowel habits.  MUSCULOSKELETAL:  See HPI.  SKIN:  Negative for lesions, rash, and itching.  PSYCH:  No mood disorder or recent psychosocial stressors.   HEMATOLOGY/LYMPHOLOGY:  Negative for prolonged bleeding, bruising easily or swollen nodes.    NEURO:   No history of syncope, paralysis, seizures or tremors.  All other reviewed and negative other than HPI.    OBJECTIVE:    /73   Pulse 70   Resp 17   Ht 5' 7" (1.702 m)   Wt 89 kg (196 lb 3.4 oz)   BMI 30.73 kg/m²       Physical Exam:    GENERAL: Well appearing, in no acute distress, alert and oriented x3.  PSYCH:  Mood and affect appropriate.  SKIN: Skin color, texture, turgor normal, no rashes or lesions.  HEAD/FACE:  Normocephalic, atraumatic. Cranial nerves grossly intact.    NECK:  pain to palpation over the cervical paraspinous muscles. Spurling Negative.  pain with neck flexion, extension, or lateral flexion.  Mildly reduced lateral rotation  Normaltesting biceps, triceps and brachioradialis bilaterally.    NegativeHoffmann's bilaterally.    5/5 strength testing deltoid, biceps, triceps, wrist extensor, wrist flexor and ulnar intrinsics bilaterally.    Normal  strength bilaterally    CV: RRR with palpation of the radial artery.  PULM: No evidence of respiratory difficulty, symmetric chest rise.  GI:  Soft and non-tender.    NEURO: Bilateral upper and lower extremity coordination and muscle stretch reflexes are physiologic and symmetric. No loss of sensation is noted.  GAIT: normal.    Imagin22    X-Ray Cervical Spine AP And Lateral    Narrative  EXAMINATION:  XR CERVICAL SPINE AP LATERAL    CLINICAL HISTORY:  Cervicalgia    TECHNIQUE:  AP, lateral and open mouth views of the cervical spine were performed.    COMPARISON:  2016    FINDINGS:  Vertebral body heights " are well maintained.  No evidence of acute fracture or subluxation.  Paravertebral soft tissues demonstrate no acute abnormality.  Mild degenerative changes present.        ASSESSMENT: 43 y.o. year old female with     1. DDD (degenerative disc disease), cervical  Ambulatory referral/consult to Physical/Occupational Therapy      2. Cervical spondylosis  Case Request-RAD/Other Procedure Area: Bilateral C4-6 MBB with RN IV sedation; schedule for 30 min    Ambulatory referral/consult to Physical/Occupational Therapy      3. Cervical myofascial pain syndrome  Ambulatory referral/consult to Physical/Occupational Therapy      4. Radiculopathy, cervical region  MRI Cervical Spine Without Contrast          PLAN:   - Interventions:  Schedule for bilateral C4-6 cervical medial branch block to see if this helps with axial neck pain.  We have discussed with significant (80%) relief x2, she may be a candidate for high heat radiofrequency ablation for more sustained relief.. Explained the risks and benefits of the procedure in detail with the patient today in clinic along with alternative treatment options, and the patient elected to pursue the intervention at this time.      - Anticoagulation use: No no anticoagulation     report:  Reviewed and consistent with medication use as prescribed.    - Medications:  -I will start the patient on Nabumetone 500 mg BID to see if this helps with his pain. We have discussed potential deleterious side effects of NSAIDs on the cardiovascular, gastrointestinal and renal systems. We have discussed judicious use of this medication. Pt expresses understanding.     -We have discussed temporarily starting an anti spasmodic, Robaxin 500 mg twice daily  to see if this helps with myofascial pain.  We have discussed potential deleterious side effects associated with this medication including  dizziness, drowsiness, stomach upset, nausea vomiting or blurred vision.  Patient expresses  understanding.    -A TENS unit was provided to the patient and they were instructed on its use by the [employer] ([employer]) representatives. The patient was counseled that this may help treat their myofascial pain through transcutaneous electrical nerve stimulation and excitation via an electric current.  We have discussed that the unit is usually connected to the skin using two or more electrodes, which are typically conductive gel pads.  A typical battery-operated TENS unit is able to modulate pulse width, frequency and intensity to help reduce pain.      - Therapy:   We discussed initiating physical therapy to help manage the patient/s painful condition. The patient was counseled that muscle strengthening will improve the long term prognosis in regards to pain and may also help increase range of motion and mobility. They were told that one of the goals of physical therapy is that they learn how to do the exercises so that they can do them independently at home daily upon completion. The patient's questions were answered and they were agreeable to this course. [facility] the [facility]    - Imaging: Reviewed available imaging (x-ray cervical spine) with patient and answered any questions they had regarding study.  MRI cervical spine to better evaluate pain      - Follow up visit: return to clinic in 4-6 weeks      The above plan and management options were discussed at length with patient. Patient is in agreement with the above and verbalized understanding.    - I discussed the goals of interventional chronic pain management with the patient on today's visit. We discussed a multimodal and systematic approach to pain.  This includes diagnostic and therapeutic injections, adjuvant pharmacologic treatment, physical therapy, and at times psychiatry.  I emphasized the importance of regular exercise, core strengthening and stretching, diet and weight  loss as a cornerstone of long-term pain management.    - This condition does not require this patient to take time off of work, and the primary goal of our Pain Management services is to improve the patient's functional capacity.  - Patient Questions: Answered all of the patient's questions regarding diagnoses, therapy, treatment and next steps        Toby Lomeli MD  Interventional Pain Management  [facility]    Disclaimer:  This note was prepared using voice recognition system and is likely to have sound alike errors that may have been overlooked even after proof reading.  Please call me with any questions

## 2024-07-30 ENCOUNTER — OFFICE VISIT (OUTPATIENT)
Dept: PAIN MEDICINE | Facility: CLINIC | Age: 44
End: 2024-07-30
Payer: OTHER GOVERNMENT

## 2024-07-30 VITALS
RESPIRATION RATE: 17 BRPM | HEIGHT: 67 IN | WEIGHT: 196.19 LBS | HEART RATE: 70 BPM | DIASTOLIC BLOOD PRESSURE: 73 MMHG | BODY MASS INDEX: 30.79 KG/M2 | SYSTOLIC BLOOD PRESSURE: 118 MMHG

## 2024-07-30 DIAGNOSIS — M54.12 RADICULOPATHY, CERVICAL REGION: ICD-10-CM

## 2024-07-30 DIAGNOSIS — M47.812 CERVICAL SPONDYLOSIS: ICD-10-CM

## 2024-07-30 DIAGNOSIS — M79.18 CERVICAL MYOFASCIAL PAIN SYNDROME: ICD-10-CM

## 2024-07-30 DIAGNOSIS — M50.30 DDD (DEGENERATIVE DISC DISEASE), CERVICAL: Primary | ICD-10-CM

## 2024-07-30 PROCEDURE — 99204 OFFICE O/P NEW MOD 45 MIN: CPT | Mod: S$PBB,,, | Performed by: ANESTHESIOLOGY

## 2024-07-30 PROCEDURE — 99999 PR PBB SHADOW E&M-EST. PATIENT-LVL IV: CPT | Mod: PBBFAC,,, | Performed by: ANESTHESIOLOGY

## 2024-07-30 PROCEDURE — 99214 OFFICE O/P EST MOD 30 MIN: CPT | Mod: PBBFAC | Performed by: ANESTHESIOLOGY

## 2024-07-30 RX ORDER — NABUMETONE 500 MG/1
500 TABLET, FILM COATED ORAL 2 TIMES DAILY PRN
Qty: 60 TABLET | Refills: 0 | Status: SHIPPED | OUTPATIENT
Start: 2024-07-30 | End: 2024-08-29

## 2024-07-30 RX ORDER — METHOCARBAMOL 500 MG/1
500 TABLET, FILM COATED ORAL 2 TIMES DAILY PRN
Qty: 60 TABLET | Refills: 0 | Status: SHIPPED | OUTPATIENT
Start: 2024-07-30

## 2024-07-31 ENCOUNTER — PATIENT MESSAGE (OUTPATIENT)
Dept: PSYCHIATRY | Facility: CLINIC | Age: 44
End: 2024-07-31

## 2024-07-31 ENCOUNTER — OFFICE VISIT (OUTPATIENT)
Dept: PSYCHIATRY | Facility: CLINIC | Age: 44
End: 2024-07-31
Payer: OTHER GOVERNMENT

## 2024-07-31 DIAGNOSIS — F90.2 ATTENTION DEFICIT HYPERACTIVITY DISORDER (ADHD), COMBINED TYPE: ICD-10-CM

## 2024-07-31 DIAGNOSIS — F41.9 ANXIETY AND DEPRESSION: Primary | ICD-10-CM

## 2024-07-31 DIAGNOSIS — F43.9 STRESS: ICD-10-CM

## 2024-07-31 DIAGNOSIS — T74.92XA CHILDHOOD ABUSE: ICD-10-CM

## 2024-07-31 DIAGNOSIS — Z86.59 HISTORY OF EATING DISORDER: ICD-10-CM

## 2024-07-31 DIAGNOSIS — F32.A ANXIETY AND DEPRESSION: Primary | ICD-10-CM

## 2024-07-31 PROCEDURE — 90834 PSYTX W PT 45 MINUTES: CPT | Mod: 95,,, | Performed by: SOCIAL WORKER
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
